# Patient Record
Sex: MALE | Race: WHITE | NOT HISPANIC OR LATINO | Employment: FULL TIME | URBAN - METROPOLITAN AREA
[De-identification: names, ages, dates, MRNs, and addresses within clinical notes are randomized per-mention and may not be internally consistent; named-entity substitution may affect disease eponyms.]

---

## 2023-01-23 PROBLEM — E03.8 SUBCLINICAL HYPOTHYROIDISM: Status: ACTIVE | Noted: 2023-01-23

## 2023-01-23 PROBLEM — Z86.0100 HISTORY OF COLON POLYPS: Status: ACTIVE | Noted: 2023-01-23

## 2023-01-23 PROBLEM — R10.9 ABDOMINAL PAIN: Status: ACTIVE | Noted: 2023-01-23

## 2023-01-23 PROBLEM — K80.20 CHOLELITHIASIS: Status: ACTIVE | Noted: 2023-01-23

## 2023-01-23 PROBLEM — I10 HYPERTENSION: Status: ACTIVE | Noted: 2023-01-23

## 2023-01-23 PROBLEM — R73.03 PREDIABETES: Status: ACTIVE | Noted: 2023-01-23

## 2023-01-23 PROBLEM — G47.9 SLEEP DIFFICULTIES: Status: ACTIVE | Noted: 2023-01-23

## 2023-01-23 PROBLEM — Z12.5 PROSTATE CANCER SCREENING: Status: ACTIVE | Noted: 2023-01-23

## 2023-01-23 PROBLEM — Z86.010 HISTORY OF COLON POLYPS: Status: ACTIVE | Noted: 2023-01-23

## 2023-01-23 PROBLEM — R05.9 COUGH: Status: ACTIVE | Noted: 2023-01-23

## 2023-01-23 PROBLEM — N28.9 RENAL INSUFFICIENCY: Status: ACTIVE | Noted: 2023-01-23

## 2023-01-23 PROBLEM — E27.1 ADDISON'S DISEASE (MULTI): Status: ACTIVE | Noted: 2023-01-23

## 2023-01-23 PROBLEM — G89.18 POST-OP PAIN: Status: ACTIVE | Noted: 2023-01-23

## 2023-01-23 PROBLEM — E78.5 HYPERLIPIDEMIA: Status: ACTIVE | Noted: 2023-01-23

## 2023-01-23 PROBLEM — Z86.018 HISTORY OF CHANGING SKIN MOLE: Status: ACTIVE | Noted: 2023-01-23

## 2023-01-23 PROBLEM — R74.8 ELEVATED LIVER ENZYMES: Status: ACTIVE | Noted: 2023-01-23

## 2023-01-23 PROBLEM — Z01.818 PREOP EXAMINATION: Status: ACTIVE | Noted: 2023-01-23

## 2023-01-23 PROBLEM — R19.4 BOWEL HABIT CHANGES: Status: ACTIVE | Noted: 2023-01-23

## 2023-01-23 PROBLEM — Z13.6 SCREENING FOR ABDOMINAL AORTIC ANEURYSM (AAA) PERFORMED: Status: ACTIVE | Noted: 2023-01-23

## 2023-01-23 RX ORDER — PREDNISONE 5 MG/1
5 TABLET ORAL DAILY
COMMUNITY
End: 2024-02-09 | Stop reason: SDUPTHER

## 2023-01-23 RX ORDER — PANTOPRAZOLE SODIUM 40 MG/1
40 TABLET, DELAYED RELEASE ORAL
COMMUNITY
End: 2023-04-04 | Stop reason: ALTCHOICE

## 2023-01-23 RX ORDER — SUCRALFATE 1 G/1
1 TABLET ORAL
COMMUNITY
End: 2023-04-04 | Stop reason: ALTCHOICE

## 2023-01-23 RX ORDER — LEVOTHYROXINE SODIUM 50 UG/1
50 TABLET ORAL
COMMUNITY
End: 2023-04-04 | Stop reason: SDUPTHER

## 2023-01-23 RX ORDER — ATORVASTATIN CALCIUM 40 MG/1
40 TABLET, FILM COATED ORAL DAILY
COMMUNITY
End: 2023-08-29

## 2023-01-23 RX ORDER — PREDNISONE 2.5 MG/1
2.5 TABLET ORAL DAILY
COMMUNITY
End: 2023-04-04 | Stop reason: ALTCHOICE

## 2023-01-23 RX ORDER — FAMOTIDINE 20 MG/1
20 TABLET, FILM COATED ORAL NIGHTLY
COMMUNITY
End: 2023-04-04 | Stop reason: ALTCHOICE

## 2023-04-04 ENCOUNTER — OFFICE VISIT (OUTPATIENT)
Dept: PRIMARY CARE | Facility: CLINIC | Age: 68
End: 2023-04-04
Payer: MEDICARE

## 2023-04-04 VITALS
SYSTOLIC BLOOD PRESSURE: 125 MMHG | HEIGHT: 67 IN | WEIGHT: 218 LBS | BODY MASS INDEX: 34.21 KG/M2 | HEART RATE: 67 BPM | DIASTOLIC BLOOD PRESSURE: 69 MMHG | TEMPERATURE: 96 F | RESPIRATION RATE: 18 BRPM | OXYGEN SATURATION: 96 %

## 2023-04-04 DIAGNOSIS — E03.9 HYPOTHYROIDISM, UNSPECIFIED TYPE: ICD-10-CM

## 2023-04-04 DIAGNOSIS — R73.03 PREDIABETES: ICD-10-CM

## 2023-04-04 DIAGNOSIS — E27.1 ADDISON'S DISEASE (MULTI): ICD-10-CM

## 2023-04-04 DIAGNOSIS — Z00.00 HEALTHCARE MAINTENANCE: Primary | ICD-10-CM

## 2023-04-04 DIAGNOSIS — E03.8 SUBCLINICAL HYPOTHYROIDISM: ICD-10-CM

## 2023-04-04 DIAGNOSIS — E66.9 OBESITY (BMI 30.0-34.9): ICD-10-CM

## 2023-04-04 DIAGNOSIS — R19.4 BOWEL HABIT CHANGES: ICD-10-CM

## 2023-04-04 DIAGNOSIS — Z86.018 HISTORY OF CHANGING SKIN MOLE: ICD-10-CM

## 2023-04-04 DIAGNOSIS — Z12.5 PROSTATE CANCER SCREENING: ICD-10-CM

## 2023-04-04 DIAGNOSIS — E78.5 HYPERLIPIDEMIA, UNSPECIFIED HYPERLIPIDEMIA TYPE: ICD-10-CM

## 2023-04-04 DIAGNOSIS — Z13.6 SCREENING FOR ABDOMINAL AORTIC ANEURYSM (AAA) PERFORMED: ICD-10-CM

## 2023-04-04 DIAGNOSIS — R74.8 ELEVATED LIVER ENZYMES: ICD-10-CM

## 2023-04-04 PROBLEM — I10 HYPERTENSION: Status: RESOLVED | Noted: 2023-01-23 | Resolved: 2023-04-04

## 2023-04-04 PROBLEM — M65.841 OTHER SYNOVITIS AND TENOSYNOVITIS, RIGHT HAND: Status: ACTIVE | Noted: 2023-04-04

## 2023-04-04 PROBLEM — I10 BENIGN ESSENTIAL HYPERTENSION: Status: RESOLVED | Noted: 2023-04-04 | Resolved: 2023-04-04

## 2023-04-04 PROBLEM — I10 BENIGN ESSENTIAL HYPERTENSION: Status: ACTIVE | Noted: 2023-04-04

## 2023-04-04 PROBLEM — E66.811 OBESITY (BMI 30.0-34.9): Status: ACTIVE | Noted: 2023-04-04

## 2023-04-04 LAB — POC HEMOGLOBIN A1C: 5.7 % (ref 4.2–6.5)

## 2023-04-04 PROCEDURE — G0009 ADMIN PNEUMOCOCCAL VACCINE: HCPCS | Performed by: FAMILY MEDICINE

## 2023-04-04 PROCEDURE — 83036 HEMOGLOBIN GLYCOSYLATED A1C: CPT | Performed by: FAMILY MEDICINE

## 2023-04-04 PROCEDURE — 1160F RVW MEDS BY RX/DR IN RCRD: CPT | Performed by: FAMILY MEDICINE

## 2023-04-04 PROCEDURE — 1159F MED LIST DOCD IN RCRD: CPT | Performed by: FAMILY MEDICINE

## 2023-04-04 PROCEDURE — 90472 IMMUNIZATION ADMIN EACH ADD: CPT | Performed by: FAMILY MEDICINE

## 2023-04-04 PROCEDURE — 1036F TOBACCO NON-USER: CPT | Performed by: FAMILY MEDICINE

## 2023-04-04 PROCEDURE — 90677 PCV20 VACCINE IM: CPT | Performed by: FAMILY MEDICINE

## 2023-04-04 PROCEDURE — 3008F BODY MASS INDEX DOCD: CPT | Performed by: FAMILY MEDICINE

## 2023-04-04 PROCEDURE — 90715 TDAP VACCINE 7 YRS/> IM: CPT | Performed by: FAMILY MEDICINE

## 2023-04-04 PROCEDURE — 99214 OFFICE O/P EST MOD 30 MIN: CPT | Performed by: FAMILY MEDICINE

## 2023-04-04 RX ORDER — LEVOTHYROXINE SODIUM 50 UG/1
50 TABLET ORAL
Qty: 90 TABLET | Refills: 3 | Status: SHIPPED | OUTPATIENT
Start: 2023-04-04 | End: 2024-04-09

## 2023-04-04 RX ORDER — DOXYCYCLINE 50 MG/1
1 TABLET ORAL DAILY
COMMUNITY
Start: 2023-03-09 | End: 2024-05-09 | Stop reason: ALTCHOICE

## 2023-04-04 ASSESSMENT — ENCOUNTER SYMPTOMS
EYE DISCHARGE: 0
HEADACHES: 0
TREMORS: 0
SPEECH DIFFICULTY: 0
POLYDIPSIA: 0
BACK PAIN: 0
ABDOMINAL PAIN: 0
NERVOUS/ANXIOUS: 0
FATIGUE: 0
ARTHRALGIAS: 1
DYSURIA: 0
AGITATION: 0
MYALGIAS: 0
LIGHT-HEADEDNESS: 0
ANAL BLEEDING: 0
SORE THROAT: 0
NECK PAIN: 0
ADENOPATHY: 0
UNEXPECTED WEIGHT CHANGE: 0
POLYPHAGIA: 0
BRUISES/BLEEDS EASILY: 0
HEMATURIA: 0
TROUBLE SWALLOWING: 0
NECK STIFFNESS: 0
CONSTIPATION: 1
NUMBNESS: 0
DIZZINESS: 0
WHEEZING: 0
FLANK PAIN: 0
DIARRHEA: 0
SEIZURES: 0
CHEST TIGHTNESS: 0
FEVER: 0
VOMITING: 0
RECTAL PAIN: 0
BLOOD IN STOOL: 0
DIFFICULTY URINATING: 0

## 2023-04-04 ASSESSMENT — PATIENT HEALTH QUESTIONNAIRE - PHQ9
SUM OF ALL RESPONSES TO PHQ9 QUESTIONS 1 AND 2: 0
1. LITTLE INTEREST OR PLEASURE IN DOING THINGS: NOT AT ALL
2. FEELING DOWN, DEPRESSED OR HOPELESS: NOT AT ALL

## 2023-04-04 NOTE — PATIENT INSTRUCTIONS
Please consider exercise program involving walking or some other form of aerobic activity 5 days weekly for 30 minutes... Let's also consider strengthening of large muscle groups like the abdominal muscles or shoulder muscles... Twice weekly with reps of 5/10/15 exercises and gradually increase strength.. This is not heavy strength training but light weight training... Sit ups or back exercise routine.. Please ask for handout if uncertain how to do..This  will help to strengthen your muscles which in turn will help you to lose weight.... You might ask what is the best diet available.. I would strongly encourage you to consider  Weight Watchers.. And as  your  fellow on  Weight Watchers physician attempting to  live this  LIFE  style  choice with you....  I will be glad to give you recommendations on what to eat.. Consider buying Macy bread.., lisa bagle thin bread.. oikos yogurt... eggs  to eat as hard-boiled... Halo top ice cream for snack... All these are delicious foods which.. when eaten and  being compliant eating three  meals daily  breakfast lunch and dinner, drinking  64 ounces of water daily we will all win together !!!!!!!. This will be a means for you to lose weight... Consider also the smart phone andrews ... My plate.. Or My  fitness  pal..,  as additional possibilities for weight loss... Good  lucmariola Ybarra!    Discussed medication side effects.  The  risk benefits and treatment options  discussed with patient.  Better or so we added his name at    Please schedule follow-up appointment based upon your improvement/failure to improve/chronic medical conditions and physician recommendations during office appointment at the .  For lesion, open ended    Patient advised to go to er if symptoms worsen or to call answering service, or to return to office for additional evaluation    This note was partially  generated using Dragon voice recognition and there may be incorrect words, wording,  spelling, or pronunciation errors that were not corrected prior to committing the note to the medical record.     Problem List Items Addressed This Visit          Digestive    [place]'s disease (CMS/[facility])     Will continue to monitor electrolytes patient advised to get labs Joash/July in light of recent labs being 12/22.  Encouraged to see endocrinology for close follow-up and recommendations            Endocrine/Metabolic    Prediabetes    Relevant Orders    Prostate Specific Antigen, Screen    POCT glycosylated hemoglobin (Hb A1C) manually resulted    Subclinical hypothyroidism     Last TSH noted andDated 11/16/2021 with result being 1.46 and stable free T41.0 821 patient encouraged to take same dosage interval repeat testing with labs this year.         Obesity (BMI 30.0-34.9)     Please consider exercise program involving walking or some other form of aerobic activity 5 days weekly for 30 minutes... Let's also consider strengthening of large muscle groups like the abdominal muscles or shoulder muscles... Twice weekly with reps of 5/10/15 exercises and gradually increase strength.. This is not heavy strength training but light weight training... Sit ups or back exercise routine.. Please ask for handout if uncertain how to do..This  will help to strengthen your muscles which in turn will help you to lose weight.... You might ask what is the best diet available.. I would strongly encourage you to consider  Weight Watchers.. And as  your  fellow on  Weight Watchers physician attempting to  live this  LIFE  style  choice with you....  I will be glad to give you recommendations on what to eat.. Consider buying Macy bread.., lisa bagle thin bread.. oikos yogurt... eggs  to eat as hard-boiled... Halo top ice cream for snack... All these are delicious foods which.. when eaten and  being compliant eating three  meals daily  breakfast lunch and dinner, drinking  64 ounces of water daily we will all win together  !!!!!!!. This will be a means for you to lose weight... Consider also the smart phone andrews ... My plate.. Or My  fitness  pal..,  as additional possibilities for weight loss... Good  luck  Dr. ARETHA Ybarra!                        Other    Bowel habit changes    Relevant Orders    CBC    Elevated liver enzymes    History of changing skin mole    Hyperlipidemia     Cholesterol numbers reviewed Lipids dated 6/7/2021 showed cholesterol 148 HDL 39 LDL 89 with other labs and encourage diet  repeat ordered           Relevant Orders    Comprehensive Metabolic Panel    Lipid Panel    Prostate cancer screening    Relevant Orders    Prostate Specific Antigen, Screen    Screening for abdominal aortic aneurysm (AAA) performed     Ultrasound ordered and plan above and no aneurysm noted         Healthcare maintenance - Primary     Other Visit Diagnoses       Hypothyroidism, unspecified type        Relevant Medications    levothyroxine (Synthroid, Levoxyl) 50 mcg tablet    Other Relevant Orders    Thyroxine, Free    Thyroid Stimulating Hormone

## 2023-04-04 NOTE — ASSESSMENT & PLAN NOTE
Please consider exercise program involving walking or some other form of aerobic activity 5 days weekly for 30 minutes... Let's also consider strengthening of large muscle groups like the abdominal muscles or shoulder muscles... Twice weekly with reps of 5/10/15 exercises and gradually increase strength.. This is not heavy strength training but light weight training... Sit ups or back exercise routine.. Please ask for handout if uncertain how to do..This  will help to strengthen your muscles which in turn will help you to lose weight.... You might ask what is the best diet available.. I would strongly encourage you to consider  Weight Watchers.. And as  your  fellow on  Weight Watchers physician attempting to  live this  LIFE  style  choice with you....  I will be glad to give you recommendations on what to eat.. Consider buying Macy bread.., lisa bagle thin bread.. oikos yogurt... eggs  to eat as hard-boiled... Halo top ice cream for snack... All these are delicious foods which.. when eaten and  being compliant eating three  meals daily  breakfast lunch and dinner, drinking  64 ounces of water daily we will all win together !!!!!!!. This will be a means for you to lose weight... Consider also the smart phone andrews ... My plate.. Or My  fitness  pal..,  as additional possibilities for weight loss... Good  luck  Dr. ARETHA Ybarra!

## 2023-04-04 NOTE — ASSESSMENT & PLAN NOTE
Cholesterol numbers reviewed Lipids dated 6/7/2021 showed cholesterol 148 HDL 39 LDL 89 with other labs and encourage diet  repeat ordered

## 2023-04-04 NOTE — ASSESSMENT & PLAN NOTE
Last TSH noted andDated 11/16/2021 with result being 1.46 and stable free T41.0 821 patient encouraged to take same dosage interval repeat testing with labs this year.

## 2023-04-04 NOTE — ASSESSMENT & PLAN NOTE
Will continue to monitor electrolytes patient advised to get labs Joash/July in light of recent labs being 12/22.  Encouraged to see endocrinology for close follow-up and recommendations

## 2023-04-04 NOTE — PROGRESS NOTES
Subjective   Patient ID: Adrian Rosa is a 67 y.o. male who presents for Annual Exam.    HERE FOR RECHK OF THYROIDLast clinic visit was month (S) ago. Symptoms do not include weight gain,  , fatigue, weakness, appetite, hair loss, dry skin    There is no known event that preceded symptom onset.  . Current treatment includes levothyroxine. Report there is good compliance with medical treatment. ADMITS  TO     COLD INTOLERANCE  Patient is also here for follow-up of hyperlipidemia. The most recent measurements for this patient would take it on.. 6/7/22At that time.. Total cholesterol..148    LDL  89   HDL 39  TRIGLYCERIDES   98    .... Associated symptoms do not include chest pain, Cramps with exertion, myalgias or nausea. Current treatment includes statins. By report there is good compliance with treatment. Pertinent medical history includes   PREDIABETES             Review of Systems   Constitutional:  Negative for fatigue, fever and unexpected weight change.   HENT:  Negative for congestion, dental problem, ear pain, mouth sores, nosebleeds, sore throat and trouble swallowing.    Eyes:  Negative for discharge.   Respiratory:  Negative for chest tightness and wheezing.    Cardiovascular:  Negative for chest pain.   Gastrointestinal:  Positive for constipation. Negative for abdominal pain, anal bleeding, blood in stool, diarrhea, rectal pain and vomiting.   Endocrine: Negative for cold intolerance, heat intolerance, polydipsia, polyphagia and polyuria.   Genitourinary:  Negative for difficulty urinating, dysuria, flank pain, hematuria and penile discharge.   Musculoskeletal:  Positive for arthralgias. Negative for back pain, myalgias, neck pain and neck stiffness.        Complains of right knee  pain   Skin:  Negative for rash.   Neurological:  Negative for dizziness, tremors, seizures, syncope, speech difficulty, light-headedness, numbness and headaches.   Hematological:  Negative for adenopathy. Does not  "bruise/bleed easily.   Psychiatric/Behavioral:  Negative for agitation. The patient is not nervous/anxious.        Objective   /69   Pulse 67   Temp 35.6 °C (96 °F)   Resp 18   Ht 1.702 m (5' 7\")   Wt 98.9 kg (218 lb)   SpO2 96%   BMI 34.14 kg/m²     Physical Exam  Vitals reviewed.   Constitutional:       Appearance: Normal appearance.   HENT:      Head: Normocephalic.      Right Ear: External ear normal.      Left Ear: External ear normal.      Nose: Nose normal.      Mouth/Throat:      Mouth: Mucous membranes are moist.   Eyes:      Conjunctiva/sclera: Conjunctivae normal.   Cardiovascular:      Rate and Rhythm: Regular rhythm.      Heart sounds: Normal heart sounds.   Pulmonary:      Effort: Pulmonary effort is normal.      Breath sounds: Normal breath sounds.   Abdominal:      General: Bowel sounds are normal.      Palpations: Abdomen is soft.   Musculoskeletal:         General: Normal range of motion.      Cervical back: Neck supple.   Skin:     General: Skin is warm and dry.   Neurological:      General: No focal deficit present.      Mental Status: He is alert and oriented to person, place, and time.   Psychiatric:         Behavior: Behavior normal.         Judgment: Judgment normal.       Bilateral flat lipids dated 6/7/2021 shows cholesterol 148 HDL 39 LDL 89 on standing with triglycerides of 98 and CMP dated 12/22 showed sodium 134 potassium 3.7 electrolytes within normal limits liver test within normal limits H. pylori of the stool was negative CBC with differential showed WBC 8.5 hemoglobin 13.5 and platelets 431 all done on 12/22 with last PSA being 6/7/2021 at 0.60    Chest x-ray dated 12/22 showed no acute cardiac pulmonary process review of gallbladder sonogram showed a 1.2 cm calculus at the neck of the gallbladder sludge dated 4/11/2022    Results of abdominal aortic ultrasound dated 6/10/2021 showed no evidence for abdominal aortic aneurysm      Results of colonoscopy dated 3/17/2021 " showed entire examined ileum.  Normal there was a 5 mm polyp in the ascending colon nonbleeding internal hemorrhoids were noted repeat colonoscopy in 5 years.    Assessment/Plan   Problem List Items Addressed This Visit          Digestive    [place]'s disease (CMS/HCC)     Will continue to monitor electrolytes patient advised to get labs Joash/July in light of recent labs being 12/22.  Encouraged to see endocrinology for close follow-up and recommendations            Endocrine/Metabolic    Prediabetes    Relevant Orders    Prostate Specific Antigen, Screen    POCT glycosylated hemoglobin (Hb A1C) manually resulted    Subclinical hypothyroidism     Last TSH noted andDated 11/16/2021 with result being 1.46 and stable free T41.0 821 patient encouraged to take same dosage interval repeat testing with labs this year.         Obesity (BMI 30.0-34.9)     Please consider exercise program involving walking or some other form of aerobic activity 5 days weekly for 30 minutes... Let's also consider strengthening of large muscle groups like the abdominal muscles or shoulder muscles... Twice weekly with reps of 5/10/15 exercises and gradually increase strength.. This is not heavy strength training but light weight training... Sit ups or back exercise routine.. Please ask for handout if uncertain how to do..This  will help to strengthen your muscles which in turn will help you to lose weight.... You might ask what is the best diet available.. I would strongly encourage you to consider  Weight Watchers.. And as  your  fellow on  Weight Watchers physician attempting to  live this  LIFE  style  choice with you....  I will be glad to give you recommendations on what to eat.. Consider buying Macy bread.., lisa bagle thin bread.. oikos yogurt... eggs  to eat as hard-boiled... Halo top ice cream for snack... All these are delicious foods which.. when eaten and  being compliant eating three  meals daily  breakfast lunch and dinner,  drinking  64 ounces of water daily we will all win together !!!!!!!. This will be a means for you to lose weight... Consider also the smart phone andrews ... My plate.. Or My  fitness  pal..,  as additional possibilities for weight loss... Good  luck  Dr. ARETHA Ybarra!                        Other    Bowel habit changes    Relevant Orders    CBC    Elevated liver enzymes    History of changing skin mole    Hyperlipidemia     Cholesterol numbers reviewed Lipids dated 6/7/2021 showed cholesterol 148 HDL 39 LDL 89 with other labs and encourage diet  repeat ordered           Relevant Orders    Comprehensive Metabolic Panel    Lipid Panel    Prostate cancer screening    Relevant Orders    Prostate Specific Antigen, Screen    Screening for abdominal aortic aneurysm (AAA) performed     Ultrasound ordered and plan above and no aneurysm noted         Healthcare maintenance - Primary     Other Visit Diagnoses       Hypothyroidism, unspecified type        Relevant Medications    levothyroxine (Synthroid, Levoxyl) 50 mcg tablet    Other Relevant Orders    Thyroxine, Free    Thyroid Stimulating Hormone

## 2023-06-07 ENCOUNTER — LAB (OUTPATIENT)
Dept: LAB | Facility: LAB | Age: 68
End: 2023-06-07
Payer: MEDICARE

## 2023-06-07 DIAGNOSIS — R19.4 BOWEL HABIT CHANGES: ICD-10-CM

## 2023-06-07 DIAGNOSIS — E78.5 HYPERLIPIDEMIA, UNSPECIFIED HYPERLIPIDEMIA TYPE: ICD-10-CM

## 2023-06-07 DIAGNOSIS — E03.9 HYPOTHYROIDISM, UNSPECIFIED TYPE: ICD-10-CM

## 2023-06-07 DIAGNOSIS — R73.03 PREDIABETES: ICD-10-CM

## 2023-06-07 DIAGNOSIS — Z12.5 PROSTATE CANCER SCREENING: ICD-10-CM

## 2023-06-07 LAB
ALANINE AMINOTRANSFERASE (SGPT) (U/L) IN SER/PLAS: 23 U/L (ref 10–52)
ALBUMIN (G/DL) IN SER/PLAS: 3.8 G/DL (ref 3.4–5)
ALKALINE PHOSPHATASE (U/L) IN SER/PLAS: 57 U/L (ref 33–136)
ANION GAP IN SER/PLAS: 10 MMOL/L (ref 10–20)
ASPARTATE AMINOTRANSFERASE (SGOT) (U/L) IN SER/PLAS: 30 U/L (ref 9–39)
BILIRUBIN TOTAL (MG/DL) IN SER/PLAS: 0.9 MG/DL (ref 0–1.2)
CALCIUM (MG/DL) IN SER/PLAS: 8.9 MG/DL (ref 8.6–10.3)
CARBON DIOXIDE, TOTAL (MMOL/L) IN SER/PLAS: 24 MMOL/L (ref 21–32)
CHLORIDE (MMOL/L) IN SER/PLAS: 104 MMOL/L (ref 98–107)
CHOLESTEROL (MG/DL) IN SER/PLAS: 123 MG/DL (ref 0–199)
CHOLESTEROL IN HDL (MG/DL) IN SER/PLAS: 36 MG/DL
CHOLESTEROL/HDL RATIO: 3.4
CREATININE (MG/DL) IN SER/PLAS: 1.1 MG/DL (ref 0.5–1.3)
ERYTHROCYTE DISTRIBUTION WIDTH (RATIO) BY AUTOMATED COUNT: 12.8 % (ref 11.5–14.5)
ERYTHROCYTE MEAN CORPUSCULAR HEMOGLOBIN CONCENTRATION (G/DL) BY AUTOMATED: 33.7 G/DL (ref 32–36)
ERYTHROCYTE MEAN CORPUSCULAR VOLUME (FL) BY AUTOMATED COUNT: 91 FL (ref 80–100)
ERYTHROCYTES (10*6/UL) IN BLOOD BY AUTOMATED COUNT: 4.64 X10E12/L (ref 4.5–5.9)
GFR MALE: 73 ML/MIN/1.73M2
GLUCOSE (MG/DL) IN SER/PLAS: 73 MG/DL (ref 74–99)
HEMATOCRIT (%) IN BLOOD BY AUTOMATED COUNT: 42.1 % (ref 41–52)
HEMOGLOBIN (G/DL) IN BLOOD: 14.2 G/DL (ref 13.5–17.5)
LDL: 71 MG/DL (ref 0–99)
LEUKOCYTES (10*3/UL) IN BLOOD BY AUTOMATED COUNT: 7.6 X10E9/L (ref 4.4–11.3)
PLATELETS (10*3/UL) IN BLOOD AUTOMATED COUNT: 296 X10E9/L (ref 150–450)
POTASSIUM (MMOL/L) IN SER/PLAS: 3.7 MMOL/L (ref 3.5–5.3)
PROSTATE SPECIFIC ANTIGEN,SCREEN: 0.61 NG/ML (ref 0–4)
PROTEIN TOTAL: 7.6 G/DL (ref 6.4–8.2)
SODIUM (MMOL/L) IN SER/PLAS: 134 MMOL/L (ref 136–145)
THYROTROPIN (MIU/L) IN SER/PLAS BY DETECTION LIMIT <= 0.05 MIU/L: 1.84 MIU/L (ref 0.44–3.98)
THYROXINE (T4) FREE (NG/DL) IN SER/PLAS: 1.13 NG/DL (ref 0.61–1.12)
TRIGLYCERIDE (MG/DL) IN SER/PLAS: 82 MG/DL (ref 0–149)
UREA NITROGEN (MG/DL) IN SER/PLAS: 21 MG/DL (ref 6–23)
VLDL: 16 MG/DL (ref 0–40)

## 2023-06-07 PROCEDURE — 80061 LIPID PANEL: CPT

## 2023-06-07 PROCEDURE — 84443 ASSAY THYROID STIM HORMONE: CPT

## 2023-06-07 PROCEDURE — 80053 COMPREHEN METABOLIC PANEL: CPT

## 2023-06-07 PROCEDURE — 84439 ASSAY OF FREE THYROXINE: CPT

## 2023-06-07 PROCEDURE — 36415 COLL VENOUS BLD VENIPUNCTURE: CPT

## 2023-06-07 PROCEDURE — 85027 COMPLETE CBC AUTOMATED: CPT

## 2023-06-07 PROCEDURE — G0103 PSA SCREENING: HCPCS

## 2023-08-26 DIAGNOSIS — R74.8 ABNORMAL LEVELS OF OTHER SERUM ENZYMES: ICD-10-CM

## 2023-08-29 RX ORDER — ATORVASTATIN CALCIUM 40 MG/1
40 TABLET, FILM COATED ORAL DAILY
Qty: 90 TABLET | Refills: 1 | Status: SHIPPED | OUTPATIENT
Start: 2023-08-29 | End: 2024-03-07

## 2023-09-05 ENCOUNTER — DOCUMENTATION (OUTPATIENT)
Dept: PRIMARY CARE | Facility: CLINIC | Age: 68
End: 2023-09-05
Payer: MEDICARE

## 2023-09-05 ENCOUNTER — PATIENT OUTREACH (OUTPATIENT)
Dept: CARE COORDINATION | Facility: CLINIC | Age: 68
End: 2023-09-05
Payer: MEDICARE

## 2023-09-05 NOTE — PROGRESS NOTES
Discharge Facility: [facility]  Discharge Diagnosis: Hydronephrosis, right; Ureteral calculus, right  Admission Date: 9/3/2023  Discharge Date: 9/4/2023    PCP Appointment Date:  - Next appt: 10/4/2023 1020; Pt declines sooner appt at this time    Specialist Appointment Date:   -9/7/2023 Urologist- stent removal    Hospital Encounter and Summary: Linked     See discharge assessment below for further details    Engagement  Call Start Time: 1006 (9/5/2023 10:08 AM)    Medications  Medications reviewed with patient/caregiver?: Not applicable (9/5/2023 10:08 AM)  Does the patient have all medications ordered at discharge?: Not applicable (9/5/2023 10:08 AM)  Care Management Interventions: No intervention needed (9/5/2023 10:08 AM)  Is the patient taking all medications as directed (includes completed medication regime)?: Yes (9/5/2023 10:08 AM)    Appointments  Does the patient have a primary care provider?: Yes (9/5/2023 10:08 AM)  Has the patient kept scheduled appointments due by today?: Yes (9/5/2023 10:08 AM)    Self Management  Has home health visited the patient within 72 hours of discharge?: Not applicable (9/5/2023 10:08 AM)    Patient Teaching  Does the patient have access to their discharge instructions?: Yes (9/5/2023 10:08 AM)  Care Management Interventions: Reviewed instructions with patient (9/5/2023 10:08 AM)  What is the patient's perception of their health status since discharge?: Improving (9/5/2023 10:08 AM)  Is the patient/caregiver able to teach back the hierarchy of who to call/visit for symptoms/problems? PCP, Specialist, Home Health nurse, Urgent Care, ED, 911: Yes (9/5/2023 10:08 AM)    Wrap Up  Wrap Up Additional Comments: Pt admtited to [facility] 9/3-9/4 for right ureteral calculus and right hydronephrosis. Prior to hospitalization pt was having abdominal pain, nausea and decrease appetite. Pt states his symptoms have improved. Pt had right ureteroscopy with laser lithotripsy and double-J  stent placement with right ureeteroscopy with stone removal 9/3. Pt denies any questions, needs, or concerns at this time. Pt has appt Thursday for stent removal. Verified pt next PCP appt 10/4 1020. Pt declines sooner PCP follow up at this time. Pt encouraged to call if questions or needs arise. (9/5/2023 10:08 AM)  Call End Time: 1012 (9/5/2023 10:08 AM)

## 2023-09-19 ENCOUNTER — PATIENT OUTREACH (OUTPATIENT)
Dept: CARE COORDINATION | Facility: CLINIC | Age: 68
End: 2023-09-19
Payer: MEDICARE

## 2023-09-19 NOTE — PROGRESS NOTES
14 day call back complete.  At time of outreach call the patient feels as if their condition has improved since last visit.    Verified next PCP appt 10/4/2023 1020.  Pt states he has a urology follow up he believes on 9/26.    Pt denies any questions, needs, or concerns at this time. Pt encouraged to call if questions arise.

## 2023-10-04 ENCOUNTER — OFFICE VISIT (OUTPATIENT)
Dept: PRIMARY CARE | Facility: CLINIC | Age: 68
End: 2023-10-04
Payer: MEDICARE

## 2023-10-04 VITALS
HEIGHT: 67 IN | RESPIRATION RATE: 18 BRPM | DIASTOLIC BLOOD PRESSURE: 70 MMHG | TEMPERATURE: 97.6 F | SYSTOLIC BLOOD PRESSURE: 128 MMHG | BODY MASS INDEX: 34.06 KG/M2 | OXYGEN SATURATION: 96 % | WEIGHT: 217 LBS | HEART RATE: 58 BPM

## 2023-10-04 DIAGNOSIS — E78.5 HYPERLIPIDEMIA, UNSPECIFIED HYPERLIPIDEMIA TYPE: Primary | ICD-10-CM

## 2023-10-04 DIAGNOSIS — N28.9 RENAL INSUFFICIENCY: ICD-10-CM

## 2023-10-04 DIAGNOSIS — E27.1 ADDISON'S DISEASE (MULTI): ICD-10-CM

## 2023-10-04 DIAGNOSIS — E03.8 SUBCLINICAL HYPOTHYROIDISM: ICD-10-CM

## 2023-10-04 PROCEDURE — 1036F TOBACCO NON-USER: CPT | Performed by: FAMILY MEDICINE

## 2023-10-04 PROCEDURE — 99214 OFFICE O/P EST MOD 30 MIN: CPT | Performed by: FAMILY MEDICINE

## 2023-10-04 PROCEDURE — 1159F MED LIST DOCD IN RCRD: CPT | Performed by: FAMILY MEDICINE

## 2023-10-04 PROCEDURE — 1126F AMNT PAIN NOTED NONE PRSNT: CPT | Performed by: FAMILY MEDICINE

## 2023-10-04 PROCEDURE — 1160F RVW MEDS BY RX/DR IN RCRD: CPT | Performed by: FAMILY MEDICINE

## 2023-10-04 RX ORDER — TAMSULOSIN HYDROCHLORIDE 0.4 MG/1
0.4 CAPSULE ORAL
COMMUNITY
End: 2024-05-09 | Stop reason: ALTCHOICE

## 2023-10-04 RX ORDER — BNT162B2 0.23 MG/2.25ML
INJECTION, SUSPENSION INTRAMUSCULAR
COMMUNITY
End: 2024-05-09 | Stop reason: ALTCHOICE

## 2023-10-04 NOTE — PATIENT INSTRUCTIONS

## 2023-10-04 NOTE — ASSESSMENT & PLAN NOTE
Patient is also here for follow-up of hyperlipidemia. The most recent measurements for this patient would take it on..  6/7/2023 showed cholesterol 123   hdl   36.. ldl    71      Associated symptoms do not include chest pain, Cramps with exertion, myalgias or nausea. Current treatment includes statins. By report there is good compliance with treatment. Pertinent medical history includes hypothyroidism   and  addisons  disease   stable and continue meds

## 2023-10-04 NOTE — ASSESSMENT & PLAN NOTE
HERE FOR RECHK OF THYROIDLast clinic visit was month (S) ago. Symptoms do not include weight gain, cold intolerance, fatigue, weakness, appetite, hair loss, dry skin    There is no known event that preceded symptom onset.  . Current treatment includes levothyroxine. Report there is good compliance with medical treatment.  stable and continue meds

## 2023-10-04 NOTE — PROGRESS NOTES
Subjective   Patient ID: Adrian Rosa is a 67 y.o. male who presents for Follow-up.    HPI   Patient is also here for follow-up of hyperlipidemia. The most recent measurements for this patient would take it on..  6/7/2023 showed cholesterol 123   hdl   36.. ldl    71      Associated symptoms do not include chest pain, Cramps with exertion, myalgias or nausea. Current treatment includes statins. By report there is good compliance with treatment. Pertinent medical history includes hypothyroidism   and  addisons  disease  Addisons  and saw endocrinology...   and stable with   prednisone   HERE FOR RECHK OF THYROIDLast clinic visit was month (S) ago. Symptoms do not include weight gain, cold intolerance, fatigue, weakness, appetite, hair loss, dry skin    There is no known event that preceded symptom onset.  . Current treatment includes levothyroxine. Report there is good compliance with medical treatment.   Patient was admitted 9/3/2022 seen in the emergency department for right upper and lower quadrant abdominal pain nausea vomiting decreased appetite.  Review of records indicate CT abdomen pelvis showed a 7 mm obstructive right distal ureteral calculus with associated moderate right hydronephrosis elevated creatinine was noted along with leukocytosis he had been previously discharged home with oxycodone Zofran Flomax symptoms persisted worsened and thus returned to the emergency room.  Patient had severe decrease in appetite along with vomiting persistent nausea.  In the emergency department his blood pressure was elevated at 192/82 with WBC of 15.4 creatinine 2.2 weight with x-ray of the stomach showing IV contrast and persistent right hydroureter Dilaudid and Zofran were given and IV fluid bolus with urology consultation being performed was noted to have moderate blood in his urine with 5-20 RBCs and suspected hyponatremia in the presence of acute renal failure with subsequent hypovolemia..    Review of urology  "note t revealed same date a large stone fragment remaining in the ureter stent was placed.  Results of lab testing dated 9/12/2023 showed glucose 133 sodium 134 creatinine improved with GFR increasing from 48-53 with normal liver function studies.  Review of Systems  cardiovascular:  no  palpitations or chest  pain  respiratory: no  shortness  of  breath  endocrine:  no polydipsia,  no polyuria  musculoskeletal:  no  myalgia.. no arthralgia  All other  systems discussed  negative   Objective   /70   Pulse 58   Temp 36.4 °C (97.6 °F)   Resp 18   Ht 1.702 m (5' 7\")   Wt 98.4 kg (217 lb)   SpO2 96%   BMI 33.99 kg/m²     Physical Exam  general: alert oriented x three  HEENT hearing normal to voice  Neck supple  Lungs respirations non-labored.  Cardiovascular: no peripheral edema  Skin: warm and dry without rash  Psych: judgement and insight normal  Musculoskeletal:  ambulation normal,    lymph:negative cervical  LYMPADENOPATHY  thyroid: non palpable enlargement        Component  Ref Range & Units 3 mo ago 2 yr ago   Prostate Specific Antigen,Screen  0.00 - 4.00 ng/mL 0.61 0.60 CM   Comment: The FDA requires that the method used for PSA assay be  reported to the physician. Values obtained with different  assay methods must not be used interchangeably. This test  was performed at [facility] using the Access  [employer] PSA assay is a two-site immunoenzymatic sandwich  assay. The assay is approved for measurement of  prostate-specific antigen (PSA)in serum and may be used  in conjunction with a digital rectal examination in men  50 years and older as an aid in detection of prostate  cancer.  5-Alpha-reductase inhibitors (e.g. Proscar, Finasteride,   Thyroid Stimulating Hormone  Order: [id number]  Status: Final result       Visible to patient: Yes (not seen)       Dx: Hypothyroidism, unspecified type    0 Result Notes       1  Topic           Component  Ref Range & Units 3 mo ago  (6/7/23) 1 yr " ago  (11/16/21) 1 yr ago  (10/8/21) 2 yr ago  (8/26/21) 2 yr ago  (6/7/21) 2 yr ago  (12/11/20)   TSH  0.44 - 3.98 mIU/L 1.84 1.46 CM 1.16 CM 4.50 High  CM 4.43 High  CM 5.28 High  CM   Comment:  TSH testing is performed using different testing   methodology at [facility] than at other   [facility]. Direct result comparisons should   only be made within the same method.   [facility]              Specimen Collected: 06/07/23 14:20 Last Resulted: 06/07/23 21:22        Lab Flowsheet        Order Details        View Encounter        Lab and Collection Details        Routing        Result History     View All Conversations on this Encounter        CM=Additional comments             Assessment/Plan   Problem List Items Addressed This Visit             ICD-10-CM    [place]'s disease (CMS/[facility]) E27.1     Addisons  and saw endocrinology...   and stable with   prednisone   continue endocrine refreral/f/u            Hyperlipidemia - Primary E78.5     Patient is also here for follow-up of hyperlipidemia. The most recent measurements for this patient would take it on..  6/7/2023 showed cholesterol 123   hdl   36.. ldl    71      Associated symptoms do not include chest pain, Cramps with exertion, myalgias or nausea. Current treatment includes statins. By report there is good compliance with treatment. Pertinent medical history includes hypothyroidism   and  addisons  disease   stable and continue meds             Subclinical hypothyroidism E03.8     HERE FOR RECHK OF THYROIDLast clinic visit was month (S) ago. Symptoms do not include weight gain, cold intolerance, fatigue, weakness, appetite, hair loss, dry skin    There is no known event that preceded symptom onset.  . Current treatment includes levothyroxine. Report there is good compliance with medical treatment.  stable and continue meds          2 kidney stone with resolution and stent removal recommendation 60 ounces of water daily and avoid animal protein see results above we will repeat BMP prior to next visit

## 2023-10-04 NOTE — ASSESSMENT & PLAN NOTE
Addisons  and saw endocrinology...   and stable with   prednisone   continue endocrine refreral/f/u      Susana left  saying Angel's seizures were getting worse.     Called  who told me that if they continue to get worse, Ruben may need to be admitted into the hospital.    Called Susana back and told her this. She said she wanted  to know that Ruben had been started on levofloxacin 750mg as they suspected he had another infection. She also said she had raised his topiramate to 125mg in AM and 100mg in PM. She wanted  to be aware of this. She said she might choose to admit Ruben into [facility] today, but will keep us updated. Also informed her that  will be leaving in June. Pt understood and said she will discuss with him transfer of care at [facility]'s next appt.

## 2023-10-24 ENCOUNTER — PATIENT OUTREACH (OUTPATIENT)
Dept: CARE COORDINATION | Facility: CLINIC | Age: 68
End: 2023-10-24
Payer: MEDICARE

## 2023-11-28 ENCOUNTER — PATIENT OUTREACH (OUTPATIENT)
Dept: CARE COORDINATION | Facility: CLINIC | Age: 68
End: 2023-11-28
Payer: MEDICARE

## 2023-11-28 NOTE — PROGRESS NOTES
Unable to reach patient for 90 day post discharge follow up call.   Left voicemail with call back number for patient to call if needed   If no voicemail available call attempts x 2 were made to contact the patient to assist with any questions or concerns patient may have.

## 2023-12-27 ENCOUNTER — APPOINTMENT (OUTPATIENT)
Dept: RADIOLOGY | Facility: HOSPITAL | Age: 68
End: 2023-12-27
Payer: MEDICARE

## 2023-12-27 ENCOUNTER — APPOINTMENT (OUTPATIENT)
Dept: CARDIOLOGY | Facility: HOSPITAL | Age: 68
End: 2023-12-27
Payer: MEDICARE

## 2023-12-27 ENCOUNTER — HOSPITAL ENCOUNTER (EMERGENCY)
Facility: HOSPITAL | Age: 68
Discharge: HOME | End: 2023-12-27
Attending: STUDENT IN AN ORGANIZED HEALTH CARE EDUCATION/TRAINING PROGRAM
Payer: MEDICARE

## 2023-12-27 VITALS
HEIGHT: 68 IN | SYSTOLIC BLOOD PRESSURE: 130 MMHG | RESPIRATION RATE: 16 BRPM | HEART RATE: 60 BPM | OXYGEN SATURATION: 99 % | BODY MASS INDEX: 31.83 KG/M2 | WEIGHT: 210 LBS | DIASTOLIC BLOOD PRESSURE: 66 MMHG | TEMPERATURE: 97 F

## 2023-12-27 DIAGNOSIS — R10.84 GENERALIZED ABDOMINAL PAIN: Primary | ICD-10-CM

## 2023-12-27 LAB
ALBUMIN SERPL BCP-MCNC: 3.8 G/DL (ref 3.4–5)
ALP SERPL-CCNC: 59 U/L (ref 33–136)
ALT SERPL W P-5'-P-CCNC: 33 U/L (ref 10–52)
ANION GAP SERPL CALC-SCNC: 12 MMOL/L (ref 10–20)
APPEARANCE UR: CLEAR
AST SERPL W P-5'-P-CCNC: 47 U/L (ref 9–39)
BASOPHILS # BLD AUTO: 0.02 X10*3/UL (ref 0–0.1)
BASOPHILS NFR BLD AUTO: 0.2 %
BILIRUB SERPL-MCNC: 0.7 MG/DL (ref 0–1.2)
BILIRUB UR STRIP.AUTO-MCNC: NEGATIVE MG/DL
BUN SERPL-MCNC: 30 MG/DL (ref 6–23)
CALCIUM SERPL-MCNC: 8.5 MG/DL (ref 8.6–10.3)
CARDIAC TROPONIN I PNL SERPL HS: 10 NG/L (ref 0–20)
CARDIAC TROPONIN I PNL SERPL HS: 10 NG/L (ref 0–20)
CHLORIDE SERPL-SCNC: 99 MMOL/L (ref 98–107)
CO2 SERPL-SCNC: 23 MMOL/L (ref 21–32)
COLOR UR: YELLOW
CREAT SERPL-MCNC: 1.14 MG/DL (ref 0.5–1.3)
EOSINOPHIL # BLD AUTO: 0.34 X10*3/UL (ref 0–0.7)
EOSINOPHIL NFR BLD AUTO: 3.4 %
ERYTHROCYTE [DISTWIDTH] IN BLOOD BY AUTOMATED COUNT: 12.6 % (ref 11.5–14.5)
GFR SERPL CREATININE-BSD FRML MDRD: 70 ML/MIN/1.73M*2
GLUCOSE SERPL-MCNC: 94 MG/DL (ref 74–99)
GLUCOSE UR STRIP.AUTO-MCNC: NEGATIVE MG/DL
HCT VFR BLD AUTO: 42.9 % (ref 41–52)
HGB BLD-MCNC: 14.6 G/DL (ref 13.5–17.5)
IMM GRANULOCYTES # BLD AUTO: 0.02 X10*3/UL (ref 0–0.7)
IMM GRANULOCYTES NFR BLD AUTO: 0.2 % (ref 0–0.9)
KETONES UR STRIP.AUTO-MCNC: NEGATIVE MG/DL
LEUKOCYTE ESTERASE UR QL STRIP.AUTO: NEGATIVE
LIPASE SERPL-CCNC: 44 U/L (ref 9–82)
LYMPHOCYTES # BLD AUTO: 3.08 X10*3/UL (ref 1.2–4.8)
LYMPHOCYTES NFR BLD AUTO: 31.2 %
MCH RBC QN AUTO: 30.5 PG (ref 26–34)
MCHC RBC AUTO-ENTMCNC: 34 G/DL (ref 32–36)
MCV RBC AUTO: 90 FL (ref 80–100)
MONOCYTES # BLD AUTO: 0.96 X10*3/UL (ref 0.1–1)
MONOCYTES NFR BLD AUTO: 9.7 %
NEUTROPHILS # BLD AUTO: 5.45 X10*3/UL (ref 1.2–7.7)
NEUTROPHILS NFR BLD AUTO: 55.3 %
NITRITE UR QL STRIP.AUTO: NEGATIVE
NRBC BLD-RTO: 0 /100 WBCS (ref 0–0)
PH UR STRIP.AUTO: 5 [PH]
PLATELET # BLD AUTO: 288 X10*3/UL (ref 150–450)
POTASSIUM SERPL-SCNC: 3.3 MMOL/L (ref 3.5–5.3)
PROT SERPL-MCNC: 8 G/DL (ref 6.4–8.2)
PROT UR STRIP.AUTO-MCNC: NEGATIVE MG/DL
RBC # BLD AUTO: 4.78 X10*6/UL (ref 4.5–5.9)
RBC # UR STRIP.AUTO: NEGATIVE /UL
SODIUM SERPL-SCNC: 131 MMOL/L (ref 136–145)
SP GR UR STRIP.AUTO: 1.02
UROBILINOGEN UR STRIP.AUTO-MCNC: <2 MG/DL
WBC # BLD AUTO: 9.9 X10*3/UL (ref 4.4–11.3)

## 2023-12-27 PROCEDURE — 99285 EMERGENCY DEPT VISIT HI MDM: CPT | Mod: 25 | Performed by: STUDENT IN AN ORGANIZED HEALTH CARE EDUCATION/TRAINING PROGRAM

## 2023-12-27 PROCEDURE — 36415 COLL VENOUS BLD VENIPUNCTURE: CPT

## 2023-12-27 PROCEDURE — 2500000004 HC RX 250 GENERAL PHARMACY W/ HCPCS (ALT 636 FOR OP/ED)

## 2023-12-27 PROCEDURE — 99284 EMERGENCY DEPT VISIT MOD MDM: CPT | Performed by: STUDENT IN AN ORGANIZED HEALTH CARE EDUCATION/TRAINING PROGRAM

## 2023-12-27 PROCEDURE — 96374 THER/PROPH/DIAG INJ IV PUSH: CPT | Mod: 59 | Performed by: STUDENT IN AN ORGANIZED HEALTH CARE EDUCATION/TRAINING PROGRAM

## 2023-12-27 PROCEDURE — 96361 HYDRATE IV INFUSION ADD-ON: CPT | Mod: 59 | Performed by: STUDENT IN AN ORGANIZED HEALTH CARE EDUCATION/TRAINING PROGRAM

## 2023-12-27 PROCEDURE — 36415 COLL VENOUS BLD VENIPUNCTURE: CPT | Performed by: STUDENT IN AN ORGANIZED HEALTH CARE EDUCATION/TRAINING PROGRAM

## 2023-12-27 PROCEDURE — 74177 CT ABD & PELVIS W/CONTRAST: CPT | Mod: FOREIGN READ | Performed by: RADIOLOGY

## 2023-12-27 PROCEDURE — 93010 ELECTROCARDIOGRAM REPORT: CPT | Performed by: STUDENT IN AN ORGANIZED HEALTH CARE EDUCATION/TRAINING PROGRAM

## 2023-12-27 PROCEDURE — 93005 ELECTROCARDIOGRAM TRACING: CPT

## 2023-12-27 PROCEDURE — 83690 ASSAY OF LIPASE: CPT | Performed by: STUDENT IN AN ORGANIZED HEALTH CARE EDUCATION/TRAINING PROGRAM

## 2023-12-27 PROCEDURE — 2550000001 HC RX 255 CONTRASTS

## 2023-12-27 PROCEDURE — 74177 CT ABD & PELVIS W/CONTRAST: CPT

## 2023-12-27 PROCEDURE — 81003 URINALYSIS AUTO W/O SCOPE: CPT | Performed by: STUDENT IN AN ORGANIZED HEALTH CARE EDUCATION/TRAINING PROGRAM

## 2023-12-27 PROCEDURE — 84484 ASSAY OF TROPONIN QUANT: CPT | Mod: 91

## 2023-12-27 PROCEDURE — 85025 COMPLETE CBC W/AUTO DIFF WBC: CPT | Performed by: STUDENT IN AN ORGANIZED HEALTH CARE EDUCATION/TRAINING PROGRAM

## 2023-12-27 PROCEDURE — 80053 COMPREHEN METABOLIC PANEL: CPT | Performed by: STUDENT IN AN ORGANIZED HEALTH CARE EDUCATION/TRAINING PROGRAM

## 2023-12-27 PROCEDURE — 99285 EMERGENCY DEPT VISIT HI MDM: CPT | Performed by: STUDENT IN AN ORGANIZED HEALTH CARE EDUCATION/TRAINING PROGRAM

## 2023-12-27 RX ORDER — LIDOCAINE 560 MG/1
2 PATCH PERCUTANEOUS; TOPICAL; TRANSDERMAL DAILY
Status: DISCONTINUED | OUTPATIENT
Start: 2023-12-27 | End: 2023-12-27

## 2023-12-27 RX ORDER — ONDANSETRON HYDROCHLORIDE 2 MG/ML
4 INJECTION, SOLUTION INTRAVENOUS ONCE
Status: COMPLETED | OUTPATIENT
Start: 2023-12-27 | End: 2023-12-27

## 2023-12-27 RX ADMIN — ONDANSETRON 4 MG: 2 INJECTION INTRAMUSCULAR; INTRAVENOUS at 16:08

## 2023-12-27 RX ADMIN — IOHEXOL 75 ML: 350 INJECTION, SOLUTION INTRAVENOUS at 16:35

## 2023-12-27 RX ADMIN — SODIUM CHLORIDE, POTASSIUM CHLORIDE, SODIUM LACTATE AND CALCIUM CHLORIDE 1000 ML: 600; 310; 30; 20 INJECTION, SOLUTION INTRAVENOUS at 16:07

## 2023-12-27 ASSESSMENT — PAIN SCALES - GENERAL: PAINLEVEL_OUTOF10: 5 - MODERATE PAIN

## 2023-12-27 ASSESSMENT — LIFESTYLE VARIABLES
HAVE PEOPLE ANNOYED YOU BY CRITICIZING YOUR DRINKING: NO
REASON UNABLE TO ASSESS: NO
EVER FELT BAD OR GUILTY ABOUT YOUR DRINKING: NO
HAVE YOU EVER FELT YOU SHOULD CUT DOWN ON YOUR DRINKING: NO
EVER HAD A DRINK FIRST THING IN THE MORNING TO STEADY YOUR NERVES TO GET RID OF A HANGOVER: NO

## 2023-12-27 ASSESSMENT — COLUMBIA-SUICIDE SEVERITY RATING SCALE - C-SSRS
2. HAVE YOU ACTUALLY HAD ANY THOUGHTS OF KILLING YOURSELF?: NO
1. IN THE PAST MONTH, HAVE YOU WISHED YOU WERE DEAD OR WISHED YOU COULD GO TO SLEEP AND NOT WAKE UP?: NO
6. HAVE YOU EVER DONE ANYTHING, STARTED TO DO ANYTHING, OR PREPARED TO DO ANYTHING TO END YOUR LIFE?: NO

## 2023-12-27 ASSESSMENT — PAIN DESCRIPTION - PAIN TYPE: TYPE: ACUTE PAIN

## 2023-12-27 ASSESSMENT — PAIN - FUNCTIONAL ASSESSMENT: PAIN_FUNCTIONAL_ASSESSMENT: 0-10

## 2023-12-27 ASSESSMENT — PAIN DESCRIPTION - LOCATION: LOCATION: ABDOMEN

## 2023-12-27 NOTE — ED PROVIDER NOTES
HPI   Chief Complaint   Patient presents with    Abdominal Pain     Reports think he has food poisoning     Hemorrhoids       Patient is a 68-year-old male with [place]'s disease, hypothyroidism, hyperlipidemia, hemorrhoids who presents emergency department for abdominal discomfort.  5 days ago, patient started having vomiting and diarrhea.  The symptoms resolved after 3 days, but he has had persistent early satiety, loss of appetite, abdominal bloating and discomfort.  Additionally, his hemorrhoids have started to bleed more after he had the diarrhea.  He denies any fevers, chest pain, shortness breath, dysuria.      History provided by:  Patient                      Kirsten Coma Scale Score: 15                  Patient History   Past Medical History:   Diagnosis Date    Encounter for issue of repeat prescription 12/03/2020    Encounter for medication refill    Hypertension     Personal history of other endocrine, nutritional and metabolic disease 11/30/2020    History of Jama's disease    Prediabetes      Past Surgical History:   Procedure Laterality Date    OTHER SURGICAL HISTORY  08/15/2022    Cholecystectomy laparoscopic     Family History   Problem Relation Name Age of Onset    Ovarian cancer Mother      Peripheral vascular disease Mother      COPD Mother      Lung cancer Mother      Heart attack Mother      COPD Father      Heart failure Father      Heart attack Father      Heart attack Brother       Social History     Tobacco Use    Smoking status: Never    Smokeless tobacco: Never   Vaping Use    Vaping Use: Never used   Substance Use Topics    Alcohol use: Yes     Comment: occasional    Drug use: Never       Physical Exam   ED Triage Vitals [12/27/23 1247]   Temp Heart Rate Resp BP   36.1 °C (97 °F) 64 14 172/79      SpO2 Temp Source Heart Rate Source Patient Position   98 % Temporal Monitor Sitting      BP Location FiO2 (%)     Right arm --       Physical Exam  Vitals and nursing note reviewed.    Constitutional:       General: He is not in acute distress.     Appearance: He is well-developed.   HENT:      Head: Normocephalic and atraumatic.      Right Ear: External ear normal.      Left Ear: External ear normal.      Nose: Nose normal.   Eyes:      General: No scleral icterus.     Conjunctiva/sclera: Conjunctivae normal.      Pupils: Pupils are equal, round, and reactive to light.   Cardiovascular:      Rate and Rhythm: Normal rate and regular rhythm.      Heart sounds: No murmur heard.  Pulmonary:      Effort: Pulmonary effort is normal. No respiratory distress.      Breath sounds: Normal breath sounds.   Abdominal:      Palpations: Abdomen is soft.      Tenderness: There is abdominal tenderness. There is no guarding or rebound.      Comments: Diffuse abdominal tenderness and discomfort on palpation.   Musculoskeletal:         General: No swelling.      Cervical back: Neck supple. No rigidity.   Skin:     General: Skin is warm and dry.   Neurological:      General: No focal deficit present.      Mental Status: He is alert.   Psychiatric:         Mood and Affect: Mood normal.         ED Course & MDM   Diagnoses as of 12/28/23 0010   Generalized abdominal pain       Medical Decision Making  Patient is a 68-year-old male who presents emergency room for abdominal pain and rectal bleeding.  On arrival, he is afebrile he medically stable.  He has mild abdominal tenderness diffusely on palpation.  No acute distress, nontoxic in appearance.  No peritoneal signs.  He declines anything for pain at this time.  Will obtain lab work as well as CT imaging of his abdomen to area for any acute pathology.    Results for orders placed or performed during the hospital encounter of 12/27/23  -CBC with Differential:        Result                      Value             Ref Range           WBC                         9.9               4.4 - 11.3 x*       nRBC                        0.0               0.0 - 0.0 /1*       RBC                          4.78              4.50 - 5.90 *       Hemoglobin                  14.6              13.5 - 17.5 *       Hematocrit                  42.9              41.0 - 52.0 %       MCV                         90                80 - 100 fL         MCH                         30.5              26.0 - 34.0 *       MCHC                        34.0              32.0 - 36.0 *       RDW                         12.6              11.5 - 14.5 %       Platelets                   288               150 - 450 x1*       Neutrophils %               55.3              40.0 - 80.0 %       Immature Granulocytes *     0.2               0.0 - 0.9 %         Lymphocytes %               31.2              13.0 - 44.0 %       Monocytes %                 9.7               2.0 - 10.0 %        Eosinophils %               3.4               0.0 - 6.0 %         Basophils %                 0.2               0.0 - 2.0 %         Neutrophils Absolute        5.45              1.20 - 7.70 *       Immature Granulocytes *     0.02              0.00 - 0.70 *       Lymphocytes Absolute        3.08              1.20 - 4.80 *       Monocytes Absolute          0.96              0.10 - 1.00 *       Eosinophils Absolute        0.34              0.00 - 0.70 *       Basophils Absolute          0.02              0.00 - 0.10 *  -Comprehensive Metabolic Panel:        Result                      Value             Ref Range           Glucose                     94                74 - 99 mg/dL       Sodium                      131 (L)           136 - 145 mm*       Potassium                   3.3 (L)           3.5 - 5.3 mm*       Chloride                    99                98 - 107 mmo*       Bicarbonate                 23                21 - 32 mmol*       Anion Gap                   12                10 - 20 mmol*       Urea Nitrogen               30 (H)            6 - 23 mg/dL        Creatinine                  1.14              0.50 - 1.30 *       eGFR                         70                >60 mL/min/1*       Calcium                     8.5 (L)           8.6 - 10.3 m*       Albumin                     3.8               3.4 - 5.0 g/*       Alkaline Phosphatase        59                33 - 136 U/L        Total Protein               8.0               6.4 - 8.2 g/*       AST                         47 (H)            9 - 39 U/L          Bilirubin, Total            0.7               0.0 - 1.2 mg*       ALT                         33                10 - 52 U/L    -Urinalysis with Reflex Culture and Microscopic:        Result                      Value             Ref Range           Color, Urine                Yellow            Straw, Yellow       Appearance, Urine           Clear             Clear               Specific Gravity, Urine     1.024             1.005 - 1.035       pH, Urine                   5.0               5.0, 5.5, 6.*       Protein, Urine              NEGATIVE          NEGATIVE mg/*       Glucose, Urine              NEGATIVE          NEGATIVE mg/*       Blood, Urine                NEGATIVE          NEGATIVE            Ketones, Urine              NEGATIVE          NEGATIVE mg/*       Bilirubin, Urine            NEGATIVE          NEGATIVE            Urobilinogen, Urine         <2.0              <2.0 mg/dL          Nitrite, Urine              NEGATIVE          NEGATIVE            Leukocyte Esterase, Ur*     NEGATIVE          NEGATIVE       -Troponin I, High Sensitivity:        Result                      Value             Ref Range           Troponin I, High Sensi*     10                0 - 20 ng/L    -Troponin I, High Sensitivity:        Result                      Value             Ref Range           Troponin I, High Sensi*     10                0 - 20 ng/L    -Lipase:        Result                      Value             Ref Range           Lipase                      44                9 - 82 U/L        CT abdomen pelvis w IV contrast   Final Result    No  acute inflammatory changes in the abdomen and pelvis.    Prostate gland enlargement.    Signed by Tima Angeles DO      Lab work unremarkable.  CT is negative for any acute intra-abdominal or pelvic abnormalities.  Patient remains stable on reevaluation.  Findings are discussed with the patient.  No clear cause of his symptoms, although we suspect a viral gastroenteritis given the history presented.  Instructed to take supportive measures at home and follow-up with his primary care physician.  Home care and return instructions discussed. Patient expressed understanding and agreement. Patient discharged in stable condition.    Homero Santillan DO, PGY-3  Emergency Medicine Resident    Amount and/or Complexity of Data Reviewed  Labs: ordered.  Radiology: ordered and independent interpretation performed.  ECG/medicine tests: ordered and independent interpretation performed.     Details: EKG at 1441 on 12/27/2023 as interpreted by myself: Ventricular rate 60, , QRS 90, QTc 426.  No acute injury pattern.        Procedure  Procedures     Homero Santillan DO  Resident  12/28/23 0042

## 2023-12-27 NOTE — DISCHARGE INSTRUCTIONS
Diabetic diet.    You may take Tylenol as needed for your pain.  Please follow your primary care physician in 2 to 3 days.  Return to the emergency department if you develop fevers, worsening of your symptoms, persistent nausea vomiting and diarrhea causing dehydration, or if you have any questions or concerns.

## 2023-12-28 LAB — HOLD SPECIMEN: NORMAL

## 2023-12-31 LAB
ATRIAL RATE: 60 BPM
P AXIS: 82 DEGREES
P OFFSET: 205 MS
P ONSET: 140 MS
PR INTERVAL: 168 MS
Q ONSET: 224 MS
QRS COUNT: 10 BEATS
QRS DURATION: 90 MS
QT INTERVAL: 426 MS
QTC CALCULATION(BAZETT): 426 MS
QTC FREDERICIA: 426 MS
R AXIS: 68 DEGREES
T AXIS: 47 DEGREES
T OFFSET: 437 MS
VENTRICULAR RATE: 60 BPM

## 2024-02-09 ENCOUNTER — TELEPHONE (OUTPATIENT)
Dept: ENDOCRINOLOGY | Facility: CLINIC | Age: 69
End: 2024-02-09
Payer: MEDICARE

## 2024-02-09 DIAGNOSIS — E27.1 ADDISON'S DISEASE (MULTI): Primary | ICD-10-CM

## 2024-02-09 RX ORDER — PREDNISONE 5 MG/1
5 TABLET ORAL DAILY
Qty: 90 TABLET | Refills: 1 | Status: SHIPPED | OUTPATIENT
Start: 2024-02-09 | End: 2024-05-29 | Stop reason: SDUPTHER

## 2024-02-09 RX ORDER — PREDNISONE 5 MG/1
5 TABLET ORAL DAILY
Status: CANCELLED | OUTPATIENT
Start: 2024-02-09

## 2024-02-09 NOTE — TELEPHONE ENCOUNTER
Adrian left a vm on 2-9-24.  He was first seen 2-2021, 2-2022.  He then  No showed for 2023 and is looking to come back.  You see him for addisons disease.  Question is do you want to consider him new or can he be scheduled in June as an established?  Thank you.  I left a message and told  Him I could call him 2-12-24  [phone]

## 2024-03-05 DIAGNOSIS — R74.8 ABNORMAL LEVELS OF OTHER SERUM ENZYMES: ICD-10-CM

## 2024-03-05 DIAGNOSIS — E78.5 HYPERLIPIDEMIA, UNSPECIFIED HYPERLIPIDEMIA TYPE: ICD-10-CM

## 2024-03-07 RX ORDER — ATORVASTATIN CALCIUM 40 MG/1
40 TABLET, FILM COATED ORAL DAILY
Qty: 90 TABLET | Refills: 3 | Status: SHIPPED | OUTPATIENT
Start: 2024-03-07 | End: 2024-05-09 | Stop reason: SDUPTHER

## 2024-03-20 ENCOUNTER — APPOINTMENT (OUTPATIENT)
Dept: RADIOLOGY | Facility: HOSPITAL | Age: 69
End: 2024-03-20
Payer: MEDICARE

## 2024-03-20 ENCOUNTER — HOSPITAL ENCOUNTER (EMERGENCY)
Facility: HOSPITAL | Age: 69
Discharge: HOME | End: 2024-03-20
Attending: STUDENT IN AN ORGANIZED HEALTH CARE EDUCATION/TRAINING PROGRAM
Payer: MEDICARE

## 2024-03-20 VITALS
SYSTOLIC BLOOD PRESSURE: 126 MMHG | DIASTOLIC BLOOD PRESSURE: 72 MMHG | HEART RATE: 54 BPM | HEIGHT: 67 IN | TEMPERATURE: 97.5 F | RESPIRATION RATE: 16 BRPM | WEIGHT: 218 LBS | BODY MASS INDEX: 34.21 KG/M2 | OXYGEN SATURATION: 98 %

## 2024-03-20 DIAGNOSIS — M54.50 ACUTE RIGHT-SIDED LOW BACK PAIN WITHOUT SCIATICA: Primary | ICD-10-CM

## 2024-03-20 LAB
ALBUMIN SERPL BCP-MCNC: 3.8 G/DL (ref 3.4–5)
ALP SERPL-CCNC: 68 U/L (ref 33–136)
ALT SERPL W P-5'-P-CCNC: 21 U/L (ref 10–52)
ANION GAP SERPL CALC-SCNC: 12 MMOL/L (ref 10–20)
APPEARANCE UR: CLEAR
AST SERPL W P-5'-P-CCNC: 27 U/L (ref 9–39)
BASOPHILS # BLD AUTO: 0.03 X10*3/UL (ref 0–0.1)
BASOPHILS NFR BLD AUTO: 0.3 %
BILIRUB SERPL-MCNC: 0.4 MG/DL (ref 0–1.2)
BILIRUB UR STRIP.AUTO-MCNC: NEGATIVE MG/DL
BUN SERPL-MCNC: 22 MG/DL (ref 6–23)
CALCIUM SERPL-MCNC: 8.9 MG/DL (ref 8.6–10.3)
CHLORIDE SERPL-SCNC: 103 MMOL/L (ref 98–107)
CO2 SERPL-SCNC: 23 MMOL/L (ref 21–32)
COLOR UR: YELLOW
CREAT SERPL-MCNC: 1.2 MG/DL (ref 0.5–1.3)
EGFRCR SERPLBLD CKD-EPI 2021: 65 ML/MIN/1.73M*2
EOSINOPHIL # BLD AUTO: 0.36 X10*3/UL (ref 0–0.7)
EOSINOPHIL NFR BLD AUTO: 4 %
ERYTHROCYTE [DISTWIDTH] IN BLOOD BY AUTOMATED COUNT: 12.4 % (ref 11.5–14.5)
GLUCOSE SERPL-MCNC: 139 MG/DL (ref 74–99)
GLUCOSE UR STRIP.AUTO-MCNC: NEGATIVE MG/DL
HCT VFR BLD AUTO: 43 % (ref 41–52)
HGB BLD-MCNC: 14.5 G/DL (ref 13.5–17.5)
IMM GRANULOCYTES # BLD AUTO: 0.03 X10*3/UL (ref 0–0.7)
IMM GRANULOCYTES NFR BLD AUTO: 0.3 % (ref 0–0.9)
KETONES UR STRIP.AUTO-MCNC: NEGATIVE MG/DL
LEUKOCYTE ESTERASE UR QL STRIP.AUTO: NEGATIVE
LIPASE SERPL-CCNC: 13 U/L (ref 9–82)
LYMPHOCYTES # BLD AUTO: 2.75 X10*3/UL (ref 1.2–4.8)
LYMPHOCYTES NFR BLD AUTO: 30.8 %
MAGNESIUM SERPL-MCNC: 1.95 MG/DL (ref 1.6–2.4)
MCH RBC QN AUTO: 30.5 PG (ref 26–34)
MCHC RBC AUTO-ENTMCNC: 33.7 G/DL (ref 32–36)
MCV RBC AUTO: 90 FL (ref 80–100)
MONOCYTES # BLD AUTO: 0.74 X10*3/UL (ref 0.1–1)
MONOCYTES NFR BLD AUTO: 8.3 %
NEUTROPHILS # BLD AUTO: 5.02 X10*3/UL (ref 1.2–7.7)
NEUTROPHILS NFR BLD AUTO: 56.3 %
NITRITE UR QL STRIP.AUTO: NEGATIVE
NRBC BLD-RTO: 0 /100 WBCS (ref 0–0)
PH UR STRIP.AUTO: 5 [PH]
PLATELET # BLD AUTO: 302 X10*3/UL (ref 150–450)
POTASSIUM SERPL-SCNC: 3.9 MMOL/L (ref 3.5–5.3)
PROT SERPL-MCNC: 7.8 G/DL (ref 6.4–8.2)
PROT UR STRIP.AUTO-MCNC: NEGATIVE MG/DL
RBC # BLD AUTO: 4.76 X10*6/UL (ref 4.5–5.9)
RBC # UR STRIP.AUTO: NEGATIVE /UL
SODIUM SERPL-SCNC: 134 MMOL/L (ref 136–145)
SP GR UR STRIP.AUTO: 1.02
UROBILINOGEN UR STRIP.AUTO-MCNC: <2 MG/DL
WBC # BLD AUTO: 8.9 X10*3/UL (ref 4.4–11.3)

## 2024-03-20 PROCEDURE — 36415 COLL VENOUS BLD VENIPUNCTURE: CPT | Performed by: STUDENT IN AN ORGANIZED HEALTH CARE EDUCATION/TRAINING PROGRAM

## 2024-03-20 PROCEDURE — 74177 CT ABD & PELVIS W/CONTRAST: CPT

## 2024-03-20 PROCEDURE — 83735 ASSAY OF MAGNESIUM: CPT | Performed by: STUDENT IN AN ORGANIZED HEALTH CARE EDUCATION/TRAINING PROGRAM

## 2024-03-20 PROCEDURE — 80053 COMPREHEN METABOLIC PANEL: CPT | Performed by: STUDENT IN AN ORGANIZED HEALTH CARE EDUCATION/TRAINING PROGRAM

## 2024-03-20 PROCEDURE — 99284 EMERGENCY DEPT VISIT MOD MDM: CPT | Mod: 25

## 2024-03-20 PROCEDURE — 2500000004 HC RX 250 GENERAL PHARMACY W/ HCPCS (ALT 636 FOR OP/ED): Performed by: STUDENT IN AN ORGANIZED HEALTH CARE EDUCATION/TRAINING PROGRAM

## 2024-03-20 PROCEDURE — 99285 EMERGENCY DEPT VISIT HI MDM: CPT | Performed by: STUDENT IN AN ORGANIZED HEALTH CARE EDUCATION/TRAINING PROGRAM

## 2024-03-20 PROCEDURE — 83690 ASSAY OF LIPASE: CPT | Performed by: STUDENT IN AN ORGANIZED HEALTH CARE EDUCATION/TRAINING PROGRAM

## 2024-03-20 PROCEDURE — 96374 THER/PROPH/DIAG INJ IV PUSH: CPT

## 2024-03-20 PROCEDURE — 2550000001 HC RX 255 CONTRASTS: Performed by: STUDENT IN AN ORGANIZED HEALTH CARE EDUCATION/TRAINING PROGRAM

## 2024-03-20 PROCEDURE — 96361 HYDRATE IV INFUSION ADD-ON: CPT

## 2024-03-20 PROCEDURE — 74177 CT ABD & PELVIS W/CONTRAST: CPT | Performed by: RADIOLOGY

## 2024-03-20 PROCEDURE — 81003 URINALYSIS AUTO W/O SCOPE: CPT | Performed by: STUDENT IN AN ORGANIZED HEALTH CARE EDUCATION/TRAINING PROGRAM

## 2024-03-20 PROCEDURE — 85025 COMPLETE CBC W/AUTO DIFF WBC: CPT | Performed by: STUDENT IN AN ORGANIZED HEALTH CARE EDUCATION/TRAINING PROGRAM

## 2024-03-20 RX ORDER — NAPROXEN 500 MG/1
500 TABLET ORAL
Qty: 20 TABLET | Refills: 0 | Status: SHIPPED | OUTPATIENT
Start: 2024-03-20 | End: 2024-03-30

## 2024-03-20 RX ORDER — KETOROLAC TROMETHAMINE 15 MG/ML
15 INJECTION, SOLUTION INTRAMUSCULAR; INTRAVENOUS ONCE
Status: COMPLETED | OUTPATIENT
Start: 2024-03-20 | End: 2024-03-20

## 2024-03-20 RX ORDER — TIZANIDINE 4 MG/1
4 TABLET ORAL EVERY 6 HOURS PRN
Qty: 10 TABLET | Refills: 0 | Status: SHIPPED | OUTPATIENT
Start: 2024-03-20 | End: 2024-05-10 | Stop reason: WASHOUT

## 2024-03-20 RX ORDER — LIDOCAINE 50 MG/G
1 PATCH TOPICAL DAILY
Qty: 10 PATCH | Refills: 0 | Status: SHIPPED | OUTPATIENT
Start: 2024-03-20

## 2024-03-20 RX ADMIN — SODIUM CHLORIDE, POTASSIUM CHLORIDE, SODIUM LACTATE AND CALCIUM CHLORIDE 1000 ML: 600; 310; 30; 20 INJECTION, SOLUTION INTRAVENOUS at 03:22

## 2024-03-20 RX ADMIN — KETOROLAC TROMETHAMINE 15 MG: 15 INJECTION, SOLUTION INTRAMUSCULAR; INTRAVENOUS at 03:22

## 2024-03-20 RX ADMIN — IOHEXOL 75 ML: 350 INJECTION, SOLUTION INTRAVENOUS at 03:46

## 2024-03-20 ASSESSMENT — PAIN DESCRIPTION - FREQUENCY: FREQUENCY: CONSTANT/CONTINUOUS

## 2024-03-20 ASSESSMENT — PAIN DESCRIPTION - ONSET: ONSET: SUDDEN

## 2024-03-20 ASSESSMENT — PAIN DESCRIPTION - ORIENTATION: ORIENTATION: RIGHT;MID

## 2024-03-20 ASSESSMENT — LIFESTYLE VARIABLES
HAVE YOU EVER FELT YOU SHOULD CUT DOWN ON YOUR DRINKING: NO
HAVE PEOPLE ANNOYED YOU BY CRITICIZING YOUR DRINKING: NO
EVER FELT BAD OR GUILTY ABOUT YOUR DRINKING: NO

## 2024-03-20 ASSESSMENT — PAIN DESCRIPTION - DESCRIPTORS: DESCRIPTORS: SHARP

## 2024-03-20 ASSESSMENT — PAIN DESCRIPTION - PROGRESSION: CLINICAL_PROGRESSION: NOT CHANGED

## 2024-03-20 ASSESSMENT — PAIN SCALES - GENERAL: PAINLEVEL_OUTOF10: 3

## 2024-03-20 ASSESSMENT — PAIN - FUNCTIONAL ASSESSMENT: PAIN_FUNCTIONAL_ASSESSMENT: 0-10

## 2024-03-20 ASSESSMENT — PAIN DESCRIPTION - LOCATION: LOCATION: BACK

## 2024-03-20 ASSESSMENT — PAIN DESCRIPTION - PAIN TYPE: TYPE: ACUTE PAIN

## 2024-03-20 NOTE — DISCHARGE INSTRUCTIONS
Take the medications as required for your back pain.  If you continue to have symptoms follow-up with your primary care physician.  If you have worsening pain, vomiting, fevers or other concerning symptoms return to the ER for evaluation.  No drinking or driving when taking muscle relaxers as these make you sleepy.

## 2024-03-20 NOTE — ED PROVIDER NOTES
"HPI   Chief Complaint   Patient presents with    Flank Pain     Patient states, \"I started around 2 weeks ago, now just more noticeable Right flank pain, no blood in urine, more frequent urination\". \" I did have Lithotripsy 6-7 months ago\".       68-year-old male with past medical history of Jama's disease, hyperlipidemia and kidney stones presenting to the ED for right-sided back pain.  States his symptoms started about 2 weeks ago and have been progressively worsening.  Pain is located in the right lower lumbar and radiates into the right lower abdomen.  Denies testicular pain, nausea, vomiting, fevers, chills, dysuria, hematuria or changes in stools.  States this feels similar to his prior kidney stones.                          Napoleon Coma Scale Score: 15                     Patient History   Past Medical History:   Diagnosis Date    Encounter for issue of repeat prescription 12/03/2020    Encounter for medication refill    Hypertension     Personal history of other endocrine, nutritional and metabolic disease 11/30/2020    History of [place]'s disease    Prediabetes      Past Surgical History:   Procedure Laterality Date    OTHER SURGICAL HISTORY  08/15/2022    Cholecystectomy laparoscopic     Family History   Problem Relation Name Age of Onset    Ovarian cancer Mother      Peripheral vascular disease Mother      COPD Mother      Lung cancer Mother      Heart attack Mother      COPD Father      Heart failure Father      Heart attack Father      Heart attack Brother       Social History     Tobacco Use    Smoking status: Never    Smokeless tobacco: Never   Vaping Use    Vaping Use: Never used   Substance Use Topics    Alcohol use: Yes     Comment: occasional    Drug use: Never       Physical Exam   ED Triage Vitals [03/20/24 0215]   Temperature Heart Rate Respirations BP   36.4 °C (97.5 °F) 66 16 160/79      Pulse Ox Temp Source Heart Rate Source Patient Position   96 % Tympanic Monitor Sitting      BP " Location FiO2 (%)     Left arm --       Physical Exam  Vitals and nursing note reviewed.   Constitutional:       General: He is not in acute distress.     Appearance: He is not ill-appearing or toxic-appearing.   HENT:      Head: Normocephalic and atraumatic.      Nose: Nose normal.      Mouth/Throat:      Mouth: Mucous membranes are moist.   Eyes:      Extraocular Movements: Extraocular movements intact.      Conjunctiva/sclera: Conjunctivae normal.   Cardiovascular:      Rate and Rhythm: Normal rate and regular rhythm.      Pulses: Normal pulses.      Heart sounds: Normal heart sounds.   Pulmonary:      Effort: Pulmonary effort is normal.      Breath sounds: Normal breath sounds.   Abdominal:      General: There is no distension.      Palpations: Abdomen is soft.      Tenderness: There is no abdominal tenderness. There is no right CVA tenderness or left CVA tenderness.   Musculoskeletal:         General: Tenderness (right paraspinal lumbar) present. Normal range of motion.      Cervical back: Normal range of motion and neck supple.      Right lower leg: No edema.      Left lower leg: No edema.   Skin:     General: Skin is warm and dry.      Capillary Refill: Capillary refill takes less than 2 seconds.   Neurological:      General: No focal deficit present.      Mental Status: He is alert and oriented to person, place, and time. Mental status is at baseline.         ED Course & MDM   Diagnoses as of 03/20/24 0623   Acute right-sided low back pain without sciatica       Medical Decision Making  68-year-old male presenting to the ED for right-sided back pain.  He is nontoxic-appearing and vitals are stable.  He was treated with fluids and Toradol for pain.  Pain is located in the right lumbar.  No midline tenderness, low suspicion for acute fractures.  No red flag symptoms for cauda equina.    Labs were obtained and were noncontributory.  Normal renal function.  No infection on urinalysis.  No evidence of  pancreatitis.  CT abdomen pelvis shows a stable right inguinal hernia containing fat and prostatomegaly.  No kidney stones, bowel obstruction or infectious process.    On reevaluation patient was sleeping.  Discussed results and incidental findings on CT.  He was given prescriptions for lidocaine patches, naproxen and muscle relaxers for musculoskeletal pain.  He will follow-up with his PCP and return to the ED if he has worsening symptoms.        Procedure  Procedures     Windy Garcia DO  Resident  03/20/24 7311

## 2024-03-20 NOTE — ED TRIAGE NOTES
"Patient states, \"I started around 2 weeks ago, now just more noticeable Right flank pain, no blood in urine, more frequent urination\". \" I did have Lithotripsy 6-7 months ago\".  "

## 2024-04-04 ENCOUNTER — APPOINTMENT (OUTPATIENT)
Dept: PRIMARY CARE | Facility: CLINIC | Age: 69
End: 2024-04-04
Payer: MEDICARE

## 2024-04-07 DIAGNOSIS — E03.9 HYPOTHYROIDISM, UNSPECIFIED TYPE: ICD-10-CM

## 2024-04-09 RX ORDER — LEVOTHYROXINE SODIUM 50 UG/1
50 TABLET ORAL
Qty: 90 TABLET | Refills: 3 | Status: SHIPPED | OUTPATIENT
Start: 2024-04-09

## 2024-05-09 ENCOUNTER — TELEPHONE (OUTPATIENT)
Dept: PRIMARY CARE | Facility: CLINIC | Age: 69
End: 2024-05-09

## 2024-05-09 ENCOUNTER — OFFICE VISIT (OUTPATIENT)
Dept: PRIMARY CARE | Facility: CLINIC | Age: 69
End: 2024-05-09
Payer: MEDICARE

## 2024-05-09 VITALS
OXYGEN SATURATION: 98 % | HEART RATE: 62 BPM | HEIGHT: 67 IN | BODY MASS INDEX: 34.21 KG/M2 | RESPIRATION RATE: 18 BRPM | TEMPERATURE: 97 F | WEIGHT: 218 LBS | SYSTOLIC BLOOD PRESSURE: 125 MMHG | DIASTOLIC BLOOD PRESSURE: 76 MMHG

## 2024-05-09 DIAGNOSIS — R73.9 HYPERGLYCEMIA, UNSPECIFIED: ICD-10-CM

## 2024-05-09 DIAGNOSIS — R73.03 PREDIABETES: Primary | ICD-10-CM

## 2024-05-09 DIAGNOSIS — Z12.5 SCREENING FOR PROSTATE CANCER: ICD-10-CM

## 2024-05-09 DIAGNOSIS — Z00.00 ROUTINE GENERAL MEDICAL EXAMINATION AT HEALTH CARE FACILITY: ICD-10-CM

## 2024-05-09 DIAGNOSIS — E03.8 SUBCLINICAL HYPOTHYROIDISM: ICD-10-CM

## 2024-05-09 DIAGNOSIS — E78.5 HYPERLIPIDEMIA, UNSPECIFIED HYPERLIPIDEMIA TYPE: ICD-10-CM

## 2024-05-09 DIAGNOSIS — Z13.1 DIABETES MELLITUS SCREENING: ICD-10-CM

## 2024-05-09 DIAGNOSIS — E27.1 ADDISON'S DISEASE (MULTI): ICD-10-CM

## 2024-05-09 DIAGNOSIS — S39.012D STRAIN OF LUMBAR REGION, SUBSEQUENT ENCOUNTER: ICD-10-CM

## 2024-05-09 DIAGNOSIS — K40.90 INGUINAL HERNIA WITHOUT OBSTRUCTION OR GANGRENE, RECURRENCE NOT SPECIFIED, UNSPECIFIED LATERALITY: ICD-10-CM

## 2024-05-09 PROBLEM — K29.70 GASTRITIS: Status: ACTIVE | Noted: 2024-05-09

## 2024-05-09 PROBLEM — R10.9 RIGHT FLANK PAIN: Status: ACTIVE | Noted: 2024-05-09

## 2024-05-09 PROBLEM — D72.829 LEUKOCYTOSIS: Status: ACTIVE | Noted: 2024-05-09

## 2024-05-09 PROBLEM — R10.9 ABDOMINAL PAIN: Status: RESOLVED | Noted: 2023-01-23 | Resolved: 2024-05-09

## 2024-05-09 PROBLEM — S39.012A LUMBAR STRAIN: Status: ACTIVE | Noted: 2024-05-09

## 2024-05-09 PROBLEM — L82.1 OTHER SEBORRHEIC KERATOSIS: Status: ACTIVE | Noted: 2021-06-07

## 2024-05-09 PROBLEM — L71.9 ROSACEA, UNSPECIFIED: Status: ACTIVE | Noted: 2021-06-07

## 2024-05-09 PROBLEM — L81.4 OTHER MELANIN HYPERPIGMENTATION: Status: ACTIVE | Noted: 2021-06-07

## 2024-05-09 PROBLEM — D18.01 HEMANGIOMA OF SKIN AND SUBCUTANEOUS TISSUE: Status: ACTIVE | Noted: 2021-06-07

## 2024-05-09 PROBLEM — D22.5 MELANOCYTIC NEVI OF TRUNK: Status: ACTIVE | Noted: 2021-06-07

## 2024-05-09 PROBLEM — L73.8 OTHER SPECIFIED FOLLICULAR DISORDERS: Status: ACTIVE | Noted: 2021-06-07

## 2024-05-09 PROBLEM — N20.1 CALCULUS OF URETER: Status: ACTIVE | Noted: 2024-05-09

## 2024-05-09 PROBLEM — E87.1 HYPONATREMIA: Status: ACTIVE | Noted: 2024-05-09

## 2024-05-09 PROBLEM — R63.0 LOSS OF APPETITE: Status: ACTIVE | Noted: 2024-05-09

## 2024-05-09 LAB — POC HEMOGLOBIN A1C: 5.8 % (ref 4.2–6.5)

## 2024-05-09 PROCEDURE — 1124F ACP DISCUSS-NO DSCNMKR DOCD: CPT | Performed by: FAMILY MEDICINE

## 2024-05-09 PROCEDURE — 1170F FXNL STATUS ASSESSED: CPT | Performed by: FAMILY MEDICINE

## 2024-05-09 PROCEDURE — 1160F RVW MEDS BY RX/DR IN RCRD: CPT | Performed by: FAMILY MEDICINE

## 2024-05-09 PROCEDURE — G0439 PPPS, SUBSEQ VISIT: HCPCS | Performed by: FAMILY MEDICINE

## 2024-05-09 PROCEDURE — 83036 HEMOGLOBIN GLYCOSYLATED A1C: CPT | Performed by: FAMILY MEDICINE

## 2024-05-09 PROCEDURE — 1159F MED LIST DOCD IN RCRD: CPT | Performed by: FAMILY MEDICINE

## 2024-05-09 RX ORDER — ATORVASTATIN CALCIUM 40 MG/1
40 TABLET, FILM COATED ORAL DAILY
Qty: 90 TABLET | Refills: 3 | Status: SHIPPED | OUTPATIENT
Start: 2024-05-09

## 2024-05-09 ASSESSMENT — ENCOUNTER SYMPTOMS
DEPRESSION: 0
OCCASIONAL FEELINGS OF UNSTEADINESS: 0
LOSS OF SENSATION IN FEET: 0

## 2024-05-09 ASSESSMENT — ACTIVITIES OF DAILY LIVING (ADL)
GROCERY_SHOPPING: INDEPENDENT
MANAGING_FINANCES: INDEPENDENT
BATHING: INDEPENDENT
DRESSING: INDEPENDENT
DOING_HOUSEWORK: INDEPENDENT
TAKING_MEDICATION: INDEPENDENT

## 2024-05-09 ASSESSMENT — PATIENT HEALTH QUESTIONNAIRE - PHQ9
1. LITTLE INTEREST OR PLEASURE IN DOING THINGS: NOT AT ALL
SUM OF ALL RESPONSES TO PHQ9 QUESTIONS 1 AND 2: 0
2. FEELING DOWN, DEPRESSED OR HOPELESS: NOT AT ALL

## 2024-05-09 NOTE — ASSESSMENT & PLAN NOTE
Patient is also here for follow-up of hyperlipidemia. The most recent measurements for this patient would take it on.. 10/23     .... Associated symptoms do not include chest pain, Cramps with exertion,   yes  myalgias  .. Back  pain  or nausea. Current treatment includes statins. By report there is good compliance with treatment. Pertinent medical history includes hypothyroid.  Recheck labs to ensure stability stable and continue meds discussed back pain associate with statin and consider statin medication

## 2024-05-09 NOTE — ASSESSMENT & PLAN NOTE
Patient has had recurrent back pain right lumbar spine.  CTs have been negative for kidney issues.  Discussed with patient urine testing negative as well no evidence of aneurysm on CT.  Will get x-ray and encouraged back strengthening handout given along with PT referral and x-ray of lumbar spine ordered.  Hopefully this will help and advised patient to try to avoid further CTs in light of 3 in the past 8 months.

## 2024-05-09 NOTE — ASSESSMENT & PLAN NOTE
HERE FOR RECHK OF THYROIDLast clinic visit was month (S) ago. Symptoms do not include weight gain, cold intolerance, fatigue, weakness, appetite, hair loss, dry skin    There is no known event that preceded symptom onset.  . Current treatment includes levothyroxine. Report there is good compliance with medical treatment.  Reviewed T4 TSH please see note will repeat studies.  Stable and continue medications

## 2024-05-09 NOTE — PROGRESS NOTES
Subjective   Patient ID: Adrian Rosa is a 68 y.o. male who presents for Medicare Annual Wellness Visit Subsequent.  HPI  Seen in the emergency department and  Patient was [facility] for evaluation regarding abdominal discomfort on 12/27.  Patient however states that the reason he presented to the ER was due to low back pain.  In addition he had according to the ER report rectal bleeding    However patient currently..  Denies rectal bleeding and in fact states he had predominantly back pain at the time.  Evaluation included lab testing which showed normal white count no anemia with urine testing unremarkable there is no blood in the urine.  Troponin testing was negative lipase.  Prostate gland was slightly enlarged on the CAT scan.  Patient was advised to follow-up with his primary care physician with diagnoses of generalized abdominal pain however patient states clearly this was for low lumbar spine pain.    Presented to ED once again on 3/20/2024.  He stated according to the emergency room records she had flank pain that is started about 2 weeks prior noticeable on the right flank no blood in his urine more frequent urination did have status post lithotripsy approxi-7 months prior to visit.  Emergency room report indicates he had right-sided back pain and denies any fever chills burning with urination or blood in the urine.  In the ED he was nontoxic given IV fluids and Toradol and CAT scan revealed right inguinal hernia along with enlarged prostate.  Patient was given prescription for lidocaine patches anti-inflammatories and muscle relaxers advised to follow-up with PCP.  Results of testing specifically CMP showed sodium slightly low at 134 with potassium normal at 3.9 sugar at 139 with normal kidney function along with normal liver function studies.  Imaging testing otherwise specifically CT abdomen and pelvis showed normal liver status postcholecystectomy atrophy of the pancreas with calcific  arthrosclerosis of the aortoiliac vessels and enlarged prostate.  With a right inguinal hernia.    Patient is here for Medicare physical and also prescription refills today he is having no other complaints at this time    Patient is also here for follow-up of hyperlipidemia. The most recent measurements for this patient would take it on.. 10/23     .... Associated symptoms do not include chest pain, Cramps with exertion,   yes  myalgias  .. Back  pain  or nausea. Current treatment includes statins. By report there is good compliance with treatment. Pertinent medical history includes hypothyroid.  HERE FOR RECHK OF THYROIDLast clinic visit was month (S) ago. Symptoms do not include weight gain, cold intolerance, fatigue, weakness, appetite, hair loss, dry skin    There is no known event that preceded symptom onset.  . Current treatment includes levothyroxine. Report there is good compliance with medical treatment.   Review of Systems  All other  pertinent  systems reviewed and are negative except  those  mentioned  in HPI   Objective   Physical Exam  general: alert oriented x three  HEENT hearing normal to voice  Neck supple  Lungs respirations non-labored.  Cardiovascular: no peripheral edema  Skin: warm and dry without rash  Psych: judgement and insight normal  Musculoskeletal:  ambulation normal,   Back with tenderness of lumbar spine right-sided L3-4 location straight leg raising negative neurological without deficits  lymph:negative cervical  LYMPADENOPATHY  thyroid: non palpable enlargement   Labs    Results of Free T41.13 Dated 6/23 with TSH same date 1.84    Assessment/Plan   Problem List Items Addressed This Visit       [place]'s disease (Multi)    Hyperlipidemia     Patient is also here for follow-up of hyperlipidemia. The most recent measurements for this patient would take it on.. 10/23     .... Associated symptoms do not include chest pain, Cramps with exertion,   yes  myalgias  .. Back  pain  or nausea.  Current treatment includes statins. By report there is good compliance with treatment. Pertinent medical history includes hypothyroid.  Recheck labs to ensure stability stable and continue meds discussed back pain associate with statin and consider statin medication            Relevant Medications    atorvastatin (Lipitor) 40 mg tablet    Prediabetes - Primary    Relevant Orders    POCT glycosylated hemoglobin (Hb A1C) manually resulted (Completed)    Subclinical hypothyroidism     HERE FOR RECHK OF THYROIDLast clinic visit was month (S) ago. Symptoms do not include weight gain, cold intolerance, fatigue, weakness, appetite, hair loss, dry skin    There is no known event that preceded symptom onset.  . Current treatment includes levothyroxine. Report there is good compliance with medical treatment.  Reviewed T4 TSH please see note will repeat studies.  Stable and continue medications         Relevant Orders    Thyroxine, Free    Thyroid Stimulating Hormone    Lumbar strain     Patient has had recurrent back pain right lumbar spine.  CTs have been negative for kidney issues.  Discussed with patient urine testing negative as well no evidence of aneurysm on CT.  Will get x-ray and encouraged back strengthening handout given along with PT referral and x-ray of lumbar spine ordered.  Hopefully this will help and advised patient to try to avoid further CTs in light of 3 in the past 8 months.         Relevant Orders    XR lumbar spine 2-3 views    Referral to Physical Therapy     Other Visit Diagnoses       Hyperglycemia, unspecified        Relevant Orders    POCT glycosylated hemoglobin (Hb A1C) manually resulted    Diabetes mellitus screening        Relevant Orders    POCT glycosylated hemoglobin (Hb A1C) manually resulted    Screening for prostate cancer        Relevant Orders    Prostate Specific Antigen, Screen    Routine general medical examination at health care facility            Inguinal hernia referral to  general surgery     MEDICARE SUMMARY   Colorectal cancer: Screening  adults aged 50-75... In light of your last colonoscopy being 2023.  rePete... 2028. Please follow the recommendations of the gastroenterologist.    HIV infection: Screening:  Those adults at risk H 15-65 years  Not indicated   High blood pressure: Screening  and home monitoring... Adults with history and at risk   Monitor your blood pressure at home and notify if blood pressure consistently over 140 systolic 90 diastolic   Diabetes mellitus (type II (and abnormal blood glucose: Screening--adults H 40-70 years who are overweight or obese..  A1c today at 5.8 and encouraged  Falls prevention in community swelling older adults:.. Interventions--- adults 65 or older  Healthful diet and physical activity for C VD disease prevention: Counseling--adults with CVD risk factors  Hepatitis C virus infection: Screening--adults at high risk and adults born between 1945 and 1965 not indicated  Abdominal aortic aneurysm: Screening  men ages 65-75 years who have ever    smoked previously screening..  With CT scan negative for aneurysm  Aspirin use to prevent CVD and CRC:  Preventitive medication --adults aged 50-59 years with a greater than or equal to 10% 10 year CVD risk  Lung cancer: Screening --adults ages 55-80 with a 30 pack year smoking history and currently smoke or have quit within the past 15 years not indicated  Statin use for the primary prevention of CVD: Preventative medicine--adults  40-75 years with no history of CVD, one or more CVD risk factors, and a calculated 10 year CVD event risk of 10% or greater..  On statin and will repeat to confirm stability  IMMUNIZATION  UPDATES .  P20 previously given  In order to see the patient ,preparation to see the patient ,that is review of tests, obtaining and/or reviewing separately obtained history, performing a medically appropriate examination and/or evaluation, counseling and educating the  patient/family/caregiver, and ordering medications, tests or procedures was performed.  In addition referring and communicating with other healthcare professionals (without separately reported) and documenting clinical information in the electronic or other health record, and finally independently interpreting results (not separately reported) and communicated results of the patient/family/caregiver required  39 minutes...

## 2024-05-10 NOTE — TELEPHONE ENCOUNTER
Called pt 05/10/2024   N/A left detailed vm with information that referral was put into pt's chart  Gave scheduling line # and our office number # to call to schedule

## 2024-05-29 ENCOUNTER — OFFICE VISIT (OUTPATIENT)
Dept: ENDOCRINOLOGY | Facility: CLINIC | Age: 69
End: 2024-05-29
Payer: MEDICARE

## 2024-05-29 ENCOUNTER — LAB (OUTPATIENT)
Dept: LAB | Facility: LAB | Age: 69
End: 2024-05-29
Payer: MEDICARE

## 2024-05-29 VITALS
DIASTOLIC BLOOD PRESSURE: 60 MMHG | WEIGHT: 220 LBS | BODY MASS INDEX: 34.53 KG/M2 | HEIGHT: 67 IN | SYSTOLIC BLOOD PRESSURE: 136 MMHG

## 2024-05-29 DIAGNOSIS — E27.1 ADDISON'S DISEASE (MULTI): Primary | ICD-10-CM

## 2024-05-29 DIAGNOSIS — E03.8 SUBCLINICAL HYPOTHYROIDISM: ICD-10-CM

## 2024-05-29 DIAGNOSIS — N28.9 RENAL INSUFFICIENCY: ICD-10-CM

## 2024-05-29 DIAGNOSIS — E03.9 HYPOTHYROIDISM, UNSPECIFIED TYPE: ICD-10-CM

## 2024-05-29 DIAGNOSIS — Z12.5 SCREENING FOR PROSTATE CANCER: ICD-10-CM

## 2024-05-29 LAB
ANION GAP SERPL CALC-SCNC: 9 MMOL/L (ref 10–20)
BUN SERPL-MCNC: 16 MG/DL (ref 6–23)
CALCIUM SERPL-MCNC: 8.7 MG/DL (ref 8.6–10.3)
CHLORIDE SERPL-SCNC: 105 MMOL/L (ref 98–107)
CO2 SERPL-SCNC: 25 MMOL/L (ref 21–32)
CREAT SERPL-MCNC: 1.13 MG/DL (ref 0.5–1.3)
EGFRCR SERPLBLD CKD-EPI 2021: 70 ML/MIN/1.73M*2
GLUCOSE SERPL-MCNC: 80 MG/DL (ref 74–99)
POTASSIUM SERPL-SCNC: 4.2 MMOL/L (ref 3.5–5.3)
PSA SERPL-MCNC: 0.81 NG/ML
SODIUM SERPL-SCNC: 135 MMOL/L (ref 136–145)
T4 FREE SERPL-MCNC: 0.68 NG/DL (ref 0.61–1.12)
TSH SERPL-ACNC: 1.94 MIU/L (ref 0.44–3.98)

## 2024-05-29 PROCEDURE — 1123F ACP DISCUSS/DSCN MKR DOCD: CPT | Performed by: STUDENT IN AN ORGANIZED HEALTH CARE EDUCATION/TRAINING PROGRAM

## 2024-05-29 PROCEDURE — 1159F MED LIST DOCD IN RCRD: CPT | Performed by: STUDENT IN AN ORGANIZED HEALTH CARE EDUCATION/TRAINING PROGRAM

## 2024-05-29 PROCEDURE — 84439 ASSAY OF FREE THYROXINE: CPT

## 2024-05-29 PROCEDURE — 80048 BASIC METABOLIC PNL TOTAL CA: CPT

## 2024-05-29 PROCEDURE — 84443 ASSAY THYROID STIM HORMONE: CPT

## 2024-05-29 PROCEDURE — 1160F RVW MEDS BY RX/DR IN RCRD: CPT | Performed by: STUDENT IN AN ORGANIZED HEALTH CARE EDUCATION/TRAINING PROGRAM

## 2024-05-29 PROCEDURE — 36415 COLL VENOUS BLD VENIPUNCTURE: CPT

## 2024-05-29 PROCEDURE — 99214 OFFICE O/P EST MOD 30 MIN: CPT | Performed by: STUDENT IN AN ORGANIZED HEALTH CARE EDUCATION/TRAINING PROGRAM

## 2024-05-29 PROCEDURE — G0103 PSA SCREENING: HCPCS

## 2024-05-29 RX ORDER — PREDNISONE 5 MG/1
5 TABLET ORAL DAILY
Qty: 90 TABLET | Refills: 3 | Status: SHIPPED | OUTPATIENT
Start: 2024-05-29

## 2024-05-29 ASSESSMENT — ENCOUNTER SYMPTOMS: CONSTITUTIONAL NEGATIVE: 1

## 2024-05-29 NOTE — PROGRESS NOTES
"Subjective   Patient ID: Adrian Rosa is a 68 y.o. male who presents for Hypothyroidism (No current labs , pt currently taking Levothyroxine 50 mcg, no missed doses and taking correctly. Jama disease patient taking prednisone 5 mg qd).  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 in [place] until 2006 , after he moved to [place] he did not follow with Endocrinology and was managed by pcp . His new pcp recommended following with Endocrinology.  He takes prednisone 5 mg daily , he is prescribed 7.5 mg .  He is not on Fludrocortisone , he states that that was never prescribed Fludrocortisone and doesnot want to start it as he feels good  He denies hypotension , dizziness , diarrhea or vomiting   he states that he had significant salt cravings before diagnosis , but no cravings since he started Prednisone.     for hypothyroidism , this was diagnosed with with hypothyroidism in 2021 , he started Lt4 25 mcg and this was uptitrated to 50 mcg  he is a  at [place]    Review of Systems   Constitutional: Negative.        Objective   Physical Exam  Constitutional:       Appearance: Normal appearance.   Cardiovascular:      Rate and Rhythm: Normal rate and regular rhythm.   Pulmonary:      Effort: Pulmonary effort is normal.      Breath sounds: Normal breath sounds.   Musculoskeletal:      Cervical back: Neck supple.   Neurological:      Mental Status: He is alert.   Psychiatric:         Mood and Affect: Mood normal.      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 I will hold off starting Flucocotisone is in setting of normal Potassium,   Low normal sodium and normal BP.  - we discussed sick day management for 2-3 days   - Subclinical hypothyroidism , clinically and biochemically euthyroid on Lt4 50 mcg ( managed by pcp )- due for lab recheck today.      RTC in 1 year

## 2024-11-15 ENCOUNTER — APPOINTMENT (OUTPATIENT)
Dept: PRIMARY CARE | Facility: CLINIC | Age: 69
End: 2024-11-15
Payer: MEDICARE

## 2024-11-20 ENCOUNTER — APPOINTMENT (OUTPATIENT)
Dept: PRIMARY CARE | Facility: CLINIC | Age: 69
End: 2024-11-20
Payer: MEDICARE

## 2024-11-20 VITALS
BODY MASS INDEX: 33.43 KG/M2 | SYSTOLIC BLOOD PRESSURE: 112 MMHG | HEIGHT: 67 IN | TEMPERATURE: 97 F | HEART RATE: 63 BPM | RESPIRATION RATE: 18 BRPM | WEIGHT: 213 LBS | OXYGEN SATURATION: 96 % | DIASTOLIC BLOOD PRESSURE: 68 MMHG

## 2024-11-20 DIAGNOSIS — R04.0 EPISTAXIS REQUIRING CAUTERIZATION: Primary | ICD-10-CM

## 2024-11-20 DIAGNOSIS — R07.89 OTHER CHEST PAIN: ICD-10-CM

## 2024-11-20 DIAGNOSIS — E03.9 HYPOTHYROIDISM, UNSPECIFIED TYPE: ICD-10-CM

## 2024-11-20 DIAGNOSIS — E78.5 HYPERLIPIDEMIA, UNSPECIFIED HYPERLIPIDEMIA TYPE: ICD-10-CM

## 2024-11-20 PROCEDURE — 3008F BODY MASS INDEX DOCD: CPT | Performed by: FAMILY MEDICINE

## 2024-11-20 PROCEDURE — 1123F ACP DISCUSS/DSCN MKR DOCD: CPT | Performed by: FAMILY MEDICINE

## 2024-11-20 PROCEDURE — 1160F RVW MEDS BY RX/DR IN RCRD: CPT | Performed by: FAMILY MEDICINE

## 2024-11-20 PROCEDURE — 99214 OFFICE O/P EST MOD 30 MIN: CPT | Performed by: FAMILY MEDICINE

## 2024-11-20 PROCEDURE — 1159F MED LIST DOCD IN RCRD: CPT | Performed by: FAMILY MEDICINE

## 2024-11-20 PROCEDURE — 93000 ELECTROCARDIOGRAM COMPLETE: CPT | Performed by: FAMILY MEDICINE

## 2024-11-20 PROCEDURE — 30901 CONTROL OF NOSEBLEED: CPT | Performed by: FAMILY MEDICINE

## 2024-11-20 PROCEDURE — 1036F TOBACCO NON-USER: CPT | Performed by: FAMILY MEDICINE

## 2024-11-20 RX ORDER — LEVOTHYROXINE SODIUM 50 UG/1
50 TABLET ORAL
Qty: 90 TABLET | Refills: 3 | Status: SHIPPED | OUTPATIENT
Start: 2024-11-20

## 2024-11-20 NOTE — PROGRESS NOTES
Subjective   Patient ID: Adrian Rosa is a 68 y.o. male who presents for Follow-up.  HPI  Patient here for 6-month follow-up complains of substernal shooting chest pain radiating downwards no radiation to arm or other places occurred once scale of 2-3 on a scale of 10 for pain..      pain  shot    .several  minutes...  then  gone      . . Heartburn    Has had a nosebleed that has occurred over the weekend like twice that is gushing down has a history of possible cyst from 10 years ago...... history of hyperlipidemia no complaints of muscle cramps or weakness   Patient is also here for follow-up of hyperlipidemia. The most recent measurements for this patient would take it on.Cholesterol dated 6/7/2023 123 with HDL 36 LDL 70 triglycerides 82 with last liver profile normal dated 3/24 with liver test within normal limits.  ... Associated symptoms do not include chest pain, Cramps with exertion, myalgias or nausea. Current treatment includes statins. By report there is good compliance with treatment. Pertinent medical history includes diabetes and hypertension   Epistaxis Management    Date/Time: 11/20/2024 8:33 PM    Performed by: Nikunj Ybarra DO  Authorized by: Nikunj Ybarra DO    Consent:     Consent obtained:  Verbal    Consent given by:  Patient    Risks discussed:  Bleeding    Alternatives discussed:  No treatment  Universal protocol:     Patient identity confirmed:  Verbally with patient  Procedure details:     Treatment site:  L anterior    Treatment method:  Silver nitrate    Treatment complexity:  Limited    Treatment episode: initial    Post-procedure details:     Procedure completion:  Tolerated well, no immediate complications  Comments:      Using silver nitrate cautery application in the left nares and the area of kesel box plexus plexus to visible vessel not currently bleeding..  Patient tolerated and nosebleeding instruction handout given post procedure    Review of Systems  All other  pertinent   systems reviewed and are negative except  those  mentioned  in HPI   Objective   Physical Exam   Vitals: I have reviewed the vitals  General: Well-developed.  In no acute distress.  Eyes:   sclera nonicteric.  Conjunctiva not injected.  No discharge.   HEAD: Normocephalic, atraumatic.  HEENT   Mucous membranes moist.  Kesselback  plexus left with  hemorrhage  Posterior oropharynx nonerythematous, no tonsillar exudates.      No cervical lymphadenopathy.  Cardio: Regular rate and rhythm.  No murmur, rub or gallop.  Pulmonary: Lungs clear to auscultation in all fields.  No accessory muscle use.  GI/: Normal active bowel sounds.  Soft, nontender.  No masses or organomegaly appreciated.  Musculoskeletal: No gross deformities appreciated.  Neuro: Alert, age-appropriate.  Normal muscle tone.  Moving all extremities.  Skin: No rash, bruises or lesions.   Labs    Nasalcautery     Assessment/Plan   Problem List Items Addressed This Visit       Hyperlipidemia    Relevant Orders    Comprehensive Metabolic Panel    Lipid Panel    Epistaxis requiring cauterization - Primary     Nosebleeding handout discussed patient encouraged to try to follow.  Use vaporizer in the bedroom.  Nasal cautery performed.         Relevant Orders    CBC and Auto Differential    Magnesium    Other chest pain     Chest pain concerning lasting several minutes occurring at rest while walking in from 1 room to another.  Not accompanied by diaphoresis or neck pain.  Strong family history in light of the above EKG performed results indicate and will get cardiology opinion if occurs again to ER         Relevant Orders    Referral to Cardiology    ECG 12 Lead (Completed)     Other Visit Diagnoses       Hypothyroidism, unspecified type        Relevant Medications    levothyroxine (Synthroid, Levoxyl) 50 mcg tablet    Other Relevant Orders    Thyroxine, Free    Thyroid Stimulating Hormone        EKG shows  Normal sinus rhythm  Normal axis  Increased  ventricular activation time  For R wave progression  No change in EKG dated

## 2024-11-20 NOTE — ASSESSMENT & PLAN NOTE
Nosebleeding handout discussed patient encouraged to try to follow.  Use vaporizer in the bedroom.  Nasal cautery performed.

## 2024-11-20 NOTE — ASSESSMENT & PLAN NOTE
Chest pain concerning lasting several minutes occurring at rest while walking in from 1 room to another.  Not accompanied by diaphoresis or neck pain.  Strong family history in light of the above EKG performed results indicate and will get cardiology opinion if occurs again to ER

## 2024-11-20 NOTE — PATIENT INSTRUCTIONS

## 2024-11-26 ENCOUNTER — LAB (OUTPATIENT)
Dept: LAB | Facility: LAB | Age: 69
End: 2024-11-26
Payer: MEDICARE

## 2024-11-26 DIAGNOSIS — E78.5 HYPERLIPIDEMIA, UNSPECIFIED HYPERLIPIDEMIA TYPE: ICD-10-CM

## 2024-11-26 DIAGNOSIS — E03.9 HYPOTHYROIDISM, UNSPECIFIED TYPE: ICD-10-CM

## 2024-11-26 DIAGNOSIS — R04.0 EPISTAXIS REQUIRING CAUTERIZATION: ICD-10-CM

## 2024-11-26 LAB
ALBUMIN SERPL BCP-MCNC: 3.8 G/DL (ref 3.4–5)
ALP SERPL-CCNC: 78 U/L (ref 33–136)
ALT SERPL W P-5'-P-CCNC: 22 U/L (ref 10–52)
ANION GAP SERPL CALC-SCNC: 12 MMOL/L (ref 10–20)
AST SERPL W P-5'-P-CCNC: 23 U/L (ref 9–39)
BASOPHILS # BLD AUTO: 0.03 X10*3/UL (ref 0–0.1)
BASOPHILS NFR BLD AUTO: 0.4 %
BILIRUB SERPL-MCNC: 0.5 MG/DL (ref 0–1.2)
BUN SERPL-MCNC: 17 MG/DL (ref 6–23)
CALCIUM SERPL-MCNC: 8.9 MG/DL (ref 8.6–10.6)
CHLORIDE SERPL-SCNC: 103 MMOL/L (ref 98–107)
CHOLEST SERPL-MCNC: 129 MG/DL (ref 0–199)
CHOLESTEROL/HDL RATIO: 3.5
CO2 SERPL-SCNC: 25 MMOL/L (ref 21–32)
CREAT SERPL-MCNC: 1.23 MG/DL (ref 0.5–1.3)
EGFRCR SERPLBLD CKD-EPI 2021: 64 ML/MIN/1.73M*2
EOSINOPHIL # BLD AUTO: 0.53 X10*3/UL (ref 0–0.7)
EOSINOPHIL NFR BLD AUTO: 6.6 %
ERYTHROCYTE [DISTWIDTH] IN BLOOD BY AUTOMATED COUNT: 12.2 % (ref 11.5–14.5)
GLUCOSE SERPL-MCNC: 85 MG/DL (ref 74–99)
HCT VFR BLD AUTO: 41.6 % (ref 41–52)
HDLC SERPL-MCNC: 36.5 MG/DL
HGB BLD-MCNC: 14.5 G/DL (ref 13.5–17.5)
IMM GRANULOCYTES # BLD AUTO: 0.01 X10*3/UL (ref 0–0.7)
IMM GRANULOCYTES NFR BLD AUTO: 0.1 % (ref 0–0.9)
LDLC SERPL CALC-MCNC: 77 MG/DL
LYMPHOCYTES # BLD AUTO: 3.32 X10*3/UL (ref 1.2–4.8)
LYMPHOCYTES NFR BLD AUTO: 41.7 %
MAGNESIUM SERPL-MCNC: 1.94 MG/DL (ref 1.6–2.4)
MCH RBC QN AUTO: 31.2 PG (ref 26–34)
MCHC RBC AUTO-ENTMCNC: 34.9 G/DL (ref 32–36)
MCV RBC AUTO: 90 FL (ref 80–100)
MONOCYTES # BLD AUTO: 0.73 X10*3/UL (ref 0.1–1)
MONOCYTES NFR BLD AUTO: 9.2 %
NEUTROPHILS # BLD AUTO: 3.35 X10*3/UL (ref 1.2–7.7)
NEUTROPHILS NFR BLD AUTO: 42 %
NON HDL CHOLESTEROL: 93 MG/DL (ref 0–149)
NRBC BLD-RTO: 0 /100 WBCS (ref 0–0)
PLATELET # BLD AUTO: 303 X10*3/UL (ref 150–450)
POTASSIUM SERPL-SCNC: 4.5 MMOL/L (ref 3.5–5.3)
PROT SERPL-MCNC: 7.3 G/DL (ref 6.4–8.2)
RBC # BLD AUTO: 4.65 X10*6/UL (ref 4.5–5.9)
SODIUM SERPL-SCNC: 135 MMOL/L (ref 136–145)
T4 FREE SERPL-MCNC: 1.05 NG/DL (ref 0.78–1.48)
TRIGL SERPL-MCNC: 77 MG/DL (ref 0–149)
TSH SERPL-ACNC: 2.24 MIU/L (ref 0.44–3.98)
VLDL: 15 MG/DL (ref 0–40)
WBC # BLD AUTO: 8 X10*3/UL (ref 4.4–11.3)

## 2024-11-26 PROCEDURE — 84443 ASSAY THYROID STIM HORMONE: CPT

## 2024-11-26 PROCEDURE — 80053 COMPREHEN METABOLIC PANEL: CPT

## 2024-11-26 PROCEDURE — 85025 COMPLETE CBC W/AUTO DIFF WBC: CPT

## 2024-11-26 PROCEDURE — 84439 ASSAY OF FREE THYROXINE: CPT

## 2024-11-26 PROCEDURE — 80061 LIPID PANEL: CPT

## 2024-11-26 PROCEDURE — 36415 COLL VENOUS BLD VENIPUNCTURE: CPT

## 2024-11-26 PROCEDURE — 83735 ASSAY OF MAGNESIUM: CPT

## 2024-12-11 ENCOUNTER — APPOINTMENT (OUTPATIENT)
Dept: CARDIOLOGY | Facility: CLINIC | Age: 69
End: 2024-12-11
Payer: MEDICARE

## 2024-12-11 VITALS
WEIGHT: 215 LBS | DIASTOLIC BLOOD PRESSURE: 62 MMHG | HEIGHT: 67 IN | BODY MASS INDEX: 33.74 KG/M2 | SYSTOLIC BLOOD PRESSURE: 106 MMHG | HEART RATE: 56 BPM

## 2024-12-11 DIAGNOSIS — E78.49 OTHER HYPERLIPIDEMIA: ICD-10-CM

## 2024-12-11 DIAGNOSIS — R07.89 OTHER CHEST PAIN: ICD-10-CM

## 2024-12-11 PROCEDURE — 1123F ACP DISCUSS/DSCN MKR DOCD: CPT | Performed by: INTERNAL MEDICINE

## 2024-12-11 PROCEDURE — G2211 COMPLEX E/M VISIT ADD ON: HCPCS | Performed by: INTERNAL MEDICINE

## 2024-12-11 PROCEDURE — 99204 OFFICE O/P NEW MOD 45 MIN: CPT | Performed by: INTERNAL MEDICINE

## 2024-12-11 PROCEDURE — 1036F TOBACCO NON-USER: CPT | Performed by: INTERNAL MEDICINE

## 2024-12-11 PROCEDURE — 1160F RVW MEDS BY RX/DR IN RCRD: CPT | Performed by: INTERNAL MEDICINE

## 2024-12-11 PROCEDURE — 93000 ELECTROCARDIOGRAM COMPLETE: CPT | Performed by: INTERNAL MEDICINE

## 2024-12-11 PROCEDURE — 1159F MED LIST DOCD IN RCRD: CPT | Performed by: INTERNAL MEDICINE

## 2024-12-11 PROCEDURE — 3008F BODY MASS INDEX DOCD: CPT | Performed by: INTERNAL MEDICINE

## 2024-12-11 RX ORDER — CALCIUM CARBONATE/VITAMIN D3 500-10/5ML
1 LIQUID (ML) ORAL DAILY
COMMUNITY

## 2024-12-11 NOTE — PATIENT INSTRUCTIONS
"It was my pleasure to meet you.  I look forward to being your cardiologist.  I am a huge believer in communicating with my patients.  Please contact me at any time, if anything is not clear to you regarding anything we have discussed, or if new questions occur to you.     You should increase your intake of fresh fruits and vegetables.  Try to consume 9-12 servings per day of such foods.  You should increase your intake of deep sea fish such as salmon and tuna.  Try to get two servings per week of fish, but if you are a pregnant woman, talk to your obstetrician before increasing your fish intake.  You should increase your intake of unprocessed nuts such as walnuts or almonds.  Increase your intake of plant-based protein.  You should avoid fried foods.  Don't consume sugary or starchy foods and sugary drinks.  Avoid saturated fats.  Try not to dine at restaurants more than once per month, and don't dine at fast food places.  Try to get 7-9 hours of sleep every night.  Try to get 150 minutes per week of moderate intensity exercise (after I have cleared you to start an exercise program).  Try to maintain the appropriate weight for your height based on body mass index (BMI). Maintain your cholesterol, blood sugar, and blood pressure in the recommended respective normal ranges.  There is a wealth of information on the American Heart Association's website regarding this.  Just Google \"Life's Essential 8\" for more information.   Ask me about any of these details  if you have questions.    As your cardiologist, I will be available to you at any time to answer any question you have concerning your heart health.  My staff, Colette can also answer any questions you may have.  Best of luck.       It is important for us to have an accurate list of the medications, supplements, and their doses.  It is also important for us to have an accurate list of your allergies.  Please bring this information to every appointment.  " This is a vital part of the quality of care you receive through all of your providers.

## 2024-12-11 NOTE — PROGRESS NOTES
Referred by Dr. Ybarra for Please see below.     History Of Present Illness:    Adrian Rosa is a 68 y.o. male presenting with chest discomfort.    I am seeing this 68-year-old hyperlipidemic man with a BMI of 33.7 and a family history of CAD (father and brother sustained MIs in their 60s) at the request of Dr. Ybarra for evaluation of an episode of chest discomfort.    The patient reports that he experienced an episode of chest discomfort that occurred about two weeks ago.  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   The discomfort lasted for about ten minutes.  The pain was linearly distributed from his sternal notch to his umbilucus.  There is no radiation of discomfort into his neck, jaw, arms, or elsewhere.  He did not feel diaphoretic.  He has not had any recurrences of this feeling.  The patient denies dyspnea, palpitations, orthopnea, PND, syncope, and near syncope.  He had no associated nausea or a sour taste in his mouth.      PMH: Breathitt's disease 1995, sees endocrinology      Past Medical History:  He has a past medical history of Encounter for issue of repeat prescription (12/03/2020), Hypertension, Personal history of other endocrine, nutritional and metabolic disease (11/30/2020), and Prediabetes.    Past Surgical History:  He has a past surgical history that includes Other surgical history (08/15/2022).      Social History:  He reports that he quit smoking about 49 years ago. His smoking use included cigarettes. He has never used smokeless tobacco. He reports current alcohol use. He reports that he does not use drugs.    Family History:  Family History   Problem Relation Name Age of Onset    Ovarian cancer Mother      Peripheral vascular disease Mother      COPD Mother      Lung cancer Mother      Heart attack Mother      COPD Father      Heart failure Father      Heart attack Father      Heart attack Brother          Allergies:  Erythromycin base, Erythromycin,  "Penicillins, and Streptomycin    Outpatient Medications:  Current Outpatient Medications   Medication Instructions    atorvastatin (LIPITOR) 40 mg, oral, Daily    levothyroxine (SYNTHROID, LEVOXYL) 50 mcg, oral, Daily before breakfast    lidocaine (Lidoderm) 5 % patch 1 patch, transdermal, Daily, Place patch for 12 hours, then remove for 12 hours.    magnesium oxide 400 mg magnesium capsule 1 capsule, Daily    predniSONE (DELTASONE) 5 mg, oral, Daily        Last Recorded Vitals:  Vitals:    12/11/24 1300   BP: 106/62   BP Location: Left arm   Patient Position: Sitting   Weight: 97.5 kg (215 lb)   Height: 1.702 m (5' 7\")       Physical Exam:  GENERAL:  pleasant 68 year-old  HEENT: No xanthelasma  NECK: Supple, no palpable adenopathy or thyromegaly  CHEST: Clear to auscultation, respiratory effort unlabored  CARDIAC: RRR, normal S1 and S2, no audible murmur, rub, gallop, carotids are brisk, PMI is not displaced  ABD: Active bowel sounds, nontender, no organomegaly, no evidence of ascites  EXT: No clubbing, cyanosis, edema, or tenderness  NEURO: Awake, alert, appropriate, speech is fluent         Last Labs:  CBC -  Lab Results   Component Value Date    WBC 8.0 11/26/2024    HGB 14.5 11/26/2024    HCT 41.6 11/26/2024    MCV 90 11/26/2024     11/26/2024       CMP -  Lab Results   Component Value Date    CALCIUM 8.9 11/26/2024    PHOS 3.4 09/04/2023    PROT 7.3 11/26/2024    ALBUMIN 3.8 11/26/2024    AST 23 11/26/2024    ALT 22 11/26/2024    ALKPHOS 78 11/26/2024    BILITOT 0.5 11/26/2024       LIPID PANEL -   Lab Results   Component Value Date    CHOL 129 11/26/2024    TRIG 77 11/26/2024    HDL 36.5 11/26/2024    CHHDL 3.5 11/26/2024    LDLF 71 06/07/2023    VLDL 15 11/26/2024    NHDL 93 11/26/2024       RENAL FUNCTION PANEL -   Lab Results   Component Value Date    GLUCOSE 85 11/26/2024     (L) 11/26/2024    K 4.5 11/26/2024     11/26/2024    CO2 25 11/26/2024    ANIONGAP 12 11/26/2024    BUN 17 " 11/26/2024    CREATININE 1.23 11/26/2024    GFRMALE 53 (A) 09/12/2023    CALCIUM 8.9 11/26/2024    PHOS 3.4 09/04/2023    ALBUMIN 3.8 11/26/2024        Lab Results   Component Value Date    HGBA1C 5.8 05/09/2024         Lab review: I have Chemistry CMP:   Lab Results   Component Value Date    ALBUMIN 3.8 11/26/2024    CALCIUM 8.9 11/26/2024    CO2 25 11/26/2024    CREATININE 1.23 11/26/2024    GLUCOSE 85 11/26/2024    BILITOT 0.5 11/26/2024    PROT 7.3 11/26/2024    ALT 22 11/26/2024    AST 23 11/26/2024    ALKPHOS 78 11/26/2024   , Chemistry BMP   Lab Results   Component Value Date    GLUCOSE 85 11/26/2024    CALCIUM 8.9 11/26/2024    CO2 25 11/26/2024    CREATININE 1.23 11/26/2024   , CBC:  Lab Results   Component Value Date    WBC 8.0 11/26/2024    RBC 4.65 11/26/2024    HGB 14.5 11/26/2024    HCT 41.6 11/26/2024    MCV 90 11/26/2024    MCH 31.2 11/26/2024    MCHC 34.9 11/26/2024    RDW 12.2 11/26/2024    NRBC 0.0 11/26/2024   , and Lipids:   Lab Results   Component Value Date    CHOL 129 11/26/2024    HDL 36.5 11/26/2024    LDLCALC 77 11/26/2024    TRIG 77 11/26/2024       Assessment/Plan   Assessment & Plan  Other chest pain    Orders:    Referral to Cardiology    ECG 12 lead (Clinic Performed)    Stress Test; Future    Follow Up In Cardiology; Future    BMI 33.0-33.9,adult    Orders:    Follow Up In Cardiology; Future    Other hyperlipidemia    Orders:    Follow Up In Cardiology; Future    This 68-year-old hyperlipidemic man with a BMI of 33.7 and a family history of heart disease had a single episode of chest pain as described that occurred approximately 2 weeks ago with no recurrence.  Our approach:    1.  Stress test  2.  Risk factor modification.  Educational materials provided.        Ismael Dumont MD

## 2025-01-03 ENCOUNTER — HOSPITAL ENCOUNTER (OUTPATIENT)
Dept: CARDIOLOGY | Facility: HOSPITAL | Age: 70
Discharge: HOME | End: 2025-01-03
Payer: MEDICARE

## 2025-01-03 DIAGNOSIS — R07.89 OTHER CHEST PAIN: ICD-10-CM

## 2025-01-03 PROCEDURE — 93016 CV STRESS TEST SUPVJ ONLY: CPT | Performed by: INTERNAL MEDICINE

## 2025-01-03 PROCEDURE — 93018 CV STRESS TEST I&R ONLY: CPT | Performed by: INTERNAL MEDICINE

## 2025-01-03 PROCEDURE — 93017 CV STRESS TEST TRACING ONLY: CPT

## 2025-01-13 ENCOUNTER — APPOINTMENT (OUTPATIENT)
Dept: CARDIOLOGY | Facility: CLINIC | Age: 70
End: 2025-01-13
Payer: MEDICARE

## 2025-01-13 VITALS
BODY MASS INDEX: 34.21 KG/M2 | DIASTOLIC BLOOD PRESSURE: 66 MMHG | HEART RATE: 64 BPM | WEIGHT: 218 LBS | HEIGHT: 67 IN | SYSTOLIC BLOOD PRESSURE: 130 MMHG

## 2025-01-13 DIAGNOSIS — R07.89 OTHER CHEST PAIN: ICD-10-CM

## 2025-01-13 DIAGNOSIS — E78.49 OTHER HYPERLIPIDEMIA: ICD-10-CM

## 2025-01-13 PROCEDURE — 3008F BODY MASS INDEX DOCD: CPT | Performed by: INTERNAL MEDICINE

## 2025-01-13 PROCEDURE — 1036F TOBACCO NON-USER: CPT | Performed by: INTERNAL MEDICINE

## 2025-01-13 PROCEDURE — 1160F RVW MEDS BY RX/DR IN RCRD: CPT | Performed by: INTERNAL MEDICINE

## 2025-01-13 PROCEDURE — 99213 OFFICE O/P EST LOW 20 MIN: CPT | Performed by: INTERNAL MEDICINE

## 2025-01-13 PROCEDURE — 1123F ACP DISCUSS/DSCN MKR DOCD: CPT | Performed by: INTERNAL MEDICINE

## 2025-01-13 PROCEDURE — 1159F MED LIST DOCD IN RCRD: CPT | Performed by: INTERNAL MEDICINE

## 2025-01-13 PROCEDURE — G2211 COMPLEX E/M VISIT ADD ON: HCPCS | Performed by: INTERNAL MEDICINE

## 2025-01-13 NOTE — PROGRESS NOTES
"Chief Complaint:   Please see below.     History Of Present Illness:    Adrian Rosa is a 69 y.o. male presenting with chest discomfort.    The patient denies chest discomfort, dyspnea, palpitations, orthopnea, PND, syncope, and near syncope.    The patient's stress test on 1/3/2025 disclosed no ischemia at 6.9 METS.           Last Recorded Vitals:  Vitals:    01/13/25 1100   BP: 130/66   BP Location: Left arm   Patient Position: Sitting   Pulse: 64   Weight: 98.9 kg (218 lb)   Height: 1.702 m (5' 7\")       Past Medical History:  He has a past medical history of Encounter for issue of repeat prescription (12/03/2020), Hypertension, Personal history of other endocrine, nutritional and metabolic disease (11/30/2020), and Prediabetes.    Past Surgical History:  He has a past surgical history that includes Other surgical history (08/15/2022).      Social History:  He reports that he quit smoking about 50 years ago. His smoking use included cigarettes. He has never used smokeless tobacco. He reports current alcohol use. He reports that he does not use drugs.    Family History:  Family History   Problem Relation Name Age of Onset    Ovarian cancer Mother      Peripheral vascular disease Mother      COPD Mother      Lung cancer Mother      Heart attack Mother      COPD Father      Heart failure Father      Heart attack Father      Heart attack Brother          Allergies:  Erythromycin base, Erythromycin, Penicillins, and Streptomycin    Outpatient Medications:  Current Outpatient Medications   Medication Instructions    atorvastatin (LIPITOR) 40 mg, oral, Daily    levothyroxine (SYNTHROID, LEVOXYL) 50 mcg, oral, Daily before breakfast    lidocaine (Lidoderm) 5 % patch 1 patch, transdermal, Daily, Place patch for 12 hours, then remove for 12 hours.    magnesium oxide 400 mg magnesium capsule 1 capsule, Daily    predniSONE (DELTASONE) 5 mg, oral, Daily       Physical Exam:  CHEST: Clear to auscultation  CARDIAC: Regular " rhythm and rate, normal S1 and S2, no murmur rub or gallop; carotids are brisk without bruits, PMI is not displaced  ABDOMEN: Active bowel sounds, no tenderness, no evidence of ascites  EXTREMITIES: No clubbing, cyanosis, edema, or tenderness       Last Labs:  CBC -  Lab Results   Component Value Date    WBC 8.0 11/26/2024    HGB 14.5 11/26/2024    HCT 41.6 11/26/2024    MCV 90 11/26/2024     11/26/2024       CMP -  Lab Results   Component Value Date    CALCIUM 8.9 11/26/2024    PHOS 3.4 09/04/2023    PROT 7.3 11/26/2024    ALBUMIN 3.8 11/26/2024    AST 23 11/26/2024    ALT 22 11/26/2024    ALKPHOS 78 11/26/2024    BILITOT 0.5 11/26/2024       LIPID PANEL -   Lab Results   Component Value Date    CHOL 129 11/26/2024    TRIG 77 11/26/2024    HDL 36.5 11/26/2024    CHHDL 3.5 11/26/2024    LDLF 71 06/07/2023    VLDL 15 11/26/2024    NHDL 93 11/26/2024       RENAL FUNCTION PANEL -   Lab Results   Component Value Date    GLUCOSE 85 11/26/2024     (L) 11/26/2024    K 4.5 11/26/2024     11/26/2024    CO2 25 11/26/2024    ANIONGAP 12 11/26/2024    BUN 17 11/26/2024    CREATININE 1.23 11/26/2024    GFRMALE 53 (A) 09/12/2023    CALCIUM 8.9 11/26/2024    PHOS 3.4 09/04/2023    ALBUMIN 3.8 11/26/2024        Lab Results   Component Value Date    HGBA1C 5.8 05/09/2024         Diagnostic review: I have independently interpreted the stress test .  My findings are as summarized in the report..    Assessment/Plan   Assessment & Plan  Other chest pain    Orders:    Follow Up In Cardiology    BMI 33.0-33.9,adult    Orders:    Follow Up In Cardiology    Other hyperlipidemia    Orders:    Follow Up In Cardiology          Ismael Dumont MD

## 2025-01-13 NOTE — PATIENT INSTRUCTIONS

## 2025-02-19 ENCOUNTER — APPOINTMENT (OUTPATIENT)
Dept: PRIMARY CARE | Facility: CLINIC | Age: 70
End: 2025-02-19
Payer: MEDICARE

## 2025-02-19 VITALS
BODY MASS INDEX: 33.9 KG/M2 | OXYGEN SATURATION: 96 % | HEART RATE: 57 BPM | HEIGHT: 67 IN | DIASTOLIC BLOOD PRESSURE: 72 MMHG | WEIGHT: 216 LBS | RESPIRATION RATE: 18 BRPM | SYSTOLIC BLOOD PRESSURE: 123 MMHG | TEMPERATURE: 97 F

## 2025-02-19 DIAGNOSIS — E27.1 ADDISON'S DISEASE (MULTI): ICD-10-CM

## 2025-02-19 DIAGNOSIS — S39.012D STRAIN OF LUMBAR REGION, SUBSEQUENT ENCOUNTER: ICD-10-CM

## 2025-02-19 DIAGNOSIS — Z12.5 SCREENING FOR PROSTATE CANCER: ICD-10-CM

## 2025-02-19 DIAGNOSIS — Z00.00 ROUTINE GENERAL MEDICAL EXAMINATION AT HEALTH CARE FACILITY: Primary | ICD-10-CM

## 2025-02-19 DIAGNOSIS — Z13.220 SCREENING FOR HYPERLIPIDEMIA: ICD-10-CM

## 2025-02-19 DIAGNOSIS — S16.1XXD STRAIN OF NECK MUSCLE, SUBSEQUENT ENCOUNTER: ICD-10-CM

## 2025-02-19 DIAGNOSIS — R73.03 PREDIABETES: ICD-10-CM

## 2025-02-19 DIAGNOSIS — E03.8 SUBCLINICAL HYPOTHYROIDISM: ICD-10-CM

## 2025-02-19 DIAGNOSIS — E78.49 OTHER HYPERLIPIDEMIA: ICD-10-CM

## 2025-02-19 PROBLEM — K80.20 CHOLELITHIASIS: Status: RESOLVED | Noted: 2023-01-23 | Resolved: 2025-02-19

## 2025-02-19 PROBLEM — S16.1XXA CERVICAL STRAIN: Status: ACTIVE | Noted: 2025-02-19

## 2025-02-19 PROCEDURE — 1170F FXNL STATUS ASSESSED: CPT | Performed by: FAMILY MEDICINE

## 2025-02-19 PROCEDURE — 1160F RVW MEDS BY RX/DR IN RCRD: CPT | Performed by: FAMILY MEDICINE

## 2025-02-19 PROCEDURE — G0439 PPPS, SUBSEQ VISIT: HCPCS | Performed by: FAMILY MEDICINE

## 2025-02-19 PROCEDURE — 1036F TOBACCO NON-USER: CPT | Performed by: FAMILY MEDICINE

## 2025-02-19 PROCEDURE — 1159F MED LIST DOCD IN RCRD: CPT | Performed by: FAMILY MEDICINE

## 2025-02-19 PROCEDURE — 99214 OFFICE O/P EST MOD 30 MIN: CPT | Performed by: FAMILY MEDICINE

## 2025-02-19 PROCEDURE — 3008F BODY MASS INDEX DOCD: CPT | Performed by: FAMILY MEDICINE

## 2025-02-19 PROCEDURE — 1124F ACP DISCUSS-NO DSCNMKR DOCD: CPT | Performed by: FAMILY MEDICINE

## 2025-02-19 ASSESSMENT — PATIENT HEALTH QUESTIONNAIRE - PHQ9
2. FEELING DOWN, DEPRESSED OR HOPELESS: NOT AT ALL
1. LITTLE INTEREST OR PLEASURE IN DOING THINGS: NOT AT ALL
SUM OF ALL RESPONSES TO PHQ9 QUESTIONS 1 AND 2: 0

## 2025-02-19 ASSESSMENT — ENCOUNTER SYMPTOMS
DEPRESSION: 0
NECK PAIN: 1
FREQUENCY: 1
FATIGUE: 0
OCCASIONAL FEELINGS OF UNSTEADINESS: 0
CHOKING: 0
LOSS OF SENSATION IN FEET: 0
BLOOD IN STOOL: 0
BACK PAIN: 1
SHORTNESS OF BREATH: 0
TROUBLE SWALLOWING: 0

## 2025-02-19 ASSESSMENT — ACTIVITIES OF DAILY LIVING (ADL)
BATHING: INDEPENDENT
TAKING_MEDICATION: INDEPENDENT
GROCERY_SHOPPING: INDEPENDENT
MANAGING_FINANCES: INDEPENDENT
DOING_HOUSEWORK: INDEPENDENT
DRESSING: INDEPENDENT

## 2025-02-19 NOTE — ASSESSMENT & PLAN NOTE
Lets do x-ray of the neck can he had muscle relaxant if desires however only use periodically and PT referral placed in chart

## 2025-02-19 NOTE — PROGRESS NOTES
Celine Rosa is a 69 y.o. male who is here for a routine exam/Medicare Annual    Active Problem List      Comprehensive Medical/Surgical/Social/Family History  Past Medical History:   Diagnosis Date    Encounter for issue of repeat prescription 2020    Encounter for medication refill    Hypertension     Personal history of other endocrine, nutritional and metabolic disease 2020    History of Jama's disease    Prediabetes      Past Surgical History:   Procedure Laterality Date    OTHER SURGICAL HISTORY  08/15/2022    Cholecystectomy laparoscopic     Social History     Social History Narrative    Not on file     Social History     Socioeconomic History    Marital status: Single     Spouse name: Not on file    Number of children: Not on file    Years of education: Not on file    Highest education level: Not on file   Occupational History    Not on file   Tobacco Use    Smoking status: Former     Current packs/day: 0.00     Types: Cigarettes     Quit date:      Years since quittin.1    Smokeless tobacco: Never   Vaping Use    Vaping status: Never Used   Substance and Sexual Activity    Alcohol use: Yes     Comment: occasional / once a week to once a month.    Drug use: Never    Sexual activity: Defer   Other Topics Concern    Not on file   Social History Narrative    Not on file     Social Drivers of Health     Financial Resource Strain: Not on file   Food Insecurity: Not on file   Transportation Needs: Not on file   Physical Activity: Not on file   Stress: Not on file   Social Connections: Not on file   Intimate Partner Violence: Not on file   Housing Stability: Not on file      Family History   Problem Relation Name Age of Onset    Ovarian cancer Mother      Peripheral vascular disease Mother      COPD Mother      Lung cancer Mother      Heart attack Mother      COPD Father      Heart failure Father      Heart attack Father      Heart attack Brother          Allergies and  Medications  Erythromycin base, Erythromycin, Penicillins, and Streptomycin  Current Outpatient Medications on File Prior to Visit   Medication Sig Dispense Refill    atorvastatin (Lipitor) 40 mg tablet Take 1 tablet (40 mg) by mouth once daily. 90 tablet 3    levothyroxine (Synthroid, Levoxyl) 50 mcg tablet Take 1 tablet (50 mcg) by mouth once daily in the morning. Take before meals. 90 tablet 3    predniSONE (Deltasone) 5 mg tablet Take 1 tablet (5 mg) by mouth once daily. 90 tablet 3    lidocaine (Lidoderm) 5 % patch Place 1 patch over 12 hours on the skin once daily. Place patch for 12 hours, then remove for 12 hours. 10 patch 0    [DISCONTINUED] magnesium oxide 400 mg magnesium capsule Take 1 capsule (400 mg) by mouth once daily.       No current facility-administered medications on file prior to visit.       Concerns today:  Back  pain         and  neck  pain    off  and  on for few  years  ..denies  numbness  tingling down   legs   ..     No  incontinence of    urine     or stool  Patient is also here for follow-up of hypertension.. Symptoms do not include headache confusion visual disturbance dizziness shortness of breath chest pain syncope or palpitations. Recent blood pressure patient has not been checking. By report there is good compliance with treatment. Pertinent medical history includes  hyperlipidemia .    Patient is also here for follow-up of hyperlipidemia. The most recent measurements for this patient would take it on.. 11/24  .. See  below     .... Associated symptoms do not include chest pain, Cramps with exertion, myalgias or nausea. Current treatment includes statins. By report there is good compliance with treatment. Pertinent medical history includes    hypertension     HERE FOR RECHK OF THYROIDLast clinic visit was month (S) ago.Thyroid testing dated 11/24 showed TSH 2.24 with free thyroxine 1.05 and Symptoms do not include weight gain, cold intolerance, fatigue, weakness, appetite, hair  "loss, dry skin    There is no known event that preceded symptom onset.  . Current treatment includes levothyroxine. Report there is good compliance with medical treatment.   Lifestyle  Diet: Could be improved  Physical Activity: Not on file      Tobacco Use: Medium Risk (2/19/2025)    Patient History     Smoking Tobacco Use: Former     Smokeless Tobacco Use: Never     Passive Exposure: Not on file      Alcohol Use: Not on file      Depression: Not at risk (2/19/2025)    PHQ-2     PHQ-2 Score: 0      Stress: Not on file         Consultants:  Patient Care Team:  Nikunj Ybarra DO as PCP - General  Nikunj Ybarra DO as PCP - [employer] PCP    endocrinology Dr. Raymond Jaramillo  Colorectal Screening:   colonoscopy  Date: 1/3/23     Nocturia: .yes   Erectile dysfunction: .Did not discuss     Review of Systems   Constitutional:  Negative for fatigue.   HENT:  Negative for trouble swallowing.    Respiratory:  Negative for choking and shortness of breath.    Cardiovascular:  Negative for chest pain.   Gastrointestinal:  Negative for blood in stool.   Genitourinary:  Positive for frequency.   Musculoskeletal:  Positive for back pain and neck pain.       Objective   /72   Pulse 57   Temp 36.1 °C (97 °F)   Resp 18   Ht 1.702 m (5' 7\")   Wt 98 kg (216 lb)   SpO2 96%   BMI 33.83 kg/m²     Physical Exam  Vitals: I have reviewed the vitals  General: Well-developed.  In no acute distress.  Eyes:   sclera nonicteric.  Conjunctiva not injected.  No discharge.   HEAD: Normocephalic, atraumatic.  HEENT   Mucous membranes moist.  Posterior oropharynx nonerythematous, no tonsillar exudates.      No cervical lymphadenopathy.  Cardio: Regular rate and rhythm.  No murmur, rub or gallop.  Pulmonary: Lungs clear to auscultation in all fields.  No accessory muscle use.  GI/: Normal active bowel sounds.  Soft, nontender.  No masses or organomegaly appreciated.  Musculoskeletal: No gross deformities " appreciated.  Neuro: Alert, age-appropriate.  Normal muscle tone.  Moving all extremities.  Skin: No rash, bruises or lesions.   Assessment/Plan   Problem List Items Addressed This Visit       Hyperlipidemia     Patient is also here for follow-up of hyperlipidemia. The most recent measurements for this patient would take it on.. 11/24  .. See  below     .... Associated symptoms do not include chest pain, Cramps with exertion, myalgias or nausea. Current treatment includes statins. By report there is good compliance with treatment. Pertinent medical history includes    hypertension  stable and continue meds          Prediabetes     No data recorded wrap up           Subclinical hypothyroidism     HERE FOR RECHK OF THYROIDLast clinic visit was month (S) ago.Thyroid testing dated 11/24 showed TSH 2.24 with free thyroxine 1.05 and Symptoms do not include weight gain, cold intolerance, fatigue, weakness, appetite, hair loss, dry skin    There is no known event that preceded symptom onset.  . Current treatment includes levothyroxine. Report there is good compliance with medical treatment.   stable and continue meds          Lumbar strain     Lets consider PT I will give you back exercises size handout try core strengthening and please get x-ray         Relevant Orders    XR lumbar spine 2-3 views    Referral to Physical Therapy    Cervical strain     Lets do x-ray of the neck can he had muscle relaxant if desires however only use periodically and PT referral placed in chart         Relevant Orders    XR cervical spine 2-3 views     Other Visit Diagnoses       Routine general medical examination at health care facility    -  Primary    Relevant Orders    1 Year Follow Up In [facility] - PCP - Wellness Exam    Comprehensive Metabolic Panel    Screening for hyperlipidemia        Relevant Orders    Lipid Panel    Screening for prostate cancer        Relevant Orders    PSA screen          Reviewed Social  Determinants of health with patient, discussed healthy lifestyle including 150 minutes of physical activity per week  Ordered/Reviewed baseline labwork -CBC, CMP, Lipid Panel  Immunizations: Up To Date  Colonoscopy 2023    Health Counseling     MEDICARE SUMMARY   Colorectal cancer: Screening  adults aged 50-75... In light of your last colonoscopy being 2023.  rePete... 2028. Please follow the recommendations of the gastroenterologist.    HIV infection: Screening:  Those adults at risk H 15-65 years  Not indicated   High blood pressure: Screening  and home monitoring... Adults with history and at risk   Monitor your blood pressure at home and notify if blood pressure consistently over 140 systolic 90 diastolic   Diabetes mellitus (type II (and abnormal blood glucose: Screening--adults H 40-70 years who are overweight or obese..  A1c today at 5.8 and encouraged  Falls prevention in community swelling older adults:.. Interventions--- adults 65 or older get grab bars and also handrail and encouraged safety   Healthful diet and physical activity for C VD disease prevention: Counseling--adults with CVD risk factors  Hepatitis C virus infection: Screening--adults at high risk and adults born between 1945 and 1965 not indicated  Abdominal aortic aneurysm: Screening  men ages 65-75 years who have ever    smoked previously screening..  With CT scan negative for aneurysm  Aspirin use to prevent CVD and CRC:  Preventitive medication --adults aged 50-59 years with a greater than or equal to 10% 10 year CVD risk  Lung cancer: Screening --adults ages 55-80 with a 30 pack year smoking history and currently smoke or have quit within the past 15 years not indicated  Statin use for the primary prevention of CVD: Preventative medicine--adults  40-75 years with no history of CVD, one or more CVD risk factors, and a calculated 10 year CVD event risk of 10% or greater..  On statin and will repeat to confirm stability lipids dated 11/24  showed cholesterol 129 LDL 77 with HDL 36 triglycerides 77 and excellent and encouraged to continue meds with liver testing all within normal limits with sodium 135 GFR normal  IMMUNIZATION  UPDATES .  P20 previously given

## 2025-02-19 NOTE — ASSESSMENT & PLAN NOTE
Patient is also here for follow-up of hyperlipidemia. The most recent measurements for this patient would take it on.. 11/24  .. See  below     .... Associated symptoms do not include chest pain, Cramps with exertion, myalgias or nausea. Current treatment includes statins. By report there is good compliance with treatment. Pertinent medical history includes    hypertension  stable and continue meds

## 2025-02-19 NOTE — ASSESSMENT & PLAN NOTE
HERE FOR RECHK OF THYROIDLast clinic visit was month (S) ago.Thyroid testing dated 11/24 showed TSH 2.24 with free thyroxine 1.05 and Symptoms do not include weight gain, cold intolerance, fatigue, weakness, appetite, hair loss, dry skin    There is no known event that preceded symptom onset.  . Current treatment includes levothyroxine. Report there is good compliance with medical treatment.   stable and continue meds

## 2025-02-19 NOTE — ASSESSMENT & PLAN NOTE
Lets consider PT I will give you back exercises size handout try core strengthening and please get x-ray

## 2025-02-20 NOTE — ASSESSMENT & PLAN NOTE
Will be seeing new endocrinologist.  Per patient advised to continue prednisone problems he will call

## 2025-05-09 ENCOUNTER — HOSPITAL ENCOUNTER (OUTPATIENT)
Dept: RADIOLOGY | Facility: CLINIC | Age: 70
Discharge: HOME | End: 2025-05-09
Payer: MEDICARE

## 2025-05-09 DIAGNOSIS — S39.012D STRAIN OF LUMBAR REGION, SUBSEQUENT ENCOUNTER: ICD-10-CM

## 2025-05-09 LAB
ALBUMIN SERPL-MCNC: 3.9 G/DL (ref 3.6–5.1)
ALP SERPL-CCNC: 70 U/L (ref 35–144)
ALT SERPL-CCNC: 12 U/L (ref 9–46)
ANION GAP SERPL CALCULATED.4IONS-SCNC: 7 MMOL/L (CALC) (ref 7–17)
AST SERPL-CCNC: 17 U/L (ref 10–35)
BILIRUB SERPL-MCNC: 0.9 MG/DL (ref 0.2–1.2)
BUN SERPL-MCNC: 19 MG/DL (ref 7–25)
CALCIUM SERPL-MCNC: 8.9 MG/DL (ref 8.6–10.3)
CHLORIDE SERPL-SCNC: 102 MMOL/L (ref 98–110)
CHOLEST SERPL-MCNC: 147 MG/DL
CHOLEST/HDLC SERPL: 4.1 (CALC)
CO2 SERPL-SCNC: 26 MMOL/L (ref 20–32)
CREAT SERPL-MCNC: 1.19 MG/DL (ref 0.7–1.28)
EGFRCR SERPLBLD CKD-EPI 2021: 66 ML/MIN/1.73M2
GLUCOSE SERPL-MCNC: 79 MG/DL (ref 65–99)
HDLC SERPL-MCNC: 36 MG/DL
LDLC SERPL CALC-MCNC: 90 MG/DL (CALC)
NONHDLC SERPL-MCNC: 111 MG/DL (CALC)
POTASSIUM SERPL-SCNC: 4.4 MMOL/L (ref 3.5–5.3)
PROT SERPL-MCNC: 7.3 G/DL (ref 6.1–8.1)
PSA SERPL-MCNC: 0.75 NG/ML
SODIUM SERPL-SCNC: 135 MMOL/L (ref 135–146)
TRIGL SERPL-MCNC: 110 MG/DL

## 2025-05-09 PROCEDURE — 72100 X-RAY EXAM L-S SPINE 2/3 VWS: CPT

## 2025-05-28 ENCOUNTER — APPOINTMENT (OUTPATIENT)
Dept: ENDOCRINOLOGY | Facility: CLINIC | Age: 70
End: 2025-05-28
Payer: MEDICARE

## 2025-06-15 DIAGNOSIS — E78.5 HYPERLIPIDEMIA, UNSPECIFIED HYPERLIPIDEMIA TYPE: ICD-10-CM

## 2025-06-16 RX ORDER — ATORVASTATIN CALCIUM 40 MG/1
40 TABLET, FILM COATED ORAL DAILY
Qty: 90 TABLET | Refills: 1 | Status: SHIPPED | OUTPATIENT
Start: 2025-06-16

## 2025-06-19 ENCOUNTER — APPOINTMENT (OUTPATIENT)
Dept: PRIMARY CARE | Facility: CLINIC | Age: 70
End: 2025-06-19
Payer: MEDICARE

## 2025-06-19 VITALS
BODY MASS INDEX: 34.53 KG/M2 | OXYGEN SATURATION: 97 % | WEIGHT: 220 LBS | HEIGHT: 67 IN | SYSTOLIC BLOOD PRESSURE: 120 MMHG | DIASTOLIC BLOOD PRESSURE: 80 MMHG | HEART RATE: 64 BPM | TEMPERATURE: 97 F | RESPIRATION RATE: 18 BRPM

## 2025-06-19 DIAGNOSIS — M48.061 SPINAL STENOSIS OF LUMBAR REGION WITHOUT NEUROGENIC CLAUDICATION: ICD-10-CM

## 2025-06-19 DIAGNOSIS — L57.0 ACTINIC KERATOSIS: ICD-10-CM

## 2025-06-19 DIAGNOSIS — E27.1 ADDISON'S DISEASE (MULTI): ICD-10-CM

## 2025-06-19 DIAGNOSIS — M85.80 OSTEOPENIA, UNSPECIFIED LOCATION: ICD-10-CM

## 2025-06-19 DIAGNOSIS — K40.90 NON-RECURRENT UNILATERAL INGUINAL HERNIA WITHOUT OBSTRUCTION OR GANGRENE: ICD-10-CM

## 2025-06-19 DIAGNOSIS — Z00.00 HEALTHCARE MAINTENANCE: Primary | ICD-10-CM

## 2025-06-19 DIAGNOSIS — E03.9 HYPOTHYROIDISM, UNSPECIFIED TYPE: ICD-10-CM

## 2025-06-19 PROBLEM — N40.1 BENIGN PROSTATIC HYPERPLASIA WITH URINARY OBSTRUCTION: Status: ACTIVE | Noted: 2025-06-19

## 2025-06-19 PROBLEM — N17.9 AKI (ACUTE KIDNEY INJURY): Status: ACTIVE | Noted: 2025-06-19

## 2025-06-19 PROBLEM — N20.0 CALCULUS OF RIGHT KIDNEY: Status: ACTIVE | Noted: 2025-06-19

## 2025-06-19 PROBLEM — N13.8 BENIGN PROSTATIC HYPERPLASIA WITH URINARY OBSTRUCTION: Status: ACTIVE | Noted: 2025-06-19

## 2025-06-19 LAB
POC APPEARANCE, URINE: CLEAR
POC BILIRUBIN, URINE: NEGATIVE
POC BLOOD, URINE: NEGATIVE
POC COLOR, URINE: YELLOW
POC GLUCOSE, URINE: NEGATIVE MG/DL
POC KETONES, URINE: NEGATIVE MG/DL
POC LEUKOCYTES, URINE: NEGATIVE
POC NITRITE,URINE: NEGATIVE
POC PH, URINE: 5.5 PH
POC PROTEIN, URINE: NEGATIVE MG/DL
POC SPECIFIC GRAVITY, URINE: 1.01
POC UROBILINOGEN, URINE: 0.2 EU/DL

## 2025-06-19 PROCEDURE — 1036F TOBACCO NON-USER: CPT | Performed by: FAMILY MEDICINE

## 2025-06-19 PROCEDURE — 99214 OFFICE O/P EST MOD 30 MIN: CPT | Performed by: FAMILY MEDICINE

## 2025-06-19 PROCEDURE — 81003 URINALYSIS AUTO W/O SCOPE: CPT | Performed by: FAMILY MEDICINE

## 2025-06-19 PROCEDURE — 3008F BODY MASS INDEX DOCD: CPT | Performed by: FAMILY MEDICINE

## 2025-06-19 PROCEDURE — 1159F MED LIST DOCD IN RCRD: CPT | Performed by: FAMILY MEDICINE

## 2025-06-19 RX ORDER — LEVOTHYROXINE SODIUM 50 UG/1
50 TABLET ORAL
Qty: 90 TABLET | Refills: 3 | Status: SHIPPED | OUTPATIENT
Start: 2025-06-19

## 2025-06-19 RX ORDER — PREDNISONE 5 MG/1
5 TABLET ORAL DAILY
Qty: 90 TABLET | Refills: 3 | Status: SHIPPED | OUTPATIENT
Start: 2025-06-19

## 2025-06-19 ASSESSMENT — ENCOUNTER SYMPTOMS
HEMATURIA: 0
CONSTIPATION: 0
UNEXPECTED WEIGHT CHANGE: 0
NUMBNESS: 0
ABDOMINAL PAIN: 0
DIARRHEA: 0
ANAL BLEEDING: 0
FATIGUE: 0
RECTAL PAIN: 0
BLOOD IN STOOL: 0
FLANK PAIN: 0
BACK PAIN: 1
MYALGIAS: 1

## 2025-06-19 ASSESSMENT — PATIENT HEALTH QUESTIONNAIRE - PHQ9
SUM OF ALL RESPONSES TO PHQ9 QUESTIONS 1 AND 2: 0
2. FEELING DOWN, DEPRESSED OR HOPELESS: NOT AT ALL
1. LITTLE INTEREST OR PLEASURE IN DOING THINGS: NOT AT ALL

## 2025-06-19 NOTE — ASSESSMENT & PLAN NOTE
CT reported and noted below with findings  Stable right inguinal hernia containing fat.  Discussed with patient if pain and discomfort will give general surgery referral

## 2025-06-19 NOTE — PATIENT INSTRUCTIONS
Apply ice machine on [employer]    Use heat to the affected area as necessary for pain    Avoid heavy lifting    Wear a back brace if doing lifting i.e. weightlifting belt  In light of chronic prednisone therapy lets get bone density

## 2025-06-19 NOTE — PROGRESS NOTES
Subjective   Patient ID: Adrian Rosa is a 69 y.o. male who presents for Follow-up.  HPI  Back     pain     and walk daily     Has  pain right side  Patient has pain with sidebending to the right which he describes as identical and reviewing emergency room records dated 3/20/2024 show CT scan was ordered.  At that time he denied blood in his urine did admit to more frequent urination.  CT scan showed of abdomen pelvis liver within normal limits status postcholecystectomy fatty atrophy of pancreas spleen within normal limits adrenals within normal limits with symmetric renal enhancement mild bilateral perinephric stranding calcific arthrosclerosis of the aorta coronary vessels with no aneurysm.  There was a right inguinal hernia appreciated on exam which stable containing fat and enlarged prostate.  Review of lumbar spine x-ray dated 5/10/2025 showed moderate lumbar degenerative changes greatest in the upper lumbar spine which is.    Rash  on  face  Review of Systems   Constitutional:  Negative for fatigue and unexpected weight change.   Gastrointestinal:  Negative for abdominal pain, anal bleeding, blood in stool, constipation, diarrhea and rectal pain.   Genitourinary:  Negative for flank pain, hematuria and testicular pain.   Musculoskeletal:  Positive for back pain and myalgias.   Neurological:  Negative for numbness.       Objective   Physical Exam  general: alert oriented x three  HEENT hearing normal to voice  Neck supple  Lungs respirations non-labored.  Cardiovascular: no peripheral edema  Skin: warm and dry with  rash    right cheek  Psych: judgement and insight normal  Musculoskeletal:  ambulation normal,     Gait/Station  -normal gait  Cervical spine-normal strength and tone, normal cervical spine movements, sensation normal, no tenderness to palpation.  Thoracic spine-normal strength and tone, normal thoracic spine movements, sensation normal, no tenderness to palpation.  Lumbosacral spine-normal  strength and tone, normal deep tendon reflexes, normal lumbar spine movements, sensation normal, yes  tenderness to palpation.. right   lumbar  spine  and worsening  pain  with   side bending to right in straight leg raise is   negative on left/right .. Positve    bench test on right  lymph:negative cervical  LYMPADENOPATHY  thyroid: non palpable enlargement   Labs    Urinalysis with Reflex Culture and Microscopic  Order: [id number]   Status: Final result    Test Result Released: Yes (seen)    0 Result Notes       Specimen Collected: 03/20/24 04:38 Last Resulted: 03/20/24 05:18       Order Details        View Encounter        Lab and Collection Details        Routing        Result History     View All Conversations on this Encounter     Result Care Coordination      Patient Communication     Add Comments   Seen Back to Top     Urinalysis with Reflex Culture and Microscopic  Order: [id number] - Part of Panel Order [id number]   Status: Final result    Test Result Released: Yes (not seen)    0 Result Notes            Component  Ref Range & Units 1 yr ago  (3/20/24) 1 yr ago  (12/27/23) 1 yr ago  (9/12/23) 1 yr ago  (9/7/23) 1 yr ago  (9/3/23) 1 yr ago  (9/3/23) 1 yr ago  (9/1/23)   Color, Urine  Straw, Yellow Yellow Yellow YELLOW R YELLOW R CANCELED R, CM YELLOW R YELLOW R   Appearance, Urine  Clear Clear Clear CLEAR R HAZY R CANCELED R, CM CLEAR R CLEAR R   Specific Gravity, Urine  1.005 - 1.035 1.025 1.024 1.025 1.018 CANCELED R, CM 1.014 1.050 High    pH, Urine  5.0, 5.5, 6.0, 6.5, 7.0, 7.5, 8.0 5.0 5.0 6.5 R 5.0 R CANCELED R, CM 6.0 R 5.0 R   Protein, Urine  NEGATIVE mg/dL NEGATIVE NEGATIVE TRACE 100(2+) Abnormal  CANCELED R, CM NEGATIVE NEGATIVE   Glucose, Urine  NEGATIVE mg/dL NEGATIVE NEGATIVE NEGATIVE 50(TRACE) Abnormal  CANCELED R, CM NEGATIVE NEGATIVE   Blood, Urine  NEGATIVE NEGATIVE NEGATIVE TRACE Abnormal  LARGE(3+) Abnormal  CANCELED R, CM MODERATE(2+) Abnormal  SMALL(1+) Abnormal    Ketones, Urine  NEGATIVE  mg/dL NEGATIVE NEGATIVE NEGATIVE NEGATIVE CANCELED R, CM 20(1+) Abnormal  NEGATIVE   Bilirubin, Urine  NEGATIVE NEGATIVE NEGATIVE NEGATIVE NEGATIVE CANCELED R, CM NEGATIVE NEGATIVE   Urobilinogen, Urine  <2.0 mg/dL <2.0 <2.0 <2.0 R <2.0 R CANCELED R, CM <2.0 R <2.0 R   Nitrite, Urine  NEGATIVE NEGATIVE NEGATIVE NEGATIVE NEGATIVE CANCELED R, CM NEGATIVE NEGATIVE   Leukocyte Esterase, Urine  NEGATIVE NEGATIVE NEGATIVE NEGATIVE NEGATIVE CANCELED R, CM NEGATIVE NEGATIVE   Resulting Agency [facility]           PSA screen  Order: [id number]   Status: Final result       Dx: Screening for prostate cancer    Test Result Released: Yes (seen)    3 Result Notes      Component  Ref Range & Units 1 mo ago   PSA, TOTAL  < OR = 4.00 ng/mL 0.75      Comprehensive Metabolic Panel  Order: [id number]   Status: Final result       Dx: Routine general medical examination a...    Test Result Released: Yes (seen)    3 Result Notes      Component  Ref Range & Units 1 mo ago   GLUCOSE  65 - 99 mg/dL 79   Comment:               Fasting reference interval      UREA NITROGEN (BUN)  7 - 25 mg/dL 19   CREATININE  0.70 - 1.28 mg/dL 1.19   EGFR  > OR = 60 mL/min/1.73m2 66   SODIUM  135 - 146 mmol/L 135   POTASSIUM  3.5 - 5.3 mmol/L 4.4   CHLORIDE  98 - 110 mmol/L 102   CARBON DIOXIDE  20 - 32 mmol/L 26   ELECTROLYTE BALANCE  7 - 17 mmol/L (calc) 7   CALCIUM  8.6 - 10.3 mg/dL 8.9   PROTEIN, TOTAL  6.1 - 8.1 g/dL 7.3   ALBUMIN  3.6 - 5.1 g/dL 3.9   BILIRUBIN, TOTAL  0.2 - 1.2 mg/dL 0.9   ALKALINE PHOSPHATASE  35 - 144 U/L 70   AST  10 - 35 U/L 17   ALT  9 - 46 U/L 12   Resulting Agency [employer] [facility]              Narrative      Assessment/Plan   Problem List Items Addressed This Visit       Boyd's disease (Multi)    Relevant Medications    predniSONE (Deltasone) 5 mg tablet    Healthcare maintenance -  Primary    Relevant Orders    POCT UA Automated manually resulted (Completed)    Non-recurrent unilateral inguinal hernia without obstruction or gangrene    CT reported and noted below with findings  Stable right inguinal hernia containing fat.  Discussed with patient if pain and discomfort will give general surgery referral         Relevant Orders    Referral to General Surgery    Spinal stenosis of lumbar region without neurogenic claudication    Discussed with patient try ice/heat Tylenol every 12 hours will send to physical therapy if that fails to improve consider orthopedic management assistance.          Other Visit Diagnoses         Hypothyroidism, unspecified type        Relevant Medications    levothyroxine (Synthroid, Levoxyl) 50 mcg tablet      Osteopenia, unspecified location          Actinic keratosis        Relevant Orders    Referral to Dermatology        Discussed urine test results with no evidence of blood good news and back pain is certainly skeletal with review of x-ray of back showing degenerative disc disease and discussed thoroughly    In light of history of chronic steroid use will do bone density to evaluate spine

## 2025-06-25 ENCOUNTER — HOSPITAL ENCOUNTER (OUTPATIENT)
Dept: RADIOLOGY | Facility: HOSPITAL | Age: 70
Discharge: HOME | End: 2025-06-25
Payer: MEDICARE

## 2025-06-25 PROCEDURE — 77080 DXA BONE DENSITY AXIAL: CPT | Performed by: RADIOLOGY

## 2025-06-25 PROCEDURE — 77080 DXA BONE DENSITY AXIAL: CPT

## 2025-07-02 ENCOUNTER — APPOINTMENT (OUTPATIENT)
Dept: CARDIOLOGY | Facility: HOSPITAL | Age: 70
End: 2025-07-02
Payer: MEDICARE

## 2025-07-02 ENCOUNTER — APPOINTMENT (OUTPATIENT)
Dept: RADIOLOGY | Facility: HOSPITAL | Age: 70
End: 2025-07-02
Payer: MEDICARE

## 2025-07-02 ENCOUNTER — HOSPITAL ENCOUNTER (EMERGENCY)
Facility: HOSPITAL | Age: 70
Discharge: HOME | End: 2025-07-02
Attending: EMERGENCY MEDICINE
Payer: MEDICARE

## 2025-07-02 VITALS
WEIGHT: 220 LBS | OXYGEN SATURATION: 98 % | DIASTOLIC BLOOD PRESSURE: 75 MMHG | HEART RATE: 73 BPM | TEMPERATURE: 98.6 F | HEIGHT: 67 IN | BODY MASS INDEX: 34.53 KG/M2 | SYSTOLIC BLOOD PRESSURE: 156 MMHG | RESPIRATION RATE: 17 BRPM

## 2025-07-02 DIAGNOSIS — D72.829 LEUKOCYTOSIS, UNSPECIFIED TYPE: ICD-10-CM

## 2025-07-02 DIAGNOSIS — J02.9 ACUTE PHARYNGITIS, UNSPECIFIED ETIOLOGY: ICD-10-CM

## 2025-07-02 DIAGNOSIS — J06.9 UPPER RESPIRATORY TRACT INFECTION, UNSPECIFIED TYPE: Primary | ICD-10-CM

## 2025-07-02 DIAGNOSIS — N17.9 AKI (ACUTE KIDNEY INJURY): ICD-10-CM

## 2025-07-02 LAB
ALBUMIN SERPL BCP-MCNC: 4 G/DL (ref 3.4–5)
ALP SERPL-CCNC: 70 U/L (ref 33–136)
ALT SERPL W P-5'-P-CCNC: 12 U/L (ref 10–52)
ANION GAP SERPL CALC-SCNC: 15 MMOL/L (ref 10–20)
APPEARANCE UR: CLEAR
AST SERPL W P-5'-P-CCNC: 16 U/L (ref 9–39)
BASOPHILS # BLD AUTO: 0.06 X10*3/UL (ref 0–0.1)
BASOPHILS NFR BLD AUTO: 0.3 %
BILIRUB SERPL-MCNC: 1.1 MG/DL (ref 0–1.2)
BILIRUB UR STRIP.AUTO-MCNC: NEGATIVE MG/DL
BUN SERPL-MCNC: 25 MG/DL (ref 6–23)
CALCIUM SERPL-MCNC: 9.3 MG/DL (ref 8.6–10.3)
CARDIAC TROPONIN I PNL SERPL HS: 13 NG/L (ref 0–20)
CARDIAC TROPONIN I PNL SERPL HS: 15 NG/L (ref 0–20)
CHLORIDE SERPL-SCNC: 97 MMOL/L (ref 98–107)
CO2 SERPL-SCNC: 24 MMOL/L (ref 21–32)
COLOR UR: ABNORMAL
CREAT SERPL-MCNC: 1.42 MG/DL (ref 0.5–1.3)
EGFRCR SERPLBLD CKD-EPI 2021: 53 ML/MIN/1.73M*2
EOSINOPHIL # BLD AUTO: 0.2 X10*3/UL (ref 0–0.7)
EOSINOPHIL NFR BLD AUTO: 1 %
ERYTHROCYTE [DISTWIDTH] IN BLOOD BY AUTOMATED COUNT: 12.4 % (ref 11.5–14.5)
FLUAV RNA RESP QL NAA+PROBE: NOT DETECTED
FLUBV RNA RESP QL NAA+PROBE: NOT DETECTED
GLUCOSE SERPL-MCNC: 109 MG/DL (ref 74–99)
GLUCOSE UR STRIP.AUTO-MCNC: NORMAL MG/DL
HCT VFR BLD AUTO: 45.7 % (ref 41–52)
HGB BLD-MCNC: 16.1 G/DL (ref 13.5–17.5)
IMM GRANULOCYTES # BLD AUTO: 0.09 X10*3/UL (ref 0–0.7)
IMM GRANULOCYTES NFR BLD AUTO: 0.5 % (ref 0–0.9)
KETONES UR STRIP.AUTO-MCNC: ABNORMAL MG/DL
LEUKOCYTE ESTERASE UR QL STRIP.AUTO: NEGATIVE
LYMPHOCYTES # BLD AUTO: 3.14 X10*3/UL (ref 1.2–4.8)
LYMPHOCYTES NFR BLD AUTO: 16.4 %
MAGNESIUM SERPL-MCNC: 1.88 MG/DL (ref 1.6–2.4)
MCH RBC QN AUTO: 31.5 PG (ref 26–34)
MCHC RBC AUTO-ENTMCNC: 35.2 G/DL (ref 32–36)
MCV RBC AUTO: 89 FL (ref 80–100)
MONOCYTES # BLD AUTO: 1.68 X10*3/UL (ref 0.1–1)
MONOCYTES NFR BLD AUTO: 8.8 %
NEUTROPHILS # BLD AUTO: 14.01 X10*3/UL (ref 1.2–7.7)
NEUTROPHILS NFR BLD AUTO: 73 %
NITRITE UR QL STRIP.AUTO: NEGATIVE
NRBC BLD-RTO: 0 /100 WBCS (ref 0–0)
PH UR STRIP.AUTO: 5 [PH]
PLATELET # BLD AUTO: 269 X10*3/UL (ref 150–450)
POTASSIUM SERPL-SCNC: 4 MMOL/L (ref 3.5–5.3)
PROT SERPL-MCNC: 8.8 G/DL (ref 6.4–8.2)
PROT UR STRIP.AUTO-MCNC: NEGATIVE MG/DL
RBC # BLD AUTO: 5.11 X10*6/UL (ref 4.5–5.9)
RBC # UR STRIP.AUTO: NEGATIVE MG/DL
S PYO DNA THROAT QL NAA+PROBE: NOT DETECTED
SARS-COV-2 RNA RESP QL NAA+PROBE: NOT DETECTED
SODIUM SERPL-SCNC: 132 MMOL/L (ref 136–145)
SP GR UR STRIP.AUTO: 1.02
UROBILINOGEN UR STRIP.AUTO-MCNC: NORMAL MG/DL
WBC # BLD AUTO: 19.2 X10*3/UL (ref 4.4–11.3)

## 2025-07-02 PROCEDURE — 99285 EMERGENCY DEPT VISIT HI MDM: CPT | Mod: 25 | Performed by: EMERGENCY MEDICINE

## 2025-07-02 PROCEDURE — 80053 COMPREHEN METABOLIC PANEL: CPT | Performed by: EMERGENCY MEDICINE

## 2025-07-02 PROCEDURE — 36415 COLL VENOUS BLD VENIPUNCTURE: CPT | Performed by: EMERGENCY MEDICINE

## 2025-07-02 PROCEDURE — 2500000004 HC RX 250 GENERAL PHARMACY W/ HCPCS (ALT 636 FOR OP/ED): Performed by: PHYSICIAN ASSISTANT

## 2025-07-02 PROCEDURE — 71046 X-RAY EXAM CHEST 2 VIEWS: CPT | Performed by: RADIOLOGY

## 2025-07-02 PROCEDURE — 2500000005 HC RX 250 GENERAL PHARMACY W/O HCPCS: Performed by: PHYSICIAN ASSISTANT

## 2025-07-02 PROCEDURE — 93005 ELECTROCARDIOGRAM TRACING: CPT

## 2025-07-02 PROCEDURE — 96361 HYDRATE IV INFUSION ADD-ON: CPT

## 2025-07-02 PROCEDURE — 81003 URINALYSIS AUTO W/O SCOPE: CPT | Performed by: PHYSICIAN ASSISTANT

## 2025-07-02 PROCEDURE — 96374 THER/PROPH/DIAG INJ IV PUSH: CPT

## 2025-07-02 PROCEDURE — 71046 X-RAY EXAM CHEST 2 VIEWS: CPT

## 2025-07-02 PROCEDURE — 84484 ASSAY OF TROPONIN QUANT: CPT | Performed by: EMERGENCY MEDICINE

## 2025-07-02 PROCEDURE — 83735 ASSAY OF MAGNESIUM: CPT | Performed by: EMERGENCY MEDICINE

## 2025-07-02 PROCEDURE — 85025 COMPLETE CBC W/AUTO DIFF WBC: CPT | Performed by: EMERGENCY MEDICINE

## 2025-07-02 PROCEDURE — 87651 STREP A DNA AMP PROBE: CPT | Performed by: PHYSICIAN ASSISTANT

## 2025-07-02 PROCEDURE — 87636 SARSCOV2 & INF A&B AMP PRB: CPT | Performed by: EMERGENCY MEDICINE

## 2025-07-02 PROCEDURE — 99285 EMERGENCY DEPT VISIT HI MDM: CPT | Performed by: PHYSICIAN ASSISTANT

## 2025-07-02 RX ORDER — FLUTICASONE PROPIONATE 50 MCG
1 SPRAY, SUSPENSION (ML) NASAL DAILY
Qty: 16 G | Refills: 0 | Status: SHIPPED | OUTPATIENT
Start: 2025-07-02 | End: 2025-08-01

## 2025-07-02 RX ORDER — LIDOCAINE HYDROCHLORIDE 20 MG/ML
5 SOLUTION OROPHARYNGEAL ONCE
Status: COMPLETED | OUTPATIENT
Start: 2025-07-02 | End: 2025-07-02

## 2025-07-02 RX ORDER — KETOROLAC TROMETHAMINE 15 MG/ML
15 INJECTION, SOLUTION INTRAMUSCULAR; INTRAVENOUS ONCE
Status: COMPLETED | OUTPATIENT
Start: 2025-07-02 | End: 2025-07-02

## 2025-07-02 RX ORDER — IBUPROFEN 600 MG/1
600 TABLET, FILM COATED ORAL EVERY 6 HOURS PRN
Qty: 20 TABLET | Refills: 0 | Status: SHIPPED | OUTPATIENT
Start: 2025-07-02 | End: 2025-07-09

## 2025-07-02 RX ORDER — BENZOCAINE AND MENTHOL 15; 3.6 MG/1; MG/1
1 LOZENGE ORAL EVERY 2 HOUR PRN
Qty: 20 LOZENGE | Refills: 0 | Status: SHIPPED | OUTPATIENT
Start: 2025-07-02 | End: 2025-07-07

## 2025-07-02 RX ADMIN — SODIUM CHLORIDE 1000 ML: 0.9 INJECTION, SOLUTION INTRAVENOUS at 09:27

## 2025-07-02 RX ADMIN — SODIUM CHLORIDE 1000 ML: 0.9 INJECTION, SOLUTION INTRAVENOUS at 10:16

## 2025-07-02 RX ADMIN — LIDOCAINE HYDROCHLORIDE 5 ML: 20 SOLUTION ORAL at 12:02

## 2025-07-02 RX ADMIN — KETOROLAC TROMETHAMINE 15 MG: 15 INJECTION, SOLUTION INTRAMUSCULAR; INTRAVENOUS at 09:27

## 2025-07-02 RX ADMIN — LIDOCAINE HYDROCHLORIDE 5 ML: 20 SOLUTION ORAL at 09:27

## 2025-07-02 ASSESSMENT — PAIN DESCRIPTION - PAIN TYPE: TYPE: ACUTE PAIN

## 2025-07-02 ASSESSMENT — PAIN SCALES - GENERAL
PAINLEVEL_OUTOF10: 0 - NO PAIN
PAINLEVEL_OUTOF10: 4

## 2025-07-02 ASSESSMENT — PAIN - FUNCTIONAL ASSESSMENT: PAIN_FUNCTIONAL_ASSESSMENT: 0-10

## 2025-07-02 ASSESSMENT — PAIN DESCRIPTION - FREQUENCY: FREQUENCY: CONSTANT/CONTINUOUS

## 2025-07-02 ASSESSMENT — PAIN DESCRIPTION - LOCATION: LOCATION: THROAT

## 2025-07-02 ASSESSMENT — PAIN DESCRIPTION - PROGRESSION: CLINICAL_PROGRESSION: NOT CHANGED

## 2025-07-02 NOTE — DISCHARGE INSTRUCTIONS
Please return to the ER or seek immediate medical attention if you experience new or worsening chest pain, shortness of breath, fever of 38C (100.4) or higher, persistent vomiting, weakness, numbness, tingling, excessive sweating,  loss of motion in your arms or legs, fainting, vision changes, or any new or worsening symptoms., fever of 100.4 (38C) or higher that does not respond to acetaminophen or ibuprofen, persistent vomiting, inability to open your jaw, hot potato voice, stridor, difficulty with physically swallowing, difficulty breathing, chest pain, or worsening of your current symptoms, and signs of dehydration (decreased oral intake, decreased urine and stool output, crying and not making any tears, urinating less than 2-3 times in 24 hours, dry cracked lips), increased work of breathing (retractions between the ribs above the collarbone or above the breastbone), or worsening of their current condition despite treatment plan.    You are welcome back any time. Thank you for entrusting your care to us, I hope we made your visit as pleasant as possible. Wishing you well!    Alysha Chisholm PA-C

## 2025-07-02 NOTE — ED PROVIDER NOTES
Emergency Department Provider Note        History of Present Illness     History provided by: Patient  Limitations to History: None  External Records Reviewed with Brief Summary: pmhx, pcp notes    HPI:  Adrian Rosa is a 69 y.o. male with history of Mayo's disease, obesity who presents today for evaluation of multiple complaints, patient states that on Sunday night, approximately 3-1/2 days ago he started feeling sick, states that he was under a bunch of blankets and was shivering, thinks he may have had a fever.  He states he started getting a lot of sinus pressure and congestion as well as a generalized sore throat, states is painful to swallow and also to cough because his throat hurts so bad.  Denies any sick exposures, denies any ear pain.  He does endorse some associated chest pressure along with his cough, the cough was productive of white phlegm, no hemoptysis, states the pain is across the center of his chest, nonradiating, nonexertional, denies any history of heart problems.  Denies leg pain or swelling, recent surgeries hospitalizations or travel, malignancy or hormone use.  He is on 5 mg prednisone daily for his Mayo's disease and has been taking as prescribed.  Patient denies abdominal pain nausea or vomiting.  Denies diarrhea.  Patient tells me he has not been able to eat or drink anything but states that yesterday he had potatoes, broccoli and has been trying to drink water.    Physical Exam   Triage vitals:  T 37.1 °C (98.8 °F)  HR 78  /81  RR 18  O2 98 % None (Room air)    Physical Exam  Vitals and nursing note reviewed.   Constitutional:       General: He is not in acute distress.     Appearance: Normal appearance. He is not toxic-appearing.   HENT:      Head: Normocephalic and atraumatic.      Right Ear: Tympanic membrane and ear canal normal.      Left Ear: Tympanic membrane and ear canal normal.      Nose: Nose normal.      Right Turbinates: Swollen. Not enlarged or pale.       Left Turbinates: Not enlarged, swollen or pale.      Right Sinus: No maxillary sinus tenderness or frontal sinus tenderness.      Left Sinus: No maxillary sinus tenderness or frontal sinus tenderness.      Mouth/Throat:      Lips: Pink.      Mouth: Mucous membranes are moist.      Tongue: No lesions. Tongue does not deviate from midline.      Palate: No mass and lesions.      Pharynx: Oropharynx is clear. Uvula midline. Posterior oropharyngeal erythema present. No pharyngeal swelling, oropharyngeal exudate, uvula swelling or postnasal drip.   Eyes:      Extraocular Movements: Extraocular movements intact.   Cardiovascular:      Rate and Rhythm: Normal rate and regular rhythm.   Pulmonary:      Effort: Pulmonary effort is normal.      Breath sounds: Normal breath sounds. No wheezing, rhonchi or rales.   Abdominal:      Palpations: Abdomen is soft.   Musculoskeletal:         General: Normal range of motion.      Cervical back: Normal range of motion and neck supple.   Skin:     General: Skin is warm and dry.   Neurological:      General: No focal deficit present.      Mental Status: He is alert.   Psychiatric:         Mood and Affect: Mood normal.         Thought Content: Thought content normal.        XR chest 2 views   Final Result   DJD in the thoracic spine as described.        Otherwise, negative exam.        MACRO:   None        Signed by: Homero Choudhary 7/2/2025 9:41 AM   Dictation workstation:   [id number]        Labs Reviewed   CBC WITH AUTO DIFFERENTIAL - Abnormal       Result Value    WBC 19.2 (*)     nRBC 0.0      RBC 5.11      Hemoglobin 16.1      Hematocrit 45.7      MCV 89      MCH 31.5      MCHC 35.2      RDW 12.4      Platelets 269      Neutrophils % 73.0      Immature Granulocytes %, Automated 0.5      Lymphocytes % 16.4      Monocytes % 8.8      Eosinophils % 1.0      Basophils % 0.3      Neutrophils Absolute 14.01 (*)     Immature Granulocytes Absolute, Automated 0.09      Lymphocytes Absolute 3.14       Monocytes Absolute 1.68 (*)     Eosinophils Absolute 0.20      Basophils Absolute 0.06     COMPREHENSIVE METABOLIC PANEL - Abnormal    Glucose 109 (*)     Sodium 132 (*)     Potassium 4.0      Chloride 97 (*)     Bicarbonate 24      Anion Gap 15      Urea Nitrogen 25 (*)     Creatinine 1.42 (*)     eGFR 53 (*)     Calcium 9.3      Albumin 4.0      Alkaline Phosphatase 70      Total Protein 8.8 (*)     AST 16      Bilirubin, Total 1.1      ALT 12     URINALYSIS WITH REFLEX CULTURE AND MICROSCOPIC - Abnormal    Color, Urine Light-Yellow      Appearance, Urine Clear      Specific Gravity, Urine 1.024      pH, Urine 5.0      Protein, Urine NEGATIVE      Glucose, Urine Normal      Blood, Urine NEGATIVE      Ketones, Urine 10 (1+) (*)     Bilirubin, Urine NEGATIVE      Urobilinogen, Urine Normal      Nitrite, Urine NEGATIVE      Leukocyte Esterase, Urine NEGATIVE     GROUP A STREPTOCOCCUS, PCR - Normal    Group A Strep PCR Not Detected     SARS-COV-2 AND INFLUENZA A/B PCR - Normal    Flu A Result Not Detected      Flu B Result Not Detected      Coronavirus 2019, PCR Not Detected      Narrative:     This assay is an FDA-cleared, in vitro diagnostic nucleic acid amplification test for the qualitative detection and differentiation of SARS CoV-2/ Influenza A/B from nasopharyngeal specimens collected from individuals with signs and symptoms of respiratory tract infections, and has been validated for use at [facility]. Negative results do not preclude COVID-19/ Influenza A/B infections and should not be used as the sole basis for diagnosis, treatment, or other management decisions. Testing for SARS CoV-2 is recommended only for patients who meet current clinical and/or epidemiological criteria defined by federal, state, or local public health directives.   MAGNESIUM - Normal    Magnesium 1.88     TROPONIN I, HIGH SENSITIVITY - Normal    Troponin I, High Sensitivity 15      Narrative:     Less than  99th percentile of normal range cutoff-  Female and children under 18 years old <14 ng/L; Male <21 ng/L: Negative  Repeat testing should be performed if clinically indicated.     Female and children under 18 years old 14-50 ng/L; Male 21-50 ng/L:  Consistent with possible cardiac damage and possible increased clinical   risk. Serial measurements may help to assess extent of myocardial damage.     >50 ng/L: Consistent with cardiac damage, increased clinical risk and  myocardial infarction. Serial measurements may help assess extent of   myocardial damage.      NOTE: Children less than 1 year old may have higher baseline troponin   levels and results should be interpreted in conjunction with the overall   clinical context.     NOTE: Troponin I testing is performed using a different   testing methodology at [facility] than at other   [facility]. Direct result comparisons should only   be made within the same method.   TROPONIN I, HIGH SENSITIVITY - Normal    Troponin I, High Sensitivity 13      Narrative:     Less than 99th percentile of normal range cutoff-  Female and children under 18 years old <14 ng/L; Male <21 ng/L: Negative  Repeat testing should be performed if clinically indicated.     Female and children under 18 years old 14-50 ng/L; Male 21-50 ng/L:  Consistent with possible cardiac damage and possible increased clinical   risk. Serial measurements may help to assess extent of myocardial damage.     >50 ng/L: Consistent with cardiac damage, increased clinical risk and  myocardial infarction. Serial measurements may help assess extent of   myocardial damage.      NOTE: Children less than 1 year old may have higher baseline troponin   levels and results should be interpreted in conjunction with the overall   clinical context.     NOTE: Troponin I testing is performed using a different   testing methodology at [facility] than at other   [facility]. Direct result  comparisons should only   be made within the same method.   URINALYSIS WITH REFLEX CULTURE AND MICROSCOPIC    Narrative:     The following orders were created for panel order Urinalysis with Reflex Culture and Microscopic.  Procedure                               Abnormality         Status                     ---------                               -----------         ------                     Urinalysis with Reflex C...[[id number]]  Abnormal            Final result               Extra Urine Gray Tube[[id number]]                            In process                   Please view results for these tests on the individual orders.   EXTRA URINE GRAY TUBE     ED Course as of 07/02/25 1844   Wed Jul 02, 2025   0931 ECG 12 lead  NSR rate of 70, , QRS 88, QTc 416, normal axis, no ischemic changes. [MC]   1155 Patient states he feels better, is requesting more viscous lidocaine. [MC]   1216 EKG: Sinus rhythm at 70 bpm.  .  QRS 88.  QTc 416.  Normal axis.  No ST elevation, no acute ischemic [PS]   1255    spoke with patient at this time, he is feeling better, tolerating p.o., I discussed with him that he had an LUIS FERNANDO and he was dehydrated hence why we gave him 2 L, also discussed with him with his elevated white blood cell count, at this time there is no clear source for this but have no concerns for peritonsillar or retropharyngeal abscess given that he has normal movement of the neck, no difficulty swallowing, no unilateral throat pain, his chest x-ray urine and strep swab are all negative, his cardiac workup is unremarkable.  I discussed with him that with the Fourth of July weekend coming up I want him to have a very low threshold to return to the ER, if he goes home with the medications provided he is not feeling any better, he cannot eat or drink, cannot tolerate p.o., his sore throat gets worse, becomes one-sided, he has difficulty moving his neck, spikes a fever develops a headache etc. that these are all  the reasons to return back to the emergency department, patient expressed understanding, promises he will return back if any new or worsening symptoms. [MC]      ED Course User Index  [MC] Alysha Chisholm PA-C  [PS] Buddy Chery DO         Diagnoses as of 25 1844   Upper respiratory tract infection, unspecified type   Acute pharyngitis, unspecified etiology   LUIS FERNANDO (acute kidney injury)   Leukocytosis, unspecified type         Medical Decision Making & ED Course   Medical Decision Makin y.o. male presents today for evaluation of sinus congestion, sore throat, cough, chest tightness.  The chest pain tightness is nonradiating, nonexertional, not pleuritic, there is no associated hemoptysis, he has normal vital signs, low suspicion for cardiac process however given that patient is 69 years old I did order an EKG, troponin, chest x-ray and labs to rule out cardiac process in addition to electrolytes given that he endorses decreased p.o. intake.  Did also swab him for flu COVID and strep and obtained a chest x-ray.  He was given a liter of fluids, IV Toradol and lidocaine.    Patient's initial white blood cell count was elevated at 19,000, I did discuss case with the ED attending who also saw the patient, his urinalysis was negative for infection, troponins were flat x 2, flu and COVID were negative, strep test was negative, he did have an LUIS FERNANDO with a creatinine of 1.42, received 2 L of fluids, chest x-ray negative for acute cardiopulmonary abnormality.  He has no neck pain, no stiffness, no unilateral pain, no indication for CT of the neck.  On reassessment patient is feeling much better especially after the viscous lidocaine.  He is tolerating p.o. without difficulty.  Somewhat unusual that he has an elevated white blood cell count without a clear bacterial source of infection however he is not tachycardic, not febrile, low suspicion for sepsis, I did discuss with patient his leukocytosis and his LUIS FERNANDO and  the need for very close monitoring, he is aware that if he has any worsening symptoms, spikes of fever, feels weak, dizzy, has any development of chest pain, shortness of breath, cough, urinary symptoms, abdominal pain etc. or any symptoms that were not present here today or if he is getting dehydrated not able to eat and drink that I would encourage him to return immediately to the emergency department, patient expressed understanding, states if he feels any worse he will come right back.  Patient will be discharged with Flonase, Cepacol and anti-inflammatories for his throat, return precautions discussed.  ----      Differential diagnoses considered include but are not limited to: COVID, viral pharyngitis, MI, etc.     Social Determinants of Health which Significantly Impact Care: None identified     EKG Independent Interpretation: EKG interpreted by myself. Please see ED Course for full interpretation.    Independent Result Review and Interpretation: Relevant laboratory and radiographic results were reviewed and independently interpreted by myself.  As necessary, they are commented on in the ED Course.    Chronic conditions affecting the patient's care: As documented above in [facility]    The patient was discussed with the following consultants/services: None    Care Considerations: As documented above in [facility]    ED Course:  ED Course as of 07/02/25 1844   Wed Jul 02, 2025   0931 ECG 12 lead  NSR rate of 70, , QRS 88, QTc 416, normal axis, no ischemic changes. [MC]   1155 Patient states he feels better, is requesting more viscous lidocaine. [MC]   1216 EKG: Sinus rhythm at 70 bpm.  .  QRS 88.  QTc 416.  Normal axis.  No ST elevation, no acute ischemic [PS]   1255    spoke with patient at this time, he is feeling better, tolerating p.o., I discussed with him that he had an LUIS FERNANDO and he was dehydrated hence why we gave him 2 L, also discussed with him with his elevated white blood cell count, at this time there  is no clear source for this but have no concerns for peritonsillar or retropharyngeal abscess given that he has normal movement of the neck, no difficulty swallowing, no unilateral throat pain, his chest x-ray urine and strep swab are all negative, his cardiac workup is unremarkable.  I discussed with him that with the Fourth of July weekend coming up I want him to have a very low threshold to return to the ER, if he goes home with the medications provided he is not feeling any better, he cannot eat or drink, cannot tolerate p.o., his sore throat gets worse, becomes one-sided, he has difficulty moving his neck, spikes a fever develops a headache etc. that these are all the reasons to return back to the emergency department, patient expressed understanding, promises he will return back if any new or worsening symptoms. [MC]      ED Course User Index  [MC] Alysha Chisholm PA-C  [PS] Buddy Chery DO         Diagnoses as of 07/02/25 1844   Upper respiratory tract infection, unspecified type   Acute pharyngitis, unspecified etiology   LUIS FERNANDO (acute kidney injury)   Leukocytosis, unspecified type     Disposition   As a result of the work-up, the patient was discharged home.  he was informed of his diagnosis and instructed to come back with any concerns or worsening of condition.  he and was agreeable to the plan as discussed above.  he was given the opportunity to ask questions.  All of the patient's questions were answered.    Procedures   Procedures    This was a shared visit with an ED attending.  The patient was seen and discussed with the ED attending    Alysha Chisholm PA-C  Emergency Medicine       Alysha Chisholm PA-C  07/02/25 [id number]

## 2025-07-03 LAB
ATRIAL RATE: 70 BPM
HOLD SPECIMEN: NORMAL
P AXIS: 73 DEGREES
P OFFSET: 186 MS
P ONSET: 130 MS
PR INTERVAL: 164 MS
Q ONSET: 212 MS
QRS COUNT: 12 BEATS
QRS DURATION: 88 MS
QT INTERVAL: 386 MS
QTC CALCULATION(BAZETT): 416 MS
QTC FREDERICIA: 406 MS
R AXIS: 57 DEGREES
T AXIS: 52 DEGREES
T OFFSET: 405 MS
VENTRICULAR RATE: 70 BPM

## 2025-07-08 ENCOUNTER — APPOINTMENT (OUTPATIENT)
Dept: RADIOLOGY | Facility: HOSPITAL | Age: 70
DRG: 194 | End: 2025-07-08
Payer: MEDICARE

## 2025-07-08 ENCOUNTER — APPOINTMENT (OUTPATIENT)
Dept: CARDIOLOGY | Facility: HOSPITAL | Age: 70
DRG: 194 | End: 2025-07-08
Payer: MEDICARE

## 2025-07-08 ENCOUNTER — HOSPITAL ENCOUNTER (INPATIENT)
Facility: HOSPITAL | Age: 70
LOS: 2 days | Discharge: HOME | DRG: 194 | End: 2025-07-10
Attending: EMERGENCY MEDICINE | Admitting: INTERNAL MEDICINE
Payer: MEDICARE

## 2025-07-08 DIAGNOSIS — J18.9 PNEUMONIA OF RIGHT LUNG DUE TO INFECTIOUS ORGANISM, UNSPECIFIED PART OF LUNG: ICD-10-CM

## 2025-07-08 DIAGNOSIS — E87.1 HYPONATREMIA: Primary | ICD-10-CM

## 2025-07-08 DIAGNOSIS — E87.29 INCREASED ANION GAP METABOLIC ACIDOSIS: ICD-10-CM

## 2025-07-08 DIAGNOSIS — E03.8 SUBCLINICAL HYPOTHYROIDISM: ICD-10-CM

## 2025-07-08 DIAGNOSIS — E87.29 STARVATION KETOACIDOSIS: ICD-10-CM

## 2025-07-08 DIAGNOSIS — T73.0XXA STARVATION KETOACIDOSIS: ICD-10-CM

## 2025-07-08 PROBLEM — R53.1 GENERALIZED WEAKNESS: Status: ACTIVE | Noted: 2025-07-08

## 2025-07-08 PROBLEM — R63.0 POOR APPETITE: Status: ACTIVE | Noted: 2025-07-08

## 2025-07-08 PROBLEM — R79.89 ELEVATED TROPONIN: Status: ACTIVE | Noted: 2025-07-08

## 2025-07-08 LAB
ALBUMIN SERPL BCP-MCNC: 3.3 G/DL (ref 3.4–5)
ALP SERPL-CCNC: 65 U/L (ref 33–136)
ALT SERPL W P-5'-P-CCNC: 27 U/L (ref 10–52)
ANION GAP SERPL CALC-SCNC: 17 MMOL/L
APPEARANCE UR: CLEAR
AST SERPL W P-5'-P-CCNC: 39 U/L (ref 9–39)
ATRIAL RATE: 71 BPM
B-OH-BUTYR SERPL-SCNC: 1.6 MMOL/L (ref 0.02–0.27)
BASOPHILS # BLD AUTO: 0.03 X10*3/UL (ref 0–0.1)
BASOPHILS NFR BLD AUTO: 0.2 %
BILIRUB SERPL-MCNC: 1 MG/DL (ref 0–1.2)
BILIRUB UR STRIP.AUTO-MCNC: NEGATIVE MG/DL
BUN SERPL-MCNC: 23 MG/DL (ref 6–23)
CALCIUM SERPL-MCNC: 8.7 MG/DL (ref 8.6–10.3)
CARDIAC TROPONIN I PNL SERPL HS: 45 NG/L (ref 0–20)
CARDIAC TROPONIN I PNL SERPL HS: 55 NG/L (ref 0–20)
CHLORIDE SERPL-SCNC: 94 MMOL/L (ref 98–107)
CO2 SERPL-SCNC: 19 MMOL/L (ref 21–32)
COLOR UR: YELLOW
CREAT SERPL-MCNC: 1.19 MG/DL (ref 0.5–1.3)
EGFRCR SERPLBLD CKD-EPI 2021: 66 ML/MIN/1.73M*2
EOSINOPHIL # BLD AUTO: 0.12 X10*3/UL (ref 0–0.7)
EOSINOPHIL NFR BLD AUTO: 1 %
ERYTHROCYTE [DISTWIDTH] IN BLOOD BY AUTOMATED COUNT: 12.1 % (ref 11.5–14.5)
FLUAV RNA RESP QL NAA+PROBE: NOT DETECTED
FLUBV RNA RESP QL NAA+PROBE: NOT DETECTED
GLUCOSE BLD MANUAL STRIP-MCNC: 154 MG/DL (ref 74–99)
GLUCOSE BLD MANUAL STRIP-MCNC: 226 MG/DL (ref 74–99)
GLUCOSE BLD MANUAL STRIP-MCNC: 239 MG/DL (ref 74–99)
GLUCOSE SERPL-MCNC: 114 MG/DL (ref 74–99)
GLUCOSE UR STRIP.AUTO-MCNC: ABNORMAL MG/DL
HCT VFR BLD AUTO: 37.4 % (ref 41–52)
HGB BLD-MCNC: 13.3 G/DL (ref 13.5–17.5)
IMM GRANULOCYTES # BLD AUTO: 0.07 X10*3/UL (ref 0–0.7)
IMM GRANULOCYTES NFR BLD AUTO: 0.6 % (ref 0–0.9)
KETONES UR STRIP.AUTO-MCNC: ABNORMAL MG/DL
LEUKOCYTE ESTERASE UR QL STRIP.AUTO: NEGATIVE
LIPASE SERPL-CCNC: 7 U/L (ref 9–82)
LYMPHOCYTES # BLD AUTO: 2.47 X10*3/UL (ref 1.2–4.8)
LYMPHOCYTES NFR BLD AUTO: 19.8 %
MAGNESIUM SERPL-MCNC: 1.79 MG/DL (ref 1.6–2.4)
MCH RBC QN AUTO: 30.9 PG (ref 26–34)
MCHC RBC AUTO-ENTMCNC: 35.6 G/DL (ref 32–36)
MCV RBC AUTO: 87 FL (ref 80–100)
MONOCYTES # BLD AUTO: 1.12 X10*3/UL (ref 0.1–1)
MONOCYTES NFR BLD AUTO: 9 %
MUCOUS THREADS #/AREA URNS AUTO: ABNORMAL /LPF
NEUTROPHILS # BLD AUTO: 8.69 X10*3/UL (ref 1.2–7.7)
NEUTROPHILS NFR BLD AUTO: 69.4 %
NITRITE UR QL STRIP.AUTO: NEGATIVE
NRBC BLD-RTO: 0 /100 WBCS (ref 0–0)
P AXIS: 69 DEGREES
P OFFSET: 205 MS
P ONSET: 140 MS
PH UR STRIP.AUTO: 5.5 [PH]
PHOSPHATE SERPL-MCNC: 2.9 MG/DL (ref 2.5–4.9)
PLATELET # BLD AUTO: 353 X10*3/UL (ref 150–450)
POTASSIUM SERPL-SCNC: 4 MMOL/L (ref 3.5–5.3)
PR INTERVAL: 164 MS
PROCALCITONIN SERPL-MCNC: 0.53 NG/ML
PROT SERPL-MCNC: 8.1 G/DL (ref 6.4–8.2)
PROT UR STRIP.AUTO-MCNC: ABNORMAL MG/DL
Q ONSET: 222 MS
QRS COUNT: 11 BEATS
QRS DURATION: 82 MS
QT INTERVAL: 416 MS
QTC CALCULATION(BAZETT): 452 MS
QTC FREDERICIA: 440 MS
R AXIS: 45 DEGREES
RBC # BLD AUTO: 4.3 X10*6/UL (ref 4.5–5.9)
RBC # UR STRIP.AUTO: ABNORMAL MG/DL
RBC #/AREA URNS AUTO: ABNORMAL /HPF
SARS-COV-2 RNA RESP QL NAA+PROBE: NOT DETECTED
SODIUM SERPL-SCNC: 126 MMOL/L (ref 136–145)
SP GR UR STRIP.AUTO: >1.05
T AXIS: 35 DEGREES
T OFFSET: 430 MS
UROBILINOGEN UR STRIP.AUTO-MCNC: NORMAL MG/DL
VENTRICULAR RATE: 71 BPM
WBC # BLD AUTO: 12.5 X10*3/UL (ref 4.4–11.3)
WBC #/AREA URNS AUTO: ABNORMAL /HPF
YEAST BUDDING #/AREA UR COMP ASSIST: PRESENT /HPF

## 2025-07-08 PROCEDURE — 84484 ASSAY OF TROPONIN QUANT: CPT | Performed by: EMERGENCY MEDICINE

## 2025-07-08 PROCEDURE — 1200000002 HC GENERAL ROOM WITH TELEMETRY DAILY

## 2025-07-08 PROCEDURE — 71046 X-RAY EXAM CHEST 2 VIEWS: CPT | Performed by: RADIOLOGY

## 2025-07-08 PROCEDURE — 93005 ELECTROCARDIOGRAM TRACING: CPT

## 2025-07-08 PROCEDURE — 84075 ASSAY ALKALINE PHOSPHATASE: CPT | Performed by: EMERGENCY MEDICINE

## 2025-07-08 PROCEDURE — 82010 KETONE BODYS QUAN: CPT | Performed by: STUDENT IN AN ORGANIZED HEALTH CARE EDUCATION/TRAINING PROGRAM

## 2025-07-08 PROCEDURE — 2500000004 HC RX 250 GENERAL PHARMACY W/ HCPCS (ALT 636 FOR OP/ED): Performed by: INTERNAL MEDICINE

## 2025-07-08 PROCEDURE — 85025 COMPLETE CBC W/AUTO DIFF WBC: CPT | Performed by: EMERGENCY MEDICINE

## 2025-07-08 PROCEDURE — 84484 ASSAY OF TROPONIN QUANT: CPT | Performed by: STUDENT IN AN ORGANIZED HEALTH CARE EDUCATION/TRAINING PROGRAM

## 2025-07-08 PROCEDURE — 82947 ASSAY GLUCOSE BLOOD QUANT: CPT

## 2025-07-08 PROCEDURE — 94640 AIRWAY INHALATION TREATMENT: CPT

## 2025-07-08 PROCEDURE — 2500000001 HC RX 250 WO HCPCS SELF ADMINISTERED DRUGS (ALT 637 FOR MEDICARE OP): Performed by: INTERNAL MEDICINE

## 2025-07-08 PROCEDURE — 96365 THER/PROPH/DIAG IV INF INIT: CPT

## 2025-07-08 PROCEDURE — 36415 COLL VENOUS BLD VENIPUNCTURE: CPT | Performed by: EMERGENCY MEDICINE

## 2025-07-08 PROCEDURE — 2500000002 HC RX 250 W HCPCS SELF ADMINISTERED DRUGS (ALT 637 FOR MEDICARE OP, ALT 636 FOR OP/ED): Performed by: INTERNAL MEDICINE

## 2025-07-08 PROCEDURE — 2500000004 HC RX 250 GENERAL PHARMACY W/ HCPCS (ALT 636 FOR OP/ED): Performed by: EMERGENCY MEDICINE

## 2025-07-08 PROCEDURE — 99285 EMERGENCY DEPT VISIT HI MDM: CPT | Performed by: EMERGENCY MEDICINE

## 2025-07-08 PROCEDURE — 87636 SARSCOV2 & INF A&B AMP PRB: CPT | Performed by: STUDENT IN AN ORGANIZED HEALTH CARE EDUCATION/TRAINING PROGRAM

## 2025-07-08 PROCEDURE — 74177 CT ABD & PELVIS W/CONTRAST: CPT

## 2025-07-08 PROCEDURE — 84100 ASSAY OF PHOSPHORUS: CPT | Performed by: EMERGENCY MEDICINE

## 2025-07-08 PROCEDURE — 2550000001 HC RX 255 CONTRASTS: Performed by: EMERGENCY MEDICINE

## 2025-07-08 PROCEDURE — 83735 ASSAY OF MAGNESIUM: CPT | Performed by: EMERGENCY MEDICINE

## 2025-07-08 PROCEDURE — 83690 ASSAY OF LIPASE: CPT | Performed by: EMERGENCY MEDICINE

## 2025-07-08 PROCEDURE — 74177 CT ABD & PELVIS W/CONTRAST: CPT | Performed by: STUDENT IN AN ORGANIZED HEALTH CARE EDUCATION/TRAINING PROGRAM

## 2025-07-08 PROCEDURE — 81001 URINALYSIS AUTO W/SCOPE: CPT | Performed by: INTERNAL MEDICINE

## 2025-07-08 PROCEDURE — 96361 HYDRATE IV INFUSION ADD-ON: CPT

## 2025-07-08 PROCEDURE — 99223 1ST HOSP IP/OBS HIGH 75: CPT | Performed by: INTERNAL MEDICINE

## 2025-07-08 PROCEDURE — 71046 X-RAY EXAM CHEST 2 VIEWS: CPT

## 2025-07-08 PROCEDURE — 99285 EMERGENCY DEPT VISIT HI MDM: CPT | Mod: 25 | Performed by: EMERGENCY MEDICINE

## 2025-07-08 PROCEDURE — 2500000004 HC RX 250 GENERAL PHARMACY W/ HCPCS (ALT 636 FOR OP/ED): Performed by: STUDENT IN AN ORGANIZED HEALTH CARE EDUCATION/TRAINING PROGRAM

## 2025-07-08 PROCEDURE — 84145 PROCALCITONIN (PCT): CPT | Mod: STJLAB | Performed by: INTERNAL MEDICINE

## 2025-07-08 PROCEDURE — 96367 TX/PROPH/DG ADDL SEQ IV INF: CPT

## 2025-07-08 RX ORDER — FLUTICASONE PROPIONATE 50 MCG
1 SPRAY, SUSPENSION (ML) NASAL DAILY
Status: DISCONTINUED | OUTPATIENT
Start: 2025-07-08 | End: 2025-07-10 | Stop reason: HOSPADM

## 2025-07-08 RX ORDER — IPRATROPIUM BROMIDE AND ALBUTEROL SULFATE 2.5; .5 MG/3ML; MG/3ML
3 SOLUTION RESPIRATORY (INHALATION) EVERY 6 HOURS PRN
Status: DISCONTINUED | OUTPATIENT
Start: 2025-07-08 | End: 2025-07-10 | Stop reason: HOSPADM

## 2025-07-08 RX ORDER — DEXTROSE, SODIUM CHLORIDE, SODIUM LACTATE, POTASSIUM CHLORIDE, AND CALCIUM CHLORIDE 5; .6; .31; .03; .02 G/100ML; G/100ML; G/100ML; G/100ML; G/100ML
75 INJECTION, SOLUTION INTRAVENOUS CONTINUOUS
Status: DISCONTINUED | OUTPATIENT
Start: 2025-07-08 | End: 2025-07-08

## 2025-07-08 RX ORDER — NAPROXEN SODIUM 220 MG/1
81 TABLET, FILM COATED ORAL DAILY
Status: DISCONTINUED | OUTPATIENT
Start: 2025-07-08 | End: 2025-07-10 | Stop reason: HOSPADM

## 2025-07-08 RX ORDER — SODIUM CHLORIDE 9 MG/ML
100 INJECTION, SOLUTION INTRAVENOUS CONTINUOUS
Status: ACTIVE | OUTPATIENT
Start: 2025-07-08 | End: 2025-07-08

## 2025-07-08 RX ORDER — PREDNISONE 20 MG/1
40 TABLET ORAL DAILY
Status: DISCONTINUED | OUTPATIENT
Start: 2025-07-09 | End: 2025-07-10

## 2025-07-08 RX ORDER — GUAIFENESIN 600 MG/1
1200 TABLET, EXTENDED RELEASE ORAL 2 TIMES DAILY
Status: DISCONTINUED | OUTPATIENT
Start: 2025-07-08 | End: 2025-07-09

## 2025-07-08 RX ORDER — BENZONATATE 100 MG/1
100 CAPSULE ORAL 3 TIMES DAILY PRN
Status: DISCONTINUED | OUTPATIENT
Start: 2025-07-08 | End: 2025-07-09

## 2025-07-08 RX ORDER — ATORVASTATIN CALCIUM 40 MG/1
40 TABLET, FILM COATED ORAL DAILY
Status: DISCONTINUED | OUTPATIENT
Start: 2025-07-08 | End: 2025-07-10 | Stop reason: HOSPADM

## 2025-07-08 RX ORDER — CEFTRIAXONE 2 G/50ML
2 INJECTION, SOLUTION INTRAVENOUS EVERY 24 HOURS
Status: DISCONTINUED | OUTPATIENT
Start: 2025-07-09 | End: 2025-07-10 | Stop reason: HOSPADM

## 2025-07-08 RX ORDER — LEVOTHYROXINE SODIUM 50 UG/1
50 TABLET ORAL DAILY
Status: DISCONTINUED | OUTPATIENT
Start: 2025-07-09 | End: 2025-07-10 | Stop reason: HOSPADM

## 2025-07-08 RX ORDER — IPRATROPIUM BROMIDE AND ALBUTEROL SULFATE 2.5; .5 MG/3ML; MG/3ML
3 SOLUTION RESPIRATORY (INHALATION)
Status: DISCONTINUED | OUTPATIENT
Start: 2025-07-08 | End: 2025-07-10

## 2025-07-08 RX ORDER — HEPARIN SODIUM 5000 [USP'U]/ML
5000 INJECTION, SOLUTION INTRAVENOUS; SUBCUTANEOUS EVERY 8 HOURS
Status: DISCONTINUED | OUTPATIENT
Start: 2025-07-08 | End: 2025-07-10 | Stop reason: HOSPADM

## 2025-07-08 RX ORDER — CEFTRIAXONE 2 G/50ML
2 INJECTION, SOLUTION INTRAVENOUS ONCE
Status: COMPLETED | OUTPATIENT
Start: 2025-07-08 | End: 2025-07-08

## 2025-07-08 RX ORDER — PREDNISONE 20 MG/1
20 TABLET ORAL ONCE
Status: COMPLETED | OUTPATIENT
Start: 2025-07-08 | End: 2025-07-08

## 2025-07-08 RX ADMIN — IPRATROPIUM BROMIDE AND ALBUTEROL SULFATE 3 ML: 2.5; .5 SOLUTION RESPIRATORY (INHALATION) at 16:53

## 2025-07-08 RX ADMIN — GUAIFENESIN 1200 MG: 600 TABLET ORAL at 20:29

## 2025-07-08 RX ADMIN — DEXTROSE, SODIUM CHLORIDE, SODIUM LACTATE, POTASSIUM CHLORIDE, AND CALCIUM CHLORIDE 75 ML/HR: 5; .6; .31; .03; .02 INJECTION, SOLUTION INTRAVENOUS at 09:26

## 2025-07-08 RX ADMIN — CEFTRIAXONE 2 G: 2 INJECTION, SOLUTION INTRAVENOUS at 10:46

## 2025-07-08 RX ADMIN — SODIUM CHLORIDE 100 ML/HR: 0.9 INJECTION, SOLUTION INTRAVENOUS at 10:45

## 2025-07-08 RX ADMIN — FLUTICASONE PROPIONATE 1 SPRAY: 50 SPRAY, METERED NASAL at 14:28

## 2025-07-08 RX ADMIN — PREDNISONE 20 MG: 20 TABLET ORAL at 14:27

## 2025-07-08 RX ADMIN — IPRATROPIUM BROMIDE AND ALBUTEROL SULFATE 3 ML: 2.5; .5 SOLUTION RESPIRATORY (INHALATION) at 20:47

## 2025-07-08 RX ADMIN — ATORVASTATIN CALCIUM 40 MG: 40 TABLET, FILM COATED ORAL at 20:29

## 2025-07-08 RX ADMIN — BENZONATATE 100 MG: 100 CAPSULE ORAL at 16:35

## 2025-07-08 RX ADMIN — BENZONATATE 100 MG: 100 CAPSULE ORAL at 20:29

## 2025-07-08 RX ADMIN — PREDNISONE 20 MG: 20 TABLET ORAL at 09:43

## 2025-07-08 RX ADMIN — SODIUM CHLORIDE 1000 ML: 0.9 INJECTION, SOLUTION INTRAVENOUS at 06:44

## 2025-07-08 RX ADMIN — CARBAMIDE PEROXIDE 10 DROP: 65 SOLUTION/ DROPS TOPICAL at 14:27

## 2025-07-08 RX ADMIN — CARBAMIDE PEROXIDE 10 DROP: 65 SOLUTION/ DROPS TOPICAL at 20:30

## 2025-07-08 RX ADMIN — ASPIRIN 81 MG CHEWABLE TABLET 81 MG: 81 TABLET CHEWABLE at 14:27

## 2025-07-08 RX ADMIN — IOHEXOL 75 ML: 350 INJECTION, SOLUTION INTRAVENOUS at 08:28

## 2025-07-08 RX ADMIN — AZITHROMYCIN MONOHYDRATE 500 MG: 500 INJECTION, POWDER, LYOPHILIZED, FOR SOLUTION INTRAVENOUS at 09:37

## 2025-07-08 RX ADMIN — HEPARIN SODIUM 5000 UNITS: 5000 INJECTION, SOLUTION INTRAVENOUS; SUBCUTANEOUS at 14:27

## 2025-07-08 SDOH — SOCIAL STABILITY: SOCIAL INSECURITY: DO YOU FEEL ANYONE HAS EXPLOITED OR TAKEN ADVANTAGE OF YOU FINANCIALLY OR OF YOUR PERSONAL PROPERTY?: NO

## 2025-07-08 SDOH — SOCIAL STABILITY: SOCIAL INSECURITY
WITHIN THE LAST YEAR, HAVE YOU BEEN RAPED OR FORCED TO HAVE ANY KIND OF SEXUAL ACTIVITY BY YOUR PARTNER OR EX-PARTNER?: NO

## 2025-07-08 SDOH — SOCIAL STABILITY: SOCIAL INSECURITY: WITHIN THE LAST YEAR, HAVE YOU BEEN AFRAID OF YOUR PARTNER OR EX-PARTNER?: NO

## 2025-07-08 SDOH — SOCIAL STABILITY: SOCIAL INSECURITY
WITHIN THE LAST YEAR, HAVE YOU BEEN KICKED, HIT, SLAPPED, OR OTHERWISE PHYSICALLY HURT BY YOUR PARTNER OR EX-PARTNER?: NO

## 2025-07-08 SDOH — SOCIAL STABILITY: SOCIAL INSECURITY: HAS ANYONE EVER THREATENED TO HURT YOUR FAMILY OR YOUR PETS?: NO

## 2025-07-08 SDOH — ECONOMIC STABILITY: INCOME INSECURITY: IN THE PAST 12 MONTHS HAS THE ELECTRIC, GAS, OIL, OR WATER COMPANY THREATENED TO SHUT OFF SERVICES IN YOUR HOME?: NO

## 2025-07-08 SDOH — ECONOMIC STABILITY: FOOD INSECURITY: WITHIN THE PAST 12 MONTHS, THE FOOD YOU BOUGHT JUST DIDN'T LAST AND YOU DIDN'T HAVE MONEY TO GET MORE.: NEVER TRUE

## 2025-07-08 SDOH — ECONOMIC STABILITY: HOUSING INSECURITY: IN THE LAST 12 MONTHS, WAS THERE A TIME WHEN YOU WERE NOT ABLE TO PAY THE MORTGAGE OR RENT ON TIME?: NO

## 2025-07-08 SDOH — SOCIAL STABILITY: SOCIAL INSECURITY: WERE YOU ABLE TO COMPLETE ALL THE BEHAVIORAL HEALTH SCREENINGS?: YES

## 2025-07-08 SDOH — ECONOMIC STABILITY: HOUSING INSECURITY: IN THE PAST 12 MONTHS, HOW MANY TIMES HAVE YOU MOVED WHERE YOU WERE LIVING?: 0

## 2025-07-08 SDOH — ECONOMIC STABILITY: FOOD INSECURITY: WITHIN THE PAST 12 MONTHS, YOU WORRIED THAT YOUR FOOD WOULD RUN OUT BEFORE YOU GOT THE MONEY TO BUY MORE.: NEVER TRUE

## 2025-07-08 SDOH — SOCIAL STABILITY: SOCIAL INSECURITY: WITHIN THE LAST YEAR, HAVE YOU BEEN HUMILIATED OR EMOTIONALLY ABUSED IN OTHER WAYS BY YOUR PARTNER OR EX-PARTNER?: NO

## 2025-07-08 SDOH — ECONOMIC STABILITY: TRANSPORTATION INSECURITY: IN THE PAST 12 MONTHS, HAS LACK OF TRANSPORTATION KEPT YOU FROM MEDICAL APPOINTMENTS OR FROM GETTING MEDICATIONS?: NO

## 2025-07-08 SDOH — ECONOMIC STABILITY: FOOD INSECURITY: HOW HARD IS IT FOR YOU TO PAY FOR THE VERY BASICS LIKE FOOD, HOUSING, MEDICAL CARE, AND HEATING?: NOT HARD AT ALL

## 2025-07-08 SDOH — ECONOMIC STABILITY: HOUSING INSECURITY: AT ANY TIME IN THE PAST 12 MONTHS, WERE YOU HOMELESS OR LIVING IN A SHELTER (INCLUDING NOW)?: NO

## 2025-07-08 SDOH — SOCIAL STABILITY: SOCIAL INSECURITY: ARE YOU OR HAVE YOU BEEN THREATENED OR ABUSED PHYSICALLY, EMOTIONALLY, OR SEXUALLY BY ANYONE?: NO

## 2025-07-08 SDOH — SOCIAL STABILITY: SOCIAL INSECURITY: DO YOU FEEL UNSAFE GOING BACK TO THE PLACE WHERE YOU ARE LIVING?: NO

## 2025-07-08 SDOH — SOCIAL STABILITY: SOCIAL INSECURITY: HAVE YOU HAD ANY THOUGHTS OF HARMING ANYONE ELSE?: NO

## 2025-07-08 SDOH — SOCIAL STABILITY: SOCIAL INSECURITY: ARE THERE ANY APPARENT SIGNS OF INJURIES/BEHAVIORS THAT COULD BE RELATED TO ABUSE/NEGLECT?: NO

## 2025-07-08 SDOH — SOCIAL STABILITY: SOCIAL INSECURITY: HAVE YOU HAD THOUGHTS OF HARMING ANYONE ELSE?: NO

## 2025-07-08 SDOH — SOCIAL STABILITY: SOCIAL INSECURITY: DOES ANYONE TRY TO KEEP YOU FROM HAVING/CONTACTING OTHER FRIENDS OR DOING THINGS OUTSIDE YOUR HOME?: NO

## 2025-07-08 SDOH — SOCIAL STABILITY: SOCIAL INSECURITY: ABUSE: ADULT

## 2025-07-08 ASSESSMENT — COGNITIVE AND FUNCTIONAL STATUS - GENERAL
PATIENT BASELINE BEDBOUND: NO
HELP NEEDED FOR BATHING: A LITTLE
EATING MEALS: A LITTLE
DRESSING REGULAR UPPER BODY CLOTHING: A LITTLE
STANDING UP FROM CHAIR USING ARMS: A LITTLE
MOBILITY SCORE: 17
PERSONAL GROOMING: A LITTLE
DRESSING REGULAR LOWER BODY CLOTHING: A LITTLE
DAILY ACTIVITIY SCORE: 18
WALKING IN HOSPITAL ROOM: A LOT
TOILETING: A LITTLE
CLIMB 3 TO 5 STEPS WITH RAILING: TOTAL
MOVING TO AND FROM BED TO CHAIR: A LITTLE

## 2025-07-08 ASSESSMENT — LIFESTYLE VARIABLES
HAVE YOU EVER FELT YOU SHOULD CUT DOWN ON YOUR DRINKING: NO
AUDIT-C TOTAL SCORE: 1
HOW OFTEN DO YOU HAVE 6 OR MORE DRINKS ON ONE OCCASION: NEVER
SKIP TO QUESTIONS 9-10: 1
HAVE PEOPLE ANNOYED YOU BY CRITICIZING YOUR DRINKING: NO
EVER FELT BAD OR GUILTY ABOUT YOUR DRINKING: NO
EVER HAD A DRINK FIRST THING IN THE MORNING TO STEADY YOUR NERVES TO GET RID OF A HANGOVER: NO
AUDIT-C TOTAL SCORE: 1
TOTAL SCORE: 0
HOW MANY STANDARD DRINKS CONTAINING ALCOHOL DO YOU HAVE ON A TYPICAL DAY: 1 OR 2
HOW OFTEN DO YOU HAVE A DRINK CONTAINING ALCOHOL: MONTHLY OR LESS

## 2025-07-08 ASSESSMENT — ACTIVITIES OF DAILY LIVING (ADL)
DRESSING YOURSELF: INDEPENDENT
TOILETING: INDEPENDENT
FEEDING YOURSELF: INDEPENDENT
HEARING - RIGHT EAR: FUNCTIONAL
LACK_OF_TRANSPORTATION: NO
JUDGMENT_ADEQUATE_SAFELY_COMPLETE_DAILY_ACTIVITIES: YES
PATIENT'S MEMORY ADEQUATE TO SAFELY COMPLETE DAILY ACTIVITIES?: YES
GROOMING: INDEPENDENT
BATHING: INDEPENDENT
WALKS IN HOME: NEEDS ASSISTANCE
LACK_OF_TRANSPORTATION: NO
ADEQUATE_TO_COMPLETE_ADL: YES
HEARING - LEFT EAR: FUNCTIONAL

## 2025-07-08 ASSESSMENT — PAIN SCALES - GENERAL
PAINLEVEL_OUTOF10: 0 - NO PAIN
PAINLEVEL_OUTOF10: 5 - MODERATE PAIN
PAINLEVEL_OUTOF10: 0 - NO PAIN

## 2025-07-08 ASSESSMENT — PAIN DESCRIPTION - DESCRIPTORS
DESCRIPTORS: ACHING;DULL
DESCRIPTORS: SORE

## 2025-07-08 ASSESSMENT — PATIENT HEALTH QUESTIONNAIRE - PHQ9
1. LITTLE INTEREST OR PLEASURE IN DOING THINGS: NOT AT ALL
SUM OF ALL RESPONSES TO PHQ9 QUESTIONS 1 & 2: 0
2. FEELING DOWN, DEPRESSED OR HOPELESS: NOT AT ALL

## 2025-07-08 ASSESSMENT — PAIN - FUNCTIONAL ASSESSMENT
PAIN_FUNCTIONAL_ASSESSMENT: 0-10
PAIN_FUNCTIONAL_ASSESSMENT: 0-10

## 2025-07-08 ASSESSMENT — PAIN DESCRIPTION - FREQUENCY: FREQUENCY: INTERMITTENT

## 2025-07-08 ASSESSMENT — PAIN DESCRIPTION - LOCATION: LOCATION: WRIST

## 2025-07-08 ASSESSMENT — PAIN DESCRIPTION - ORIENTATION: ORIENTATION: LEFT

## 2025-07-08 ASSESSMENT — PAIN DESCRIPTION - PROGRESSION: CLINICAL_PROGRESSION: GRADUALLY WORSENING

## 2025-07-08 ASSESSMENT — PAIN DESCRIPTION - ONSET: ONSET: ONGOING

## 2025-07-08 ASSESSMENT — PAIN DESCRIPTION - PAIN TYPE: TYPE: ACUTE PAIN

## 2025-07-08 NOTE — NURSING NOTE
Pt resting in bed, consistent non productive cough noted, order received for and pt medicated with tessalon andra.

## 2025-07-08 NOTE — H&P
History Of Present Illness  Adrian Rosa is a 69 y.o. male with past medical history of prediabetes, hypothyroidism, hyperlipidemia, Jama's disease, BPH, spinal stenosis presented for abdominal pain and bilateral ear pain.  Patient states that he was here with chills and difficulty hearing this started about 5 days ago.  Patient was seen in the emergency department and was discharged home.  Today he presented to emergency department due to weakness and inability to eat with bilateral ear pressure.  Patient feels that he has ear infection and tried over-the-counter medication without any improvement.  Workup here in the emergency department was remarkable for elevated troponin that are downtrending from 55-45.  Lipase was normal at 7.  Sodium was low at 126 with patient having some starvation ketoacidosis.  Flu and COVID testing were negative.  CT abdomen pelvis was performed which revealed moderate-sized fat stranding right inguinal hernia without significant associated inflammation and partially visualized right lower lobe consolidation opacity concerning for aspiration pneumonia in the appropriate clinical settings.  This led to chest x-ray being done which showed right suprahilar infiltrate consistent with pneumonia and patient was started on antibiotics and referred to hospitalist service for further management.     Past Medical History  He has a past medical history of Encounter for issue of repeat prescription (12/03/2020), Hypertension, Personal history of other endocrine, nutritional and metabolic disease (11/30/2020), and Prediabetes.    Surgical History  He has a past surgical history that includes Other surgical history (08/15/2022).     Social History  He reports that he quit smoking about 50 years ago. His smoking use included cigarettes. He has never used smokeless tobacco. He reports current alcohol use. He reports that he does not use drugs.    Family History  Family History[1]      Allergies  Penicillins, Povidone-iodine, and Streptomycin    Scheduled medications  Scheduled Medications[2]  Continuous medications  Continuous Medications[3]  PRN medications  PRN Medications[4]      Physical Exam:  General: Alert, mild distress on room air.  Difficulty hearing  HEENT: PERRLA, head intact and normocephalic.  Dry mucous membrane  Neck: Normal to inspection  Lungs: Lungs are diminished in both bases.  Cardiac: Regular rate and rhythm  Abdomen: Soft nontender, positive bowel sounds  : Exam deferred  Skin: Intact  Hematology: No petechia or excessive ecchymosis  Musculoskeletal: Without significant trauma.  Weak overall  Neurological: Alert awake oriented, no focal deficit, cranial nerves grossly intact  Psych: No suicidal ideation or homicidal ideation     Last Recorded Vitals  /68   Pulse 73   Temp 36.4 °C (97.5 °F) (Tympanic)   Resp 18   Wt 99.8 kg (220 lb)   SpO2 98%     Relevant Results  Imaging  XR chest 2 views  Result Date: 7/8/2025  Small nodular right suprahilar infiltrate consistent with pneumonia. Follow-up to ensure resolution after appropriate treatment recommended.   MACRO: None.   Signed by: Khalida Wagner 7/8/2025 9:36 AM Dictation workstation:   [id number]    CT abdomen pelvis w IV contrast  Result Date: 7/8/2025  1. Moderate-sized fat containing right inguinal hernia without significant associated inflammation. 2. Partially visualized right lower lobe consolidative opacities concerning for aspiration and/or pneumonia in the appropriate clinical setting.       MACRO: None   Signed by: Milad Pastor 7/8/2025 9:02 AM Dictation workstation:   [id number]    XR chest 2 views  Result Date: 7/2/2025  DJD in the thoracic spine as described.   Otherwise, negative exam.   MACRO: None   Signed by: Homero Choudhary 7/2/2025 9:41 AM Dictation workstation:   [id number]      Cardiology, Vascular, and Other Imaging  ECG 12 lead  Result Date: 7/8/2025  Normal sinus rhythm Low voltage  QRS Septal infarct , age undetermined Abnormal ECG When compared with ECG of 02-JUL-2025 09:13, (unconfirmed) Septal infarct is now Present    ECG 12 lead  Result Date: 7/3/2025  Normal sinus rhythm Normal ECG When compared with ECG of 27-DEC-2023 14:41, No significant change was found       Results for orders placed or performed during the hospital encounter of 07/08/25 (from the past 24 hours)   Lipase   Result Value Ref Range    Lipase 7 (L) 9 - 82 U/L   Comprehensive metabolic panel   Result Value Ref Range    Glucose 114 (H) 74 - 99 mg/dL    Sodium 126 (L) 136 - 145 mmol/L    Potassium 4.0 3.5 - 5.3 mmol/L    Chloride 94 (L) 98 - 107 mmol/L    Bicarbonate 19 (L) 21 - 32 mmol/L    Anion Gap 17 mmol/L    Urea Nitrogen 23 6 - 23 mg/dL    Creatinine 1.19 0.50 - 1.30 mg/dL    eGFR 66 >60 mL/min/1.73m*2    Calcium 8.7 8.6 - 10.3 mg/dL    Albumin 3.3 (L) 3.4 - 5.0 g/dL    Alkaline Phosphatase 65 33 - 136 U/L    Total Protein 8.1 6.4 - 8.2 g/dL    AST 39 9 - 39 U/L    Bilirubin, Total 1.0 0.0 - 1.2 mg/dL    ALT 27 10 - 52 U/L   CBC and Auto Differential   Result Value Ref Range    WBC 12.5 (H) 4.4 - 11.3 x10*3/uL    nRBC 0.0 0.0 - 0.0 /100 WBCs    RBC 4.30 (L) 4.50 - 5.90 x10*6/uL    Hemoglobin 13.3 (L) 13.5 - 17.5 g/dL    Hematocrit 37.4 (L) 41.0 - 52.0 %    MCV 87 80 - 100 fL    MCH 30.9 26.0 - 34.0 pg    MCHC 35.6 32.0 - 36.0 g/dL    RDW 12.1 11.5 - 14.5 %    Platelets 353 150 - 450 x10*3/uL    Neutrophils % 69.4 40.0 - 80.0 %    Immature Granulocytes %, Automated 0.6 0.0 - 0.9 %    Lymphocytes % 19.8 13.0 - 44.0 %    Monocytes % 9.0 2.0 - 10.0 %    Eosinophils % 1.0 0.0 - 6.0 %    Basophils % 0.2 0.0 - 2.0 %    Neutrophils Absolute 8.69 (H) 1.20 - 7.70 x10*3/uL    Immature Granulocytes Absolute, Automated 0.07 0.00 - 0.70 x10*3/uL    Lymphocytes Absolute 2.47 1.20 - 4.80 x10*3/uL    Monocytes Absolute 1.12 (H) 0.10 - 1.00 x10*3/uL    Eosinophils Absolute 0.12 0.00 - 0.70 x10*3/uL    Basophils Absolute 0.03 0.00 -  0.10 x10*3/uL   Beta Hydroxybutyrate   Result Value Ref Range    Beta-Hydroxybutyrate 1.60 (H) 0.02 - 0.27 mmol/L   Troponin I, High Sensitivity   Result Value Ref Range    Troponin I, High Sensitivity 55 (HH) 0 - 20 ng/L   Phosphorus   Result Value Ref Range    Phosphorus 2.9 2.5 - 4.9 mg/dL   Magnesium   Result Value Ref Range    Magnesium 1.79 1.60 - 2.40 mg/dL   Sars-CoV-2 and Influenza A/B PCR   Result Value Ref Range    Flu A Result Not Detected Not Detected    Flu B Result Not Detected Not Detected    Coronavirus 2019, PCR Not Detected Not Detected   ECG 12 lead   Result Value Ref Range    Ventricular Rate 71 BPM    Atrial Rate 71 BPM    MO Interval 164 ms    QRS Duration 82 ms    QT Interval 416 ms    QTC Calculation(Bazett) 452 ms    P Axis 69 degrees    R Axis 45 degrees    T Axis 35 degrees    QRS Count 11 beats    Q Onset 222 ms    P Onset 140 ms    P Offset 205 ms    T Offset 430 ms    QTC Fredericia 440 ms   Troponin I, High Sensitivity   Result Value Ref Range    Troponin I, High Sensitivity 45 (H) 0 - 20 ng/L        Assessment/Plan   Adrian Rosa is a 69 y.o. male with past medical history of prediabetes, hypothyroidism, hyperlipidemia, Platter's disease, BPH, spinal stenosis presented for abdominal pain and bilateral ear pain.  Patient was worked up and was found to have hyponatremia and community-acquired pneumonia of right side causing his symptoms and was admitted for that.  Assessment & Plan  Hyponatremia  Community acquired pneumonia  Starvation ketoacidosis  Patient with generalized weakness secondary to community-acquired pneumonia  Recently had bilateral ear infection  Will continue with ceftriaxone and azithromycin and change antibiotics depending on the culture and susceptibility  IV fluids with 1 L of normal saline  Hyponatremia is likely hypovolemic  Anticipate improvement with patient eating and drinking  Elevated troponin  Likely secondary to demand  No chest pain  Troponin is  downtrending  Will treat the underlying illness  Aspirin 81 mg daily  [place]'s disease (Multi)  Patient profoundly weak and was missing some of the doses of his prednisone 5 mg  Will start patient on prednisone 40 mg daily for 5 days total  Hyperlipidemia  Atorvastatin  Prediabetes  Last hemoglobin A1c about a year ago was 5.8  Monitor with steroids  If persistently high will add coverage with sliding scale insulin  Poor appetite  Secondary to above illness  Will continue to monitor  Generalized weakness  PT OT evaluation  Fall precautions       Plan discussed with patient at bedside in ED room 12  Full code  High level of MDM based on above issue and discussing plan    This note is created using voice recognition software. All efforts are made to minimize errors, if there are errors there due to transcription.    Aston Razo  Hospitalist           [1]   Family History  Problem Relation Name Age of Onset    Ovarian cancer Mother      Peripheral vascular disease Mother      COPD Mother      Lung cancer Mother      Heart attack Mother      COPD Father      Heart failure Father      Heart attack Father      Heart attack Brother     [2] atorvastatin, 40 mg, oral, Daily  azithromycin, 500 mg, intravenous, Once  carbamide peroxide, 10 drop, Each Ear, BID  cefTRIAXone, 2 g, intravenous, Once  fluticasone, 1 spray, Each Nostril, Daily  [START ON 7/9/2025] levothyroxine, 50 mcg, oral, Daily before breakfast    [3] dextrose 5 % and lactated Ringer's, 75 mL/hr, Last Rate: 75 mL/hr (07/08/25 0926)    [4]

## 2025-07-08 NOTE — CARE PLAN
Problem: Pain - Adult  Goal: Verbalizes/displays adequate comfort level or baseline comfort level  Outcome: Progressing     Problem: Safety - Adult  Goal: Free from fall injury  Outcome: Progressing     Problem: Discharge Planning  Goal: Discharge to home or other facility with appropriate resources  Outcome: Progressing     Problem: Chronic Conditions and Co-morbidities  Goal: Patient's chronic conditions and co-morbidity symptoms are monitored and maintained or improved  Outcome: Progressing     Problem: Nutrition  Goal: Nutrient intake appropriate for maintaining nutritional needs  Outcome: Progressing     Problem: Fall/Injury  Goal: Not fall by end of shift  Outcome: Progressing  Goal: Be free from injury by end of the shift  Outcome: Progressing  Goal: Verbalize understanding of personal risk factors for fall in the hospital  Outcome: Progressing  Goal: Verbalize understanding of risk factor reduction measures to prevent injury from fall in the home  Outcome: Progressing  Goal: Use assistive devices by end of the shift  Outcome: Progressing  Goal: Pace activities to prevent fatigue by end of the shift  Outcome: Progressing     Problem: Diabetes  Goal: Achieve decreasing blood glucose levels by end of shift  Outcome: Progressing  Goal: Increase stability of blood glucose readings by end of shift  Outcome: Progressing  Goal: Decrease in ketones present in urine by end of shift  Outcome: Progressing  Goal: Maintain electrolyte levels within acceptable range throughout shift  Outcome: Progressing  Goal: Maintain glucose levels >70mg/dl to <250mg/dl throughout shift  Outcome: Progressing  Goal: No changes in neurological exam by end of shift  Outcome: Progressing  Goal: Learn about and adhere to nutrition recommendations by end of shift  Outcome: Progressing  Goal: Vital signs within normal range for age by end of shift  Outcome: Progressing  Goal: Increase self care and/or family involovement by end of  shift  Outcome: Progressing  Goal: Receive DSME education by end of shift  Outcome: Progressing

## 2025-07-08 NOTE — ED PROVIDER NOTES
Emergency Department Provider Note       EMERGENCY DEPARTMENT ENCOUNTER      Pt Name: Adrian Rosa  MRN: [id number]  Birthdate 1955  Date of evaluation: 7/8/2025    HISTORY OF PRESENT ILLNESS    Adrian Rosa is an 69 y.o. male with complaint of weakness unable to eat.  Still with bilateral ear pressure and discomfort.  Was evaluated 5 days ago or so with similar symptoms.  Given some over-the-counter meds with no improvement.  Has only eaten 1 salad in the last 5 days.  Has not had the energy to make anything else and has not ordered any food has been drinking fluids and urinating fine.  Denies any diarrhea.  Denied any abdominal pain for me.  Bothered by the ear pressure bilaterally.    PAST MEDICAL HISTORY   Medical History[1]    SURGICAL HISTORY     Surgical History[2]    CURRENT MEDICATIONS       Current Discharge Medication List        CONTINUE these medications which have NOT CHANGED    Details   fluticasone (Flonase) 50 mcg/actuation nasal spray Administer 1 spray into each nostril once daily. Shake gently. Before first use, prime pump. After use, clean tip and replace cap.  Qty: 16 g, Refills: 0    Associated Diagnoses: Upper respiratory tract infection, unspecified type      ibuprofen 600 mg tablet Take 1 tablet (600 mg) by mouth every 6 hours if needed for mild pain (1 - 3) for up to 7 days.  Qty: 20 tablet, Refills: 0    Associated Diagnoses: Acute pharyngitis, unspecified etiology      predniSONE (Deltasone) 5 mg tablet Take 1 tablet (5 mg) by mouth once daily.  Qty: 90 tablet, Refills: 3    Associated Diagnoses: Brule's disease (Multi)      atorvastatin (Lipitor) 40 mg tablet TAKE 1 TABLET BY MOUTH EVERY DAY  Qty: 90 tablet, Refills: 1    Associated Diagnoses: Hyperlipidemia, unspecified hyperlipidemia type      benzocaine-menthol (Cepacol Sore Throat, johanna-men,) lozenge Dissolve 1 lozenge in the mouth every 2 hours if needed for sore throat for up to 5 days.  Qty: 20 lozenge, Refills: 0     Associated Diagnoses: Acute pharyngitis, unspecified etiology      levothyroxine (Synthroid, Levoxyl) 50 mcg tablet Take 1 tablet (50 mcg) by mouth once daily in the morning. Take before meals.  Qty: 90 tablet, Refills: 3    Associated Diagnoses: Hypothyroidism, unspecified type      lidocaine (Lidoderm) 5 % patch Place 1 patch over 12 hours on the skin once daily. Place patch for 12 hours, then remove for 12 hours.  Qty: 10 patch, Refills: 0    Associated Diagnoses: Acute right-sided low back pain without sciatica             ALLERGIES     Penicillins, Povidone-iodine, and Streptomycin    FAMILY HISTORY     Family History[3]     SOCIAL HISTORY     Social History[4]    PHYSICAL EXAM       ED Triage Vitals [07/08/25 0625]   Temperature Heart Rate Respirations BP   36.4 °C (97.5 °F) 72 18 171/79      Pulse Ox Temp Source Heart Rate Source Patient Position   98 % Tympanic Monitor Sitting      BP Location FiO2 (%)     Right arm --       Physical Exam  Vitals and nursing note reviewed.   Constitutional:       General: He is not in acute distress.     Appearance: He is well-developed. He is not ill-appearing, toxic-appearing or diaphoretic.   HENT:      Right Ear: External ear normal. A middle ear effusion is present. No mastoid tenderness. Tympanic membrane is erythematous and bulging.      Left Ear: External ear normal. A middle ear effusion is present. No mastoid tenderness. Tympanic membrane is erythematous and bulging.      Ears:      Comments: Bilateral bulging tympanic membranes with erythema much worse on the left.  Effusion bilaterally.  Canals normal.  Tragus nontender.  No mastoid pain     Mouth/Throat:      Mouth: Mucous membranes are moist.      Pharynx: Oropharynx is clear.   Cardiovascular:      Rate and Rhythm: Normal rate and regular rhythm.      Heart sounds: Normal heart sounds.   Pulmonary:      Effort: Pulmonary effort is normal.      Breath sounds: Normal breath sounds.   Abdominal:      General:  Abdomen is protuberant. Bowel sounds are normal.      Palpations: Abdomen is soft.      Tenderness: There is abdominal tenderness in the right lower quadrant. There is no right CVA tenderness, left CVA tenderness, guarding or rebound. Negative signs include Cadena's sign and McBurney's sign.   Neurological:      General: No focal deficit present.      Mental Status: He is alert and oriented to person, place, and time.      Motor: No weakness.   Psychiatric:         Mood and Affect: Mood normal.         Behavior: Behavior normal.          DIAGNOSTIC RESULTS     LABS:  Labs Reviewed   LIPASE - Abnormal       Result Value    Lipase 7 (*)     Narrative:     Venipuncture immediately after or during the administration of Metamizole may lead to falsely low results. Testing should be performed immediately prior to Metamizole dosing.   COMPREHENSIVE METABOLIC PANEL - Abnormal    Glucose 114 (*)     Sodium 126 (*)     Potassium 4.0      Chloride 94 (*)     Bicarbonate 19 (*)     Anion Gap 17      Urea Nitrogen 23      Creatinine 1.19      eGFR 66      Calcium 8.7      Albumin 3.3 (*)     Alkaline Phosphatase 65      Total Protein 8.1      AST 39      Bilirubin, Total 1.0      ALT 27     CBC WITH AUTO DIFFERENTIAL - Abnormal    WBC 12.5 (*)     nRBC 0.0      RBC 4.30 (*)     Hemoglobin 13.3 (*)     Hematocrit 37.4 (*)     MCV 87      MCH 30.9      MCHC 35.6      RDW 12.1      Platelets 353      Neutrophils % 69.4      Immature Granulocytes %, Automated 0.6      Lymphocytes % 19.8      Monocytes % 9.0      Eosinophils % 1.0      Basophils % 0.2      Neutrophils Absolute 8.69 (*)     Immature Granulocytes Absolute, Automated 0.07      Lymphocytes Absolute 2.47      Monocytes Absolute 1.12 (*)     Eosinophils Absolute 0.12      Basophils Absolute 0.03     URINALYSIS WITH REFLEX MICROSCOPIC - Abnormal    Color, Urine Yellow      Appearance, Urine Clear      Specific Gravity, Urine >1.050 (*)     pH, Urine 5.5      Protein, Urine  30 (1+) (*)     Glucose, Urine 150 (2+) (*)     Blood, Urine 0.1 (1+) (*)     Ketones, Urine 60 (2+) (*)     Bilirubin, Urine NEGATIVE      Urobilinogen, Urine Normal      Nitrite, Urine NEGATIVE      Leukocyte Esterase, Urine NEGATIVE     BETA HYDROXYBUTYRATE - Abnormal    Beta-Hydroxybutyrate 1.60 (*)     Narrative:     The beta-hydroxybutyrate test performance characteristics have been validated by  [facility]. This test has not been approved by the FDA; however,such approval is not necessary.     TROPONIN I, HIGH SENSITIVITY - Abnormal    Troponin I, High Sensitivity 55 (*)     Narrative:     Less than 99th percentile of normal range cutoff-  Female and children under 18 years old <14 ng/L; Male <21 ng/L: Negative  Repeat testing should be performed if clinically indicated.     Female and children under 18 years old 14-50 ng/L; Male 21-50 ng/L:  Consistent with possible cardiac damage and possible increased clinical   risk. Serial measurements may help to assess extent of myocardial damage.     >50 ng/L: Consistent with cardiac damage, increased clinical risk and  myocardial infarction. Serial measurements may help assess extent of   myocardial damage.      NOTE: Children less than 1 year old may have higher baseline troponin   levels and results should be interpreted in conjunction with the overall   clinical context.     NOTE: Troponin I testing is performed using a different   testing methodology at [facility] than at other   [facility]. Direct result comparisons should only   be made within the same method.   TROPONIN I, HIGH SENSITIVITY - Abnormal    Troponin I, High Sensitivity 45 (*)     Narrative:     Less than 99th percentile of normal range cutoff-  Female and children under 18 years old <14 ng/L; Male <21 ng/L: Negative  Repeat testing should be performed if clinically indicated.     Female and children under 18 years old 14-50 ng/L;  Male 21-50 ng/L:  Consistent with possible cardiac damage and possible increased clinical   risk. Serial measurements may help to assess extent of myocardial damage.     >50 ng/L: Consistent with cardiac damage, increased clinical risk and  myocardial infarction. Serial measurements may help assess extent of   myocardial damage.      NOTE: Children less than 1 year old may have higher baseline troponin   levels and results should be interpreted in conjunction with the overall   clinical context.     NOTE: Troponin I testing is performed using a different   testing methodology at [facility] than at other   [facility]. Direct result comparisons should only   be made within the same method.   PROCALCITONIN - Abnormal    Procalcitonin 0.53 (*)     Narrative:     Procalcitonin (PCT) results measured serially can  aid in decision-making for antibiotic discontinuation in  patients with suspected or confirmed sepsis in conjunction  with additional clinical information. Antibiotic  discontinuation may be considered with a change in PCT of  >80% from the peak result or when PCT falls below 0.50 ng/mL.    Procalcitonin results should not be used in isolation but  should be interpreted in conjunction with additional clinical  and laboratory findings. Procalcitonin results should not be  used to guide the initiation of antibiotic therapy.    Falsely low PCT values in the presence of bacterial infection  may occur in early infection, with atypical pathogens,  localized infections, and subacute infectious endocarditis.    Falsely elevated results outside of severe bacterial  infection/sepsis may be seen in patients with renal failure  or insufficiency, severe trauma or burns, recent major  abdominal/cardiac surgery, acute multi-organ failure, rarely  in patients with medullary thyroid carcinoma and rare  neuroendocrine tumors, and non-specific interfering antibodies  (heterophile antibodies, rheumatoid factor,  human anti-mouse  antibodies (HAMA), etc).    Performance of the PCT test in pediatric patients (<17yo),  pregnant women, immunocompromised patients, and patients on  immunomodulatory medications has not been evaluated.   MICROSCOPIC ONLY, URINE - Abnormal    WBC, Urine 1-5      RBC, Urine 6-10 (*)     Budding Yeast, Urine PRESENT (*)     Mucus, Urine 2+     POCT GLUCOSE - Abnormal    POCT Glucose 154 (*)    POCT GLUCOSE - Abnormal    POCT Glucose 226 (*)    POCT GLUCOSE - Abnormal    POCT Glucose 239 (*)    SARS-COV-2 AND INFLUENZA A/B PCR - Normal    Flu A Result Not Detected      Flu B Result Not Detected      Coronavirus 2019, PCR Not Detected      Narrative:     This assay is an FDA-cleared, in vitro diagnostic nucleic acid amplification test for the qualitative detection and differentiation of SARS CoV-2/ Influenza A/B from nasopharyngeal specimens collected from individuals with signs and symptoms of respiratory tract infections, and has been validated for use at [facility]. Negative results do not preclude COVID-19/ Influenza A/B infections and should not be used as the sole basis for diagnosis, treatment, or other management decisions. Testing for SARS CoV-2 is recommended only for patients who meet current clinical and/or epidemiological criteria defined by federal, state, or local public health directives.   PHOSPHORUS - Normal    Phosphorus 2.9     MAGNESIUM - Normal    Magnesium 1.79     RESPIRATORY CULTURE/SMEAR   LEGIONELLA ANTIGEN, URINE   STREPTOCOCCUS PNEUMONIAE ANTIGEN, URINE   CBC WITH AUTO DIFFERENTIAL   COMPREHENSIVE METABOLIC PANEL       All other labs were within normal range or not returned as of this dictation.    Imaging  XR chest 2 views   Final Result   Small nodular right suprahilar infiltrate consistent with pneumonia.   Follow-up to ensure resolution after appropriate treatment   recommended.        MACRO:   None.        Signed by: Khalida Wagner 7/8/2025 9:36 AM    Dictation workstation:   [id number]      CT abdomen pelvis w IV contrast   Final Result   1. Moderate-sized fat containing right inguinal hernia without   significant associated inflammation.   2. Partially visualized right lower lobe consolidative opacities   concerning for aspiration and/or pneumonia in the appropriate   clinical setting.                  MACRO:   None        Signed by: Milad Pastor 7/8/2025 9:02 AM   Dictation workstation:   [id number]           Procedures  Procedures     EMERGENCY DEPARTMENT COURSE/MDM:   Medical Decision Making  Patient's exam shows bilateral panic membrane erythema worse on the left with middle ear effusion and injection.  Canals normal.  Cardiopulmonary exam normal.  Abdominal exam he was tender in the right lower quadrant but not specifically suggesting appendicitis.  Has been weak and unwell and not been able to eat much I will obtain some imaging to rule out appendicitis or colitis or other abnormalities.  Will provide some fluids as he did have some acute kidney injury last time.  I will be discussing case with my incoming partner who will assume care of patient.        ED Course as of 07/08/25 2230 Tue Jul 08, 2025   0704 Patient signed out to me at this time.  Diagnosed by the prior ED physician with otitis media.  Plan for oral antibiotics after CT scan if disposition appropriate for home. Has significant abdominal tenderness with anorexia over the last several days. [TL]   0728 Leukocytosis improving [TL]   0729 Positive anion gap acidosis.  Hyponatremia worsening.  Kidney function generally stable.  Decreased bicarb.  Already getting 1 L IV fluids. [TL]   0753 Comprehensive metabolic panel(!)  Acute on chronic hyponatremia, slightly diminished bicarb level, otherwise no acute electrolyte or hepatorenal abnormality [CB]   0753 LIPASE(!): 7  Not greater than 3 times upper normal limit doubt pancreatitis [CB]   0753 Beta-Hydroxybutyrate(!): 1.60  Minimally  elevated suggestive of poor intake, not greater than [CB]   0754 Suspect starvation ketoacidosis, will start on D5 LR. [TL]   0805 EKG performed at 08: 01 and independently reviewed by provider: Reveals NSR with a rate of 71 bpm, normal axis, normal intervals, no ST changes, no T wave abnormalities, no ectopy. No STEMI.  Overall low voltage. [TL]   0822 Coronavirus 2019, PCR: Not Detected [CB]   0822 Flu B Result: Not Detected [CB]   0822 Flu A Result: Not Detected [CB]   0840 Troponin I, High Sensitivity(!!): 55  Elevated, will repeat [CB]   0914 IMPRESSION:  1. Moderate-sized fat containing right inguinal hernia without  significant associated inflammation.  2. Partially visualized right lower lobe consolidative opacities  concerning for aspiration and/or pneumonia in the appropriate  clinical setting.       [TL]   0917 CT abdomen pelvis w IV contrast  1. Moderate-sized fat containing right inguinal hernia without  significant associated inflammation.  2. Partially visualized right lower lobe consolidative opacities  concerning for aspiration and/or pneumonia in the appropriate  clinical setting.   [CB]   0925 Chest Radiograph interpretation: No acute cardiopulmonary process.  No pneumothorax, widened mediastinum, pneumonia. [TL]   0930 Shared decision making for disposition  Patient and/or patient´s representative was counseled regarding labs, imaging, likely diagnosis. All questions were answered. Recommendation was made   for Admission given the need for further escalation of care to inpatient management. Patient agreed and was admitted in stable condition. Admitting team was notified of any pending labs or imaging to ensure continuity of care.    [CB]   0932 Have discussed with ED pharmacist will give 20 mg prednisone given known history of Jama's disease, hyponatremia in the setting of acute illness. [TL]   1007 Repeat trop downtrended. [TL]      ED Course User Index  [CB] Nicholas Nicole DO  [TL] Hayes  ARETHA Mooney DO         Diagnoses as of 07/08/25 2230   Hyponatremia   Increased anion gap metabolic acidosis   Starvation ketoacidosis   Pneumonia of right lung due to infectious organism, unspecified part of lung        External records reviewed: recent inpatient, clinic, and prior ED notes  Labs and Diagnostic imaging independently reviewed/interpreted by me.    Patient plan, care, lab results and imaging were all discussed with attending.    ED Medications administered this visit:    Medications   oxygen (O2) therapy (has no administration in time range)   ipratropium-albuteroL (Duo-Neb) 0.5-2.5 mg/3 mL nebulizer solution 3 mL (3 mL nebulization Given 7/8/25 2047)   heparin (porcine) injection 5,000 Units (5,000 Units subcutaneous Given 7/8/25 1427)   cefTRIAXone (Rocephin) 2 g in dextrose (iso) IV 50 mL (has no administration in time range)   azithromycin (Zithromax) 500 mg in dextrose 5%  mL (has no administration in time range)   predniSONE (Deltasone) tablet 40 mg (has no administration in time range)   carbamide peroxide (Debrox) 6.5 % otic solution 10 drop (10 drops Each Ear Given 7/8/25 2030)   atorvastatin (Lipitor) tablet 40 mg (40 mg oral Given 7/8/25 2029)   fluticasone (Flonase) nasal spray 1 spray (1 spray Each Nostril Given 7/8/25 1428)   levothyroxine (Synthroid, Levoxyl) tablet 50 mcg (has no administration in time range)   sodium chloride 0.9% infusion (0 mL/hr intravenous Stopped 7/8/25 2028)   aspirin chewable tablet 81 mg (81 mg oral Given 7/8/25 1427)   benzonatate (Tessalon) capsule 100 mg (100 mg oral Given 7/8/25 2029)   guaiFENesin (Mucinex) 12 hr tablet 1,200 mg (1,200 mg oral Given 7/8/25 2029)   ipratropium-albuteroL (Duo-Neb) 0.5-2.5 mg/3 mL nebulizer solution 3 mL (3 mL nebulization Given 7/8/25 1653)   sodium chloride 0.9 % bolus 1,000 mL (0 mL intravenous Stopped 7/8/25 0851)   iohexol (OMNIPaque) 350 mg iodine/mL solution 75 mL (75 mL intravenous Given 7/8/25 4929)    cefTRIAXone (Rocephin) 2 g in dextrose (iso) IV 50 mL (0 g intravenous Stopped 25 1149)   azithromycin (Zithromax) 500 mg in dextrose 5%  mL (0 mg intravenous Stopped 25 1043)   predniSONE (Deltasone) tablet 20 mg (20 mg oral Given 25 0943)   predniSONE (Deltasone) tablet 20 mg (20 mg oral Given 25 1427)     New Prescriptions from this visit:    Current Discharge Medication List          (Please note that portions of this note were completed with a voice recognition program.  Efforts were made to edit the dictations but occasionally words are mis-transcribed.)                                                           [1]   Past Medical History:  Diagnosis Date    Encounter for issue of repeat prescription 2020    Encounter for medication refill    Hypertension     Personal history of other endocrine, nutritional and metabolic disease 2020    History of Jama's disease    Prediabetes    [2]   Past Surgical History:  Procedure Laterality Date    OTHER SURGICAL HISTORY  08/15/2022    Cholecystectomy laparoscopic   [3]   Family History  Problem Relation Name Age of Onset    Ovarian cancer Mother      Peripheral vascular disease Mother      COPD Mother      Lung cancer Mother      Heart attack Mother      COPD Father      Heart failure Father      Heart attack Father      Heart attack Brother     [4]   Social History  Socioeconomic History    Marital status: Single   Tobacco Use    Smoking status: Former     Current packs/day: 0.00     Types: Cigarettes     Quit date:      Years since quittin.5    Smokeless tobacco: Never   Vaping Use    Vaping status: Never Used   Substance and Sexual Activity    Alcohol use: Yes     Comment: occasional / once a week to once a month.    Drug use: Never    Sexual activity: Defer     Social Drivers of Health     Financial Resource Strain: Low Risk  (2025)    Overall Financial Resource Strain (CARDIA)     Difficulty of Paying Living  Expenses: Not hard at all   Food Insecurity: No Food Insecurity (7/8/2025)    Hunger Vital Sign     Worried About Running Out of Food in the Last Year: Never true     Ran Out of Food in the Last Year: Never true   Transportation Needs: No Transportation Needs (7/8/2025)    PRAPARE - Transportation     Lack of Transportation (Medical): No     Lack of Transportation (Non-Medical): No   Intimate Partner Violence: Not At Risk (7/8/2025)    Humiliation, Afraid, Rape, and Kick questionnaire     Fear of Current or Ex-Partner: No     Emotionally Abused: No     Physically Abused: No     Sexually Abused: No   Housing Stability: Low Risk  (7/8/2025)    Housing Stability Vital Sign     Unable to Pay for Housing in the Last Year: No     Number of Times Moved in the Last Year: 0     Homeless in the Last Year: No        Taj Rey MD  07/08/25 0015

## 2025-07-08 NOTE — PROGRESS NOTES
"Emergency Medicine Transition of Care Note.    I received Adrian Rosa in signout from Dr. Martinez.  Please see the previous ED provider note for all HPI, PE and MDM up to the time of signout at 0700. This is in addition to the primary record.    In brief Adrian Rosa is an 69 y.o. male PMH [place]'s disease, hyperlipidemia, prediabetes, hypothyroidism, hyponatremia, gastritis, LUIS FERNANDO, BPH, spinal stenosis, presenting for   Chief Complaint   Patient presents with    Abdominal Pain     Pt states, \"I have only ate a salad in 6 days, I live a lone I just don't have the energy to make food for my self\". \" I continue to have right upper stomach pains, & dull pain in my outer left hand joint. Both my ears are clogged\". Denies sob, chest pains, fever, N /V/D or dizziness.     At the time of signout we were awaiting: labs, CT for dispo    Medical Decision Making      ED Course as of 07/08/25 1119   Tue Jul 08, 2025   0704 Patient signed out to me at this time.  Diagnosed by the prior ED physician with otitis media.  Plan for oral antibiotics after CT scan if disposition appropriate for home. Has significant abdominal tenderness with anorexia over the last several days. [TL]   0728 Leukocytosis improving [TL]   0729 Positive anion gap acidosis.  Hyponatremia worsening.  Kidney function generally stable.  Decreased bicarb.  Already getting 1 L IV fluids. [TL]   0753 Comprehensive metabolic panel(!)  Acute on chronic hyponatremia, slightly diminished bicarb level, otherwise no acute electrolyte or hepatorenal abnormality [CB]   0753 LIPASE(!): 7  Not greater than 3 times upper normal limit doubt pancreatitis [CB]   0753 Beta-Hydroxybutyrate(!): 1.60  Minimally elevated suggestive of poor intake, not greater than [CB]   0754 Suspect starvation ketoacidosis, will start on D5 LR. [TL]   0805 EKG performed at 08: 01 and independently reviewed by provider: Reveals NSR with a rate of 71 bpm, normal axis, normal intervals, no ST " changes, no T wave abnormalities, no ectopy. No STEMI.  Overall low voltage. [TL]   0822 Coronavirus 2019, PCR: Not Detected [CB]   0822 Flu B Result: Not Detected [CB]   0822 Flu A Result: Not Detected [CB]   0840 Troponin I, High Sensitivity(!!): 55  Elevated, will repeat [CB]   0914 IMPRESSION:  1. Moderate-sized fat containing right inguinal hernia without  significant associated inflammation.  2. Partially visualized right lower lobe consolidative opacities  concerning for aspiration and/or pneumonia in the appropriate  clinical setting.       [TL]   0917 CT abdomen pelvis w IV contrast  1. Moderate-sized fat containing right inguinal hernia without  significant associated inflammation.  2. Partially visualized right lower lobe consolidative opacities  concerning for aspiration and/or pneumonia in the appropriate  clinical setting.   [CB]   0925 Chest Radiograph interpretation: No acute cardiopulmonary process.  No pneumothorax, widened mediastinum, pneumonia. [TL]   0930 Shared decision making for disposition  Patient and/or patient´s representative was counseled regarding labs, imaging, likely diagnosis. All questions were answered. Recommendation was made   for Admission given the need for further escalation of care to inpatient management. Patient agreed and was admitted in stable condition. Admitting team was notified of any pending labs or imaging to ensure continuity of care.    [CB]   0932 Have discussed with ED pharmacist will give 20 mg prednisone given known history of [place]'s disease, hyponatremia in the setting of acute illness. [TL]   1007 Repeat trop downtrended. [TL]      ED Course User Index  [CB] Nicholas Nicole DO  [TL] Hayes Mooney DO         Diagnoses as of 07/08/25 1119   Hyponatremia   Increased anion gap metabolic acidosis   Starvation ketoacidosis   Pneumonia of right lung due to infectious organism, unspecified part of lung         Final diagnoses:   [E87.1] Hyponatremia    [E87.29] Increased anion gap metabolic acidosis   [T73.0XXA, E87.29] Starvation ketoacidosis   [J18.9] Pneumonia of right lung due to infectious organism, unspecified part of lung           Procedure  Procedures    Nicholas Nicole DO     Reviewed and approved by NICHOLAS NICOLE on 7/8/25 at 11:19 AM.

## 2025-07-08 NOTE — ASSESSMENT & PLAN NOTE
Patient with generalized weakness secondary to community-acquired pneumonia  Recently had bilateral ear infection  Will continue with ceftriaxone and azithromycin and change antibiotics depending on the culture and susceptibility  IV fluids with 1 L of normal saline  Hyponatremia is likely hypovolemic  Anticipate improvement with patient eating and drinking

## 2025-07-08 NOTE — ASSESSMENT & PLAN NOTE
Likely secondary to demand  No chest pain  Troponin is downtrending  Will treat the underlying illness  Aspirin 81 mg daily

## 2025-07-08 NOTE — ASSESSMENT & PLAN NOTE
Patient profoundly weak and was missing some of the doses of his prednisone 5 mg  Will start patient on prednisone 40 mg daily for 5 days total

## 2025-07-08 NOTE — ASSESSMENT & PLAN NOTE
Last hemoglobin A1c about a year ago was 5.8  Monitor with steroids  If persistently high will add coverage with sliding scale insulin

## 2025-07-09 LAB
ALBUMIN SERPL BCP-MCNC: 2.8 G/DL (ref 3.4–5)
ALP SERPL-CCNC: 53 U/L (ref 33–136)
ALT SERPL W P-5'-P-CCNC: 25 U/L (ref 10–52)
ANION GAP SERPL CALC-SCNC: 15 MMOL/L (ref 10–20)
AST SERPL W P-5'-P-CCNC: 30 U/L (ref 9–39)
ATRIAL RATE: 65 BPM
BASOPHILS # BLD AUTO: 0.04 X10*3/UL (ref 0–0.1)
BASOPHILS NFR BLD AUTO: 0.2 %
BILIRUB SERPL-MCNC: 0.5 MG/DL (ref 0–1.2)
BUN SERPL-MCNC: 24 MG/DL (ref 6–23)
CALCIUM SERPL-MCNC: 8.1 MG/DL (ref 8.6–10.3)
CHLORIDE SERPL-SCNC: 101 MMOL/L (ref 98–107)
CO2 SERPL-SCNC: 18 MMOL/L (ref 21–32)
CREAT SERPL-MCNC: 1.17 MG/DL (ref 0.5–1.3)
EGFRCR SERPLBLD CKD-EPI 2021: 67 ML/MIN/1.73M*2
EOSINOPHIL # BLD AUTO: 0.01 X10*3/UL (ref 0–0.7)
EOSINOPHIL NFR BLD AUTO: 0.1 %
ERYTHROCYTE [DISTWIDTH] IN BLOOD BY AUTOMATED COUNT: 12.1 % (ref 11.5–14.5)
GLUCOSE BLD MANUAL STRIP-MCNC: 123 MG/DL (ref 74–99)
GLUCOSE BLD MANUAL STRIP-MCNC: 141 MG/DL (ref 74–99)
GLUCOSE BLD MANUAL STRIP-MCNC: 150 MG/DL (ref 74–99)
GLUCOSE BLD MANUAL STRIP-MCNC: 161 MG/DL (ref 74–99)
GLUCOSE BLD MANUAL STRIP-MCNC: 186 MG/DL (ref 74–99)
GLUCOSE SERPL-MCNC: 134 MG/DL (ref 74–99)
HCT VFR BLD AUTO: 33.6 % (ref 41–52)
HGB BLD-MCNC: 11.9 G/DL (ref 13.5–17.5)
IMM GRANULOCYTES # BLD AUTO: 0.12 X10*3/UL (ref 0–0.7)
IMM GRANULOCYTES NFR BLD AUTO: 0.7 % (ref 0–0.9)
LYMPHOCYTES # BLD AUTO: 1.28 X10*3/UL (ref 1.2–4.8)
LYMPHOCYTES NFR BLD AUTO: 7.1 %
MCH RBC QN AUTO: 31.4 PG (ref 26–34)
MCHC RBC AUTO-ENTMCNC: 35.4 G/DL (ref 32–36)
MCV RBC AUTO: 89 FL (ref 80–100)
MONOCYTES # BLD AUTO: 1.2 X10*3/UL (ref 0.1–1)
MONOCYTES NFR BLD AUTO: 6.7 %
NEUTROPHILS # BLD AUTO: 15.32 X10*3/UL (ref 1.2–7.7)
NEUTROPHILS NFR BLD AUTO: 85.2 %
NRBC BLD-RTO: 0 /100 WBCS (ref 0–0)
P AXIS: 51 DEGREES
P OFFSET: 204 MS
P ONSET: 146 MS
PLATELET # BLD AUTO: 332 X10*3/UL (ref 150–450)
POTASSIUM SERPL-SCNC: 3.8 MMOL/L (ref 3.5–5.3)
PR INTERVAL: 152 MS
PROT SERPL-MCNC: 7.1 G/DL (ref 6.4–8.2)
Q ONSET: 222 MS
QRS COUNT: 11 BEATS
QRS DURATION: 82 MS
QT INTERVAL: 420 MS
QTC CALCULATION(BAZETT): 436 MS
QTC FREDERICIA: 431 MS
R AXIS: 40 DEGREES
RBC # BLD AUTO: 3.79 X10*6/UL (ref 4.5–5.9)
SODIUM SERPL-SCNC: 130 MMOL/L (ref 136–145)
T AXIS: 32 DEGREES
T OFFSET: 432 MS
VENTRICULAR RATE: 65 BPM
WBC # BLD AUTO: 18 X10*3/UL (ref 4.4–11.3)

## 2025-07-09 PROCEDURE — 99233 SBSQ HOSP IP/OBS HIGH 50: CPT | Performed by: INTERNAL MEDICINE

## 2025-07-09 PROCEDURE — 1200000002 HC GENERAL ROOM WITH TELEMETRY DAILY

## 2025-07-09 PROCEDURE — 85025 COMPLETE CBC W/AUTO DIFF WBC: CPT | Performed by: INTERNAL MEDICINE

## 2025-07-09 PROCEDURE — 80053 COMPREHEN METABOLIC PANEL: CPT | Performed by: INTERNAL MEDICINE

## 2025-07-09 PROCEDURE — 94640 AIRWAY INHALATION TREATMENT: CPT

## 2025-07-09 PROCEDURE — 2500000002 HC RX 250 W HCPCS SELF ADMINISTERED DRUGS (ALT 637 FOR MEDICARE OP, ALT 636 FOR OP/ED): Performed by: INTERNAL MEDICINE

## 2025-07-09 PROCEDURE — 36415 COLL VENOUS BLD VENIPUNCTURE: CPT | Performed by: INTERNAL MEDICINE

## 2025-07-09 PROCEDURE — 2500000004 HC RX 250 GENERAL PHARMACY W/ HCPCS (ALT 636 FOR OP/ED): Performed by: INTERNAL MEDICINE

## 2025-07-09 PROCEDURE — 87205 SMEAR GRAM STAIN: CPT | Mod: STJLAB | Performed by: INTERNAL MEDICINE

## 2025-07-09 PROCEDURE — 2500000001 HC RX 250 WO HCPCS SELF ADMINISTERED DRUGS (ALT 637 FOR MEDICARE OP): Performed by: INTERNAL MEDICINE

## 2025-07-09 PROCEDURE — 82947 ASSAY GLUCOSE BLOOD QUANT: CPT

## 2025-07-09 RX ORDER — CODEINE PHOSPHATE AND GUAIFENESIN 10; 100 MG/5ML; MG/5ML
5 SOLUTION ORAL EVERY 6 HOURS PRN
Status: DISCONTINUED | OUTPATIENT
Start: 2025-07-09 | End: 2025-07-09

## 2025-07-09 RX ORDER — BENZONATATE 100 MG/1
100 CAPSULE ORAL 3 TIMES DAILY PRN
Status: DISCONTINUED | OUTPATIENT
Start: 2025-07-09 | End: 2025-07-10 | Stop reason: HOSPADM

## 2025-07-09 RX ORDER — AZITHROMYCIN 500 MG/1
500 TABLET, FILM COATED ORAL EVERY 24 HOURS
Status: DISCONTINUED | OUTPATIENT
Start: 2025-07-10 | End: 2025-07-10 | Stop reason: HOSPADM

## 2025-07-09 RX ORDER — INSULIN LISPRO 100 [IU]/ML
0-15 INJECTION, SOLUTION INTRAVENOUS; SUBCUTANEOUS
Status: DISCONTINUED | OUTPATIENT
Start: 2025-07-09 | End: 2025-07-10

## 2025-07-09 RX ORDER — GUAIFENESIN 600 MG/1
600 TABLET, EXTENDED RELEASE ORAL 2 TIMES DAILY
Status: DISCONTINUED | OUTPATIENT
Start: 2025-07-09 | End: 2025-07-10 | Stop reason: HOSPADM

## 2025-07-09 RX ORDER — CODEINE PHOSPHATE AND GUAIFENESIN 10; 100 MG/5ML; MG/5ML
5 SOLUTION ORAL EVERY 6 HOURS PRN
Status: DISCONTINUED | OUTPATIENT
Start: 2025-07-09 | End: 2025-07-10 | Stop reason: HOSPADM

## 2025-07-09 RX ADMIN — AZITHROMYCIN MONOHYDRATE 500 MG: 500 INJECTION, POWDER, LYOPHILIZED, FOR SOLUTION INTRAVENOUS at 09:44

## 2025-07-09 RX ADMIN — BENZONATATE 100 MG: 100 CAPSULE ORAL at 03:33

## 2025-07-09 RX ADMIN — INSULIN LISPRO 3 UNITS: 100 INJECTION, SOLUTION INTRAVENOUS; SUBCUTANEOUS at 17:21

## 2025-07-09 RX ADMIN — GUAIFENESIN 600 MG: 600 TABLET ORAL at 09:44

## 2025-07-09 RX ADMIN — HEPARIN SODIUM 5000 UNITS: 5000 INJECTION, SOLUTION INTRAVENOUS; SUBCUTANEOUS at 13:15

## 2025-07-09 RX ADMIN — FLUTICASONE PROPIONATE 1 SPRAY: 50 SPRAY, METERED NASAL at 20:22

## 2025-07-09 RX ADMIN — CEFTRIAXONE 2 G: 2 INJECTION, SOLUTION INTRAVENOUS at 11:09

## 2025-07-09 RX ADMIN — CARBAMIDE PEROXIDE 10 DROP: 65 SOLUTION/ DROPS TOPICAL at 09:46

## 2025-07-09 RX ADMIN — GUAIFENESIN 600 MG: 600 TABLET ORAL at 20:22

## 2025-07-09 RX ADMIN — GUAIFENESIN AND CODEINE PHOSPHATE 5 ML: 100; 10 SOLUTION ORAL at 11:36

## 2025-07-09 RX ADMIN — CARBAMIDE PEROXIDE 10 DROP: 65 SOLUTION/ DROPS TOPICAL at 20:23

## 2025-07-09 RX ADMIN — ASPIRIN 81 MG CHEWABLE TABLET 81 MG: 81 TABLET CHEWABLE at 09:44

## 2025-07-09 RX ADMIN — ATORVASTATIN CALCIUM 40 MG: 40 TABLET, FILM COATED ORAL at 09:44

## 2025-07-09 RX ADMIN — HEPARIN SODIUM 5000 UNITS: 5000 INJECTION, SOLUTION INTRAVENOUS; SUBCUTANEOUS at 21:48

## 2025-07-09 RX ADMIN — LEVOTHYROXINE SODIUM 50 MCG: 0.05 TABLET ORAL at 06:07

## 2025-07-09 RX ADMIN — IPRATROPIUM BROMIDE AND ALBUTEROL SULFATE 3 ML: 2.5; .5 SOLUTION RESPIRATORY (INHALATION) at 20:21

## 2025-07-09 RX ADMIN — PREDNISONE 40 MG: 20 TABLET ORAL at 09:44

## 2025-07-09 RX ADMIN — IPRATROPIUM BROMIDE AND ALBUTEROL SULFATE 3 ML: 2.5; .5 SOLUTION RESPIRATORY (INHALATION) at 07:10

## 2025-07-09 RX ADMIN — IPRATROPIUM BROMIDE AND ALBUTEROL SULFATE 3 ML: 2.5; .5 SOLUTION RESPIRATORY (INHALATION) at 00:59

## 2025-07-09 SDOH — HEALTH STABILITY: PHYSICAL HEALTH
HOW OFTEN DO YOU NEED TO HAVE SOMEONE HELP YOU WHEN YOU READ INSTRUCTIONS, PAMPHLETS, OR OTHER WRITTEN MATERIAL FROM YOUR DOCTOR OR PHARMACY?: NEVER

## 2025-07-09 ASSESSMENT — PAIN SCALES - GENERAL
PAINLEVEL_OUTOF10: 0 - NO PAIN

## 2025-07-09 ASSESSMENT — PAIN - FUNCTIONAL ASSESSMENT
PAIN_FUNCTIONAL_ASSESSMENT: 0-10
PAIN_FUNCTIONAL_ASSESSMENT: 0-10

## 2025-07-09 ASSESSMENT — COGNITIVE AND FUNCTIONAL STATUS - GENERAL
DAILY ACTIVITIY SCORE: 24
DAILY ACTIVITIY SCORE: 24
MOBILITY SCORE: 24
MOBILITY SCORE: 24

## 2025-07-09 NOTE — ASSESSMENT & PLAN NOTE
Likely secondary to demand  No chest pain  Troponin is downtrending  Continue treating underlying illness  Aspirin 81 mg daily

## 2025-07-09 NOTE — PROGRESS NOTES
Attempted to meet with patient, another service at bedside. Per PT, no therapy needs at d/c. Will attempt assessment at another time.           Shruti Mart RN

## 2025-07-09 NOTE — PROGRESS NOTES
Occupational Therapy                 Therapy Communication Note    Patient Name: Adrian Rosa  MRN: [id number]  Department: [facility] 3 N  Room: 3026/3026-A  Today's Date: 7/9/2025     Discipline: Occupational Therapy      Comment: OT orders received and chart reviewed. Pt demos independence in room for mobility and ADLs. Pt. Has no skilled OT needs. Will sign off.

## 2025-07-09 NOTE — ASSESSMENT & PLAN NOTE
Generalized weakness is improving  Elevated procalcitonin  Pending collection for urine strep and Legionella antigen  Continue ceftriaxone and azithromycin  Hypovolemic hyponatremia has improved  Elevated WBC count secondary to steroids

## 2025-07-09 NOTE — CARE PLAN
1135- Notified MD that azithromycin burned and was painful when given IV. Inquired about switching it to oral.    EOS- No acute changes throughout the shift. Patient denies any pain and has shortness of breath with exertion and coughing. A dose of cough syrup given without much relief. Sputum sample sent down. Patient up independently throughout the shift. IV antibiotics given. Safety maintained and call light within reach.

## 2025-07-09 NOTE — PROGRESS NOTES
Physical Therapy                 Therapy Communication Note    Patient Name: Adrian Rosa  MRN: [id number]  Department: [facility] 3 N  Room: 302/3026-A  Today's Date: 7/9/2025     Discipline: Physical Therapy    Comment: Orders received and chart review completed. Pt ambulating independently in room. PT screen performed at 1320.  Pt independent with mobility, ADLs and IADLs. Pt with no concerns at this time about returning home. Will discharge PT orders.

## 2025-07-09 NOTE — PROGRESS NOTES
Adrian Rosa is a 69 y.o. male on day 1 of admission presenting with Hyponatremia.      Subjective   Feeling better.  Has dry cough.  Nothing coming up.  Appetite is better.  Talked about white count going up and procalcitonin being high        Objective     Last Recorded Vitals  /60 (BP Location: Right arm, Patient Position: Lying)   Pulse 69   Temp 36.4 °C (97.5 °F) (Temporal)   Resp 18   Wt 96.6 kg (212 lb 15.4 oz)   SpO2 95%   Intake/Output last 3 Shifts:    Intake/Output Summary (Last 24 hours) at 7/9/2025 1016  Last data filed at 7/9/2025 0900  Gross per 24 hour   Intake 1358.33 ml   Output 500 ml   Net 858.33 ml       Admission Weight  Weight: 99.8 kg (220 lb) (07/08/25 0625)    Daily Weight  07/08/25 : 96.6 kg (212 lb 15.4 oz)      Physical Exam:  General: alert, mild distress.  Still having difficulty hearing.  On room air  HEENT: PERRLA, head intact and normocephalic  Neck: Normal to inspection  Lungs: Clear to auscultation, work of breathing within normal limit  Cardiac: Regular rate and rhythm  Abdomen: Soft nontender, positive bowel sounds  : Exam deferred  Skin: Intact  Hematology: No petechia or excessive ecchymosis  Musculoskeletal: Without significant trauma  Neurological: Alert awake oriented, no focal deficit, cranial nerves grossly intact  Psych: No suicidal ideation or homicidal ideation    Relevant Results  Scheduled medications  Scheduled Medications[1]  Continuous medications  Continuous Medications[2]  PRN medications  PRN Medications[3]   Labs  Results from last 7 days   Lab Units 07/09/25  0613 07/08/25  0642   WBC AUTO x10*3/uL 18.0* 12.5*   HEMOGLOBIN g/dL 11.9* 13.3*   HEMATOCRIT % 33.6* 37.4*   PLATELETS AUTO x10*3/uL 332 353     Results from last 7 days   Lab Units 07/09/25  0613 07/08/25  0642   SODIUM mmol/L 130* 126*   POTASSIUM mmol/L 3.8 4.0   CHLORIDE mmol/L 101 94*   CO2 mmol/L 18* 19*   BUN mg/dL 24* 23   CREATININE mg/dL 1.17 1.19   CALCIUM mg/dL 8.1* 8.7    PROTEIN TOTAL g/dL 7.1 8.1   BILIRUBIN TOTAL mg/dL 0.5 1.0   ALK PHOS U/L 53 65   ALT U/L 25 27   AST U/L 30 39   GLUCOSE mg/dL 134* 114*       Imaging  XR chest 2 views  Result Date: 7/8/2025  Small nodular right suprahilar infiltrate consistent with pneumonia. Follow-up to ensure resolution after appropriate treatment recommended.   MACRO: None.   Signed by: Khalida Wagner 7/8/2025 9:36 AM Dictation workstation:   [id number]    CT abdomen pelvis w IV contrast  Result Date: 7/8/2025  1. Moderate-sized fat containing right inguinal hernia without significant associated inflammation. 2. Partially visualized right lower lobe consolidative opacities concerning for aspiration and/or pneumonia in the appropriate clinical setting.       MACRO: None   Signed by: Milad Pastor 7/8/2025 9:02 AM Dictation workstation:   [id number]      Cardiology, Vascular, and Other Imaging  ECG 12 lead  Result Date: 7/8/2025  Normal sinus rhythm Low voltage QRS Septal infarct (cited on or before 08-JUL-2025) Abnormal ECG When compared with ECG of 08-JUL-2025 08:01, (unconfirmed) No significant change was found    ECG 12 lead  Result Date: 7/8/2025  Normal sinus rhythm Low voltage QRS Septal infarct , can not exclude Reconfirmed by Nikunj Barrett ([id number]) on 7/8/2025 2:58:47 PM                      Assessment/Plan   Adrian Rosa is a 69 y.o. male with past medical history of prediabetes, hypothyroidism, hyperlipidemia, [place]'s disease, BPH, spinal stenosis presented for abdominal pain and bilateral ear pain.  Patient was worked up and was found to have hyponatremia and community-acquired pneumonia of right side causing his symptoms and was admitted for that.   Assessment & Plan  Hyponatremia  Community acquired pneumonia  Starvation ketoacidosis  Generalized weakness is improving  Elevated procalcitonin  Pending collection for urine strep and Legionella antigen  Continue ceftriaxone and azithromycin  Hypovolemic hyponatremia has  improved  Elevated WBC count secondary to steroids  Elevated troponin  Likely secondary to demand  No chest pain  Troponin is downtrending  Continue treating underlying illness  Aspirin 81 mg daily  [place]'s disease (Multi)  Continue prednisone 40 mg daily for total of 5 days  Clinically leukocytosis and high blood sugar  Resume 5 mg on discharge  Hyperlipidemia  Atorvastatin  Prediabetes  Last hemoglobin A1c about a year ago was 5.8  Blood sugars are high with steroids  Accu-Chek with sliding scale insulin  Poor appetite  Secondary to above illness  Will continue to monitor  Generalized weakness  PT OT evaluation  Fall precautions       Plan discussed with patient at bedside    High level of MDM based on above issue and discussing plan    This note is created using voice recognition software. All efforts are made to minimize errors, if there are errors there due to transcription.    Aston Razo  Hospitalist           [1] aspirin, 81 mg, oral, Daily  atorvastatin, 40 mg, oral, Daily  azithromycin, 500 mg, intravenous, q24h  carbamide peroxide, 10 drop, Each Ear, BID  cefTRIAXone, 2 g, intravenous, q24h  fluticasone, 1 spray, Each Nostril, Daily  guaiFENesin, 600 mg, oral, BID  heparin (porcine), 5,000 Units, subcutaneous, q8h  insulin lispro, 0-15 Units, subcutaneous, TID AC  ipratropium-albuteroL, 3 mL, nebulization, q6h  levothyroxine, 50 mcg, oral, Daily  predniSONE, 40 mg, oral, Daily    [2]    [3] PRN medications: benzonatate, codeine-guaifenesin, ipratropium-albuteroL, oxygen

## 2025-07-09 NOTE — PROGRESS NOTES
Spiritual Care Visit  Spiritual Care Request    Reason for Visit:  Routine Visit: Introduction     Request Received From:       Focus of Care:  Visited With: Patient         Refer to :          Spiritual Care Assessment    Spiritual Assessment:                      Care Provided:  Intended Effects: Build relationship of care and support, Preserve dignity and respect, Establish rapport and connectedness, Demonstrate caring and concern  Methods: Offer spiritual/Religion support  Interventions: Active listening, Ask guided questions, Assist someone with Advance Directives, Share words of hope and inspiration    Sense of Community and or Quaker Affiliation:  Mu-ism         Addressed Needs/Concerns and/or Fina Through:          Outcome:        Advance Directives:  Advance Directives  Advance Directives Reviewed Date: 07/09/25  Advance Directives Reviewed with: Patient  Advance Directives Completed Date: 07/09/25  Advance Directives Type: Living Will, Power of  for Healthcare      Spiritual Care Annotation    Annotation:

## 2025-07-09 NOTE — ASSESSMENT & PLAN NOTE
Continue prednisone 40 mg daily for total of 5 days  Clinically leukocytosis and high blood sugar  Resume 5 mg on discharge

## 2025-07-09 NOTE — ASSESSMENT & PLAN NOTE
Last hemoglobin A1c about a year ago was 5.8  Blood sugars are high with steroids  Accu-Chek with sliding scale insulin

## 2025-07-09 NOTE — PROGRESS NOTES
07/09/25 1532   Discharge Planning   Living Arrangements Alone   Support Systems Friends/neighbors   Assistance Needed none   Type of Residence Private residence   Home or Post Acute Services None   Expected Discharge Disposition Home   Intensity of Service   Intensity of Service 0-30 min     Met with patient at bedside. He lives at home alone in [place]. He states he is independent, no use of DME. PCP is Nikunj Ybarra. Patient confirms understanding of how to manage his health at home and plans to return home at d/c.

## 2025-07-10 ENCOUNTER — PHARMACY VISIT (OUTPATIENT)
Dept: PHARMACY | Facility: CLINIC | Age: 70
End: 2025-07-10
Payer: COMMERCIAL

## 2025-07-10 VITALS
HEART RATE: 76 BPM | OXYGEN SATURATION: 97 % | HEIGHT: 67 IN | WEIGHT: 212.96 LBS | DIASTOLIC BLOOD PRESSURE: 73 MMHG | RESPIRATION RATE: 18 BRPM | BODY MASS INDEX: 33.43 KG/M2 | TEMPERATURE: 97.3 F | SYSTOLIC BLOOD PRESSURE: 122 MMHG

## 2025-07-10 LAB
ANION GAP SERPL CALC-SCNC: 15 MMOL/L (ref 10–20)
BASOPHILS # BLD AUTO: 0.03 X10*3/UL (ref 0–0.1)
BASOPHILS NFR BLD AUTO: 0.2 %
BUN SERPL-MCNC: 26 MG/DL (ref 6–23)
CALCIUM SERPL-MCNC: 8.1 MG/DL (ref 8.6–10.3)
CHLORIDE SERPL-SCNC: 104 MMOL/L (ref 98–107)
CO2 SERPL-SCNC: 19 MMOL/L (ref 21–32)
CREAT SERPL-MCNC: 1.13 MG/DL (ref 0.5–1.3)
EGFRCR SERPLBLD CKD-EPI 2021: 70 ML/MIN/1.73M*2
EOSINOPHIL # BLD AUTO: 0 X10*3/UL (ref 0–0.7)
EOSINOPHIL NFR BLD AUTO: 0 %
ERYTHROCYTE [DISTWIDTH] IN BLOOD BY AUTOMATED COUNT: 12.9 % (ref 11.5–14.5)
GLUCOSE BLD MANUAL STRIP-MCNC: 104 MG/DL (ref 74–99)
GLUCOSE BLD MANUAL STRIP-MCNC: 131 MG/DL (ref 74–99)
GLUCOSE SERPL-MCNC: 94 MG/DL (ref 74–99)
HCT VFR BLD AUTO: 32.9 % (ref 41–52)
HGB BLD-MCNC: 11.4 G/DL (ref 13.5–17.5)
IMM GRANULOCYTES # BLD AUTO: 0.13 X10*3/UL (ref 0–0.7)
IMM GRANULOCYTES NFR BLD AUTO: 0.8 % (ref 0–0.9)
LYMPHOCYTES # BLD AUTO: 1.89 X10*3/UL (ref 1.2–4.8)
LYMPHOCYTES NFR BLD AUTO: 11.6 %
MAGNESIUM SERPL-MCNC: 2 MG/DL (ref 1.6–2.4)
MCH RBC QN AUTO: 31.4 PG (ref 26–34)
MCHC RBC AUTO-ENTMCNC: 34.7 G/DL (ref 32–36)
MCV RBC AUTO: 91 FL (ref 80–100)
MONOCYTES # BLD AUTO: 1.07 X10*3/UL (ref 0.1–1)
MONOCYTES NFR BLD AUTO: 6.6 %
NEUTROPHILS # BLD AUTO: 13.17 X10*3/UL (ref 1.2–7.7)
NEUTROPHILS NFR BLD AUTO: 80.8 %
NRBC BLD-RTO: 0 /100 WBCS (ref 0–0)
PLATELET # BLD AUTO: 349 X10*3/UL (ref 150–450)
POTASSIUM SERPL-SCNC: 4 MMOL/L (ref 3.5–5.3)
RBC # BLD AUTO: 3.63 X10*6/UL (ref 4.5–5.9)
SODIUM SERPL-SCNC: 134 MMOL/L (ref 136–145)
WBC # BLD AUTO: 16.3 X10*3/UL (ref 4.4–11.3)

## 2025-07-10 PROCEDURE — 36415 COLL VENOUS BLD VENIPUNCTURE: CPT | Performed by: INTERNAL MEDICINE

## 2025-07-10 PROCEDURE — 82374 ASSAY BLOOD CARBON DIOXIDE: CPT | Performed by: INTERNAL MEDICINE

## 2025-07-10 PROCEDURE — 82947 ASSAY GLUCOSE BLOOD QUANT: CPT

## 2025-07-10 PROCEDURE — 85025 COMPLETE CBC W/AUTO DIFF WBC: CPT | Performed by: INTERNAL MEDICINE

## 2025-07-10 PROCEDURE — 87449 NOS EACH ORGANISM AG IA: CPT | Mod: STJLAB | Performed by: INTERNAL MEDICINE

## 2025-07-10 PROCEDURE — 2500000004 HC RX 250 GENERAL PHARMACY W/ HCPCS (ALT 636 FOR OP/ED): Performed by: INTERNAL MEDICINE

## 2025-07-10 PROCEDURE — 2500000002 HC RX 250 W HCPCS SELF ADMINISTERED DRUGS (ALT 637 FOR MEDICARE OP, ALT 636 FOR OP/ED): Performed by: INTERNAL MEDICINE

## 2025-07-10 PROCEDURE — 2500000001 HC RX 250 WO HCPCS SELF ADMINISTERED DRUGS (ALT 637 FOR MEDICARE OP): Performed by: INTERNAL MEDICINE

## 2025-07-10 PROCEDURE — 87899 AGENT NOS ASSAY W/OPTIC: CPT | Mod: STJLAB | Performed by: INTERNAL MEDICINE

## 2025-07-10 PROCEDURE — 83735 ASSAY OF MAGNESIUM: CPT | Performed by: INTERNAL MEDICINE

## 2025-07-10 PROCEDURE — 99239 HOSP IP/OBS DSCHRG MGMT >30: CPT | Performed by: INTERNAL MEDICINE

## 2025-07-10 PROCEDURE — 94640 AIRWAY INHALATION TREATMENT: CPT

## 2025-07-10 PROCEDURE — RXMED WILLOW AMBULATORY MEDICATION CHARGE

## 2025-07-10 RX ORDER — PREDNISONE 5 MG/1
5 TABLET ORAL DAILY
Status: DISCONTINUED | OUTPATIENT
Start: 2025-07-11 | End: 2025-07-10 | Stop reason: HOSPADM

## 2025-07-10 RX ORDER — CODEINE PHOSPHATE AND GUAIFENESIN 10; 100 MG/5ML; MG/5ML
5 SOLUTION ORAL EVERY 6 HOURS PRN
Qty: 120 ML | Refills: 0 | Status: SHIPPED | OUTPATIENT
Start: 2025-07-10 | End: 2025-07-19 | Stop reason: HOSPADM

## 2025-07-10 RX ORDER — BENZONATATE 100 MG/1
100 CAPSULE ORAL 3 TIMES DAILY PRN
Qty: 20 CAPSULE | Refills: 0 | Status: SHIPPED | OUTPATIENT
Start: 2025-07-10

## 2025-07-10 RX ORDER — IPRATROPIUM BROMIDE AND ALBUTEROL SULFATE 2.5; .5 MG/3ML; MG/3ML
3 SOLUTION RESPIRATORY (INHALATION)
Status: DISCONTINUED | OUTPATIENT
Start: 2025-07-10 | End: 2025-07-10 | Stop reason: HOSPADM

## 2025-07-10 RX ORDER — ALBUTEROL SULFATE 90 UG/1
2 INHALANT RESPIRATORY (INHALATION) EVERY 4 HOURS PRN
Qty: 8.5 G | Refills: 5 | Status: SHIPPED | OUTPATIENT
Start: 2025-07-10

## 2025-07-10 RX ORDER — AZITHROMYCIN 500 MG/1
500 TABLET, FILM COATED ORAL EVERY 24 HOURS
Qty: 3 TABLET | Refills: 0 | Status: SHIPPED | OUTPATIENT
Start: 2025-07-11 | End: 2025-07-19 | Stop reason: HOSPADM

## 2025-07-10 RX ORDER — CEFDINIR 300 MG/1
300 CAPSULE ORAL 2 TIMES DAILY
Qty: 10 CAPSULE | Refills: 0 | Status: SHIPPED | OUTPATIENT
Start: 2025-07-10 | End: 2025-07-19 | Stop reason: HOSPADM

## 2025-07-10 RX ADMIN — ASPIRIN 81 MG CHEWABLE TABLET 81 MG: 81 TABLET CHEWABLE at 08:34

## 2025-07-10 RX ADMIN — ATORVASTATIN CALCIUM 40 MG: 40 TABLET, FILM COATED ORAL at 08:34

## 2025-07-10 RX ADMIN — GUAIFENESIN 600 MG: 600 TABLET ORAL at 08:34

## 2025-07-10 RX ADMIN — IPRATROPIUM BROMIDE AND ALBUTEROL SULFATE 3 ML: 2.5; .5 SOLUTION RESPIRATORY (INHALATION) at 00:50

## 2025-07-10 RX ADMIN — LEVOTHYROXINE SODIUM 50 MCG: 0.05 TABLET ORAL at 06:57

## 2025-07-10 RX ADMIN — IPRATROPIUM BROMIDE AND ALBUTEROL SULFATE 3 ML: 2.5; .5 SOLUTION RESPIRATORY (INHALATION) at 09:17

## 2025-07-10 RX ADMIN — BENZONATATE 100 MG: 100 CAPSULE ORAL at 09:53

## 2025-07-10 RX ADMIN — CARBAMIDE PEROXIDE 10 DROP: 65 SOLUTION/ DROPS TOPICAL at 08:35

## 2025-07-10 RX ADMIN — GUAIFENESIN AND CODEINE PHOSPHATE 5 ML: 100; 10 SOLUTION ORAL at 08:34

## 2025-07-10 RX ADMIN — PREDNISONE 40 MG: 20 TABLET ORAL at 08:33

## 2025-07-10 RX ADMIN — CEFTRIAXONE 2 G: 2 INJECTION, SOLUTION INTRAVENOUS at 09:53

## 2025-07-10 RX ADMIN — HEPARIN SODIUM 5000 UNITS: 5000 INJECTION, SOLUTION INTRAVENOUS; SUBCUTANEOUS at 05:30

## 2025-07-10 RX ADMIN — AZITHROMYCIN DIHYDRATE 500 MG: 500 TABLET ORAL at 08:34

## 2025-07-10 ASSESSMENT — COGNITIVE AND FUNCTIONAL STATUS - GENERAL
DAILY ACTIVITIY SCORE: 24
MOBILITY SCORE: 24

## 2025-07-10 ASSESSMENT — PAIN SCALES - GENERAL: PAINLEVEL_OUTOF10: 0 - NO PAIN

## 2025-07-10 ASSESSMENT — PAIN - FUNCTIONAL ASSESSMENT: PAIN_FUNCTIONAL_ASSESSMENT: 0-10

## 2025-07-10 NOTE — NURSING NOTE
ADOD: 7/10.   Destination: home  Transportation Provided by: Private Car  Patient feels updated by provider(s) and involved in POC.   Patient has been advised to defer to AVS for new medications and follow-up visits.   Patient is active with [facility]  Patient's Pharmacy [facility].  Appointments will be made by patient.  Patient has been notified about a survey and call back is to be expected 1-2 weeks post discharge.   Notified patient that DC Navigator is available everyday throughout their stay if any assistance is needed.       Patient will transport himself at time of DC  M2B initiated and discussed with patient    1015- reviewed AVS with patient, all questions answered  M2B to deliver in next 15-20mins.

## 2025-07-10 NOTE — CARE PLAN
The patient's goals for the shift include      The clinical goals for the shift include see poc      Problem: Pain - Adult  Goal: Verbalizes/displays adequate comfort level or baseline comfort level  Outcome: Progressing     Problem: Safety - Adult  Goal: Free from fall injury  Outcome: Progressing     Problem: Discharge Planning  Goal: Discharge to home or other facility with appropriate resources  Outcome: Progressing     Problem: Chronic Conditions and Co-morbidities  Goal: Patient's chronic conditions and co-morbidity symptoms are monitored and maintained or improved  Outcome: Progressing     Problem: Nutrition  Goal: Nutrient intake appropriate for maintaining nutritional needs  Outcome: Progressing     Problem: Fall/Injury  Goal: Not fall by end of shift  Outcome: Progressing  Goal: Be free from injury by end of the shift  Outcome: Progressing  Goal: Verbalize understanding of personal risk factors for fall in the hospital  Outcome: Progressing  Goal: Verbalize understanding of risk factor reduction measures to prevent injury from fall in the home  Outcome: Progressing  Goal: Use assistive devices by end of the shift  Outcome: Progressing  Goal: Pace activities to prevent fatigue by end of the shift  Outcome: Progressing     Problem: Diabetes  Goal: Achieve decreasing blood glucose levels by end of shift  Outcome: Progressing  Goal: Increase stability of blood glucose readings by end of shift  Outcome: Progressing  Goal: Decrease in ketones present in urine by end of shift  Outcome: Progressing  Goal: Maintain electrolyte levels within acceptable range throughout shift  Outcome: Progressing  Goal: Maintain glucose levels >70mg/dl to <250mg/dl throughout shift  Outcome: Progressing  Goal: No changes in neurological exam by end of shift  Outcome: Progressing  Goal: Learn about and adhere to nutrition recommendations by end of shift  Outcome: Progressing  Goal: Vital signs within normal range for age by end of  shift  Outcome: Progressing  Goal: Increase self care and/or family involovement by end of shift  Outcome: Progressing  Goal: Receive DSME education by end of shift  Outcome: Progressing

## 2025-07-10 NOTE — DISCHARGE SUMMARY
Discharge Diagnosis  Hyponatremia  Community-acquired pneumonia  Dehydration  Starvation ketoacidosis  ER infection           Issues Requiring Follow-Up  Follow-up with primary care provider after discharge  We recommend and have ordered chest x-ray two-view for you to be done in 4 weeks to make sure there is complete resolution of pneumonia    Discharge Meds     Medication List      START taking these medications     albuterol 90 mcg/actuation inhaler; Commonly known as: Ventolin HFA;   Inhale 2 puffs every 4 hours if needed for wheezing or shortness of   breath.   azithromycin 500 mg tablet; Commonly known as: Zithromax; Take 1 tablet   (500 mg) by mouth once every 24 hours for 3 doses.; Start taking on: July 11, 2025   cefdinir 300 mg capsule; Commonly known as: Omnicef; Take 1 capsule (300   mg) by mouth 2 times a day for 5 days.   codeine-guaifenesin  mg/5 mL syrup; Commonly known as:   Robitussin-AC; Take 5 mL by mouth every 6 hours if needed for cough (1st   line).     CONTINUE taking these medications     atorvastatin 40 mg tablet; Commonly known as: Lipitor; TAKE 1 TABLET BY   MOUTH EVERY DAY   fluticasone 50 mcg/actuation nasal spray; Commonly known as: Flonase;   Administer 1 spray into each nostril once daily. Shake gently. Before   first use, prime pump. After use, clean tip and replace cap.   levothyroxine 50 mcg tablet; Commonly known as: Synthroid, Levoxyl; Take   1 tablet (50 mcg) by mouth once daily in the morning. Take before meals.   predniSONE 5 mg tablet; Commonly known as: Deltasone; Take 1 tablet (5   mg) by mouth once daily.     STOP taking these medications     Cepacol Sore Throat (johanna-men) 15-3.6 mg lozenge; Generic drug:   benzocaine-menthol   ibuprofen 600 mg tablet   lidocaine 5 % patch; Commonly known as: Lidoderm       Test Results Pending At Discharge  Pending Labs       Order Current Status    Legionella Antigen, Urine In process    Streptococcus pneumoniae Antigen, Urine  In process    Respiratory Culture/Smear Preliminary result            Hospital Course  Adrian Rosa is a 69 y.o. male with past medical history of prediabetes, hypothyroidism, hyperlipidemia, [place]'s disease, BPH, spinal stenosis presented for abdominal pain and bilateral ear pain. Patient was worked up and was found to have hyponatremia and community-acquired pneumonia of right side causing his symptoms and was admitted for that.  After being admitted patient was started on antibiotics along with Debrox for his ear pain.  Patient had significant improvement with short course of steroids and antibiotic.  He is finally breaking his phlegm and his ear pain has improved significantly.  Patient had also elevated procalcitonin.  His sputum culture is pending but he is clinically feeling a lot better.His urine strep and Legionella antigen is also pending.  Recently his group A strep testing from his visit on 7/2/2025 is negative.  Patient is medically stable for discharge to complete the course of antibiotics with Augmentin and azithromycin.  He is asked to do salt water gargling to help with his sore throat.  He is also asked to get repeat chest x-ray in 1 month to make sure there is complete resolution and follow-up with primary care provider after discharge.    39 minutes spent in discharge timing    Pertinent Physical Exam At Time of Discharge  General: Alert, in minimal distress and intermittently coughing.  HEENT: PERRLA, head intact and normocephalic  Neck: Normal to inspection  Lungs: Clear to auscultation, work of breathing within normal limit  Cardiac: Regular rate and rhythm  Abdomen: Soft nontender, positive bowel sounds  : Exam deferred  Skin: Intact  Hematology: No petechia or excessive ecchymosis  Musculoskeletal: Without significant trauma  Neurological: Alert awake oriented, no focal deficit, cranial nerves grossly intact  Psych: No suicidal ideation or homicidal ideation    Outpatient  Follow-Up  Future Appointments   Date Time Provider Department Center   7/14/2025  2:00 PM Kevin Morel MD [id number]   9/2/2025  1:00 PM Nikunj Ybarra DO [id number]         Aston Razo MD

## 2025-07-10 NOTE — CARE PLAN
Problem: Pain - Adult  Goal: Verbalizes/displays adequate comfort level or baseline comfort level  Outcome: Progressing     Problem: Safety - Adult  Goal: Free from fall injury  Outcome: Progressing     Problem: Discharge Planning  Goal: Discharge to home or other facility with appropriate resources  Outcome: Progressing     Problem: Chronic Conditions and Co-morbidities  Goal: Patient's chronic conditions and co-morbidity symptoms are monitored and maintained or improved  Outcome: Progressing     Problem: Nutrition  Goal: Nutrient intake appropriate for maintaining nutritional needs  Outcome: Progressing     Problem: Fall/Injury  Goal: Not fall by end of shift  Outcome: Progressing     Problem: Fall/Injury  Goal: Be free from injury by end of the shift  Outcome: Progressing   The patient's goals for the shift include      The clinical goals for the shift include see poc  1052 Discharge instructions reviewed with pt, verbalized understanding. Home meds at bedside, delivered per Meds to Beds pharmacist. Pt states feeling shaky.   1215 Pt feeling better, less shaky, able to get dressed without difficulty, Vitals stable. Meds to beds pharmacist brought up tessalon pearls for home since pt stated they were more effective for his cough. No decline in assessment. Injury free throughout shift. Pt discharged to home via wheelchair. States he feels able to drive self home for discharge.

## 2025-07-10 NOTE — NURSING NOTE
Patient alert and oriented x3. Patient ambulatory in room. Patient has dry non-productive cough and using incentive spirometer. Lung sounds remain diminished. No acute changes this shift

## 2025-07-10 NOTE — HOSPITAL COURSE
Adrian Rosa is a 69 y.o. male with past medical history of prediabetes, hypothyroidism, hyperlipidemia, [place]'s disease, BPH, spinal stenosis presented for abdominal pain and bilateral ear pain. Patient was worked up and was found to have hyponatremia and community-acquired pneumonia of right side causing his symptoms and was admitted for that.  After being admitted patient was started on antibiotics along with Debrox for his ear pain.  Patient had significant improvement with short course of steroids and antibiotic.  He is finally breaking his phlegm and his ear pain has improved significantly.  Patient had also elevated procalcitonin.  His sputum culture is pending but he is clinically feeling a lot better.His urine strep and Legionella antigen is also pending.  Recently his group A strep testing from his visit on 7/2/2025 is negative.  Patient is medically stable for discharge to complete the course of antibiotics with Augmentin and azithromycin.  He is asked to do salt water gargling to help with his sore throat.  He is also asked to get repeat chest x-ray in 1 month to make sure there is complete resolution and follow-up with primary care provider after discharge.    39 minutes spent in discharge timing

## 2025-07-11 ENCOUNTER — PATIENT OUTREACH (OUTPATIENT)
Dept: PRIMARY CARE | Facility: CLINIC | Age: 70
End: 2025-07-11
Payer: MEDICARE

## 2025-07-11 LAB
BACTERIA SPEC RESP CULT: NORMAL
GRAM STN SPEC: NORMAL
GRAM STN SPEC: NORMAL
LEGIONELLA AG UR QL: NEGATIVE
S PNEUM AG UR QL: NEGATIVE

## 2025-07-11 NOTE — PROGRESS NOTES
Discharge Facility: [facility]  Discharge Diagnosis: Hyponatremia, community acquired pneumonia, dehydration  Admission Date: 7/8/2025  Discharge Date: 7/10/2025    Pt had recent ER visit at [facility] 7/2/2025 for acute upper respiratory infection.    PCP Appointment Date:  -Pt declines scheduling at this time; he is encouraged to call if needs change  -Next appt 9/2/2025 1300    Specialist Appointment Date:   -7/14/2025 1400 general surgeon  -repeat chest xray to be completed by 8/10/2025; pt aware    Hospital Encounter and Summary Linked: Yes    ED to Hosp-Admission (Discharged) with Aston Razo MD; Hayes Mooney DO (07/08/2025)     ED (Discharge)     See discharge assessment below for further details    Wrap Up  Wrap Up Additional Comments: Pt was admitted to [facility] 7/8-7/10/2025 for hyponatremia, dehydration, and community acquired pneumonia. Pt reports feeling better but states he is still having a cough. Pt discharged with prescriptions for azithromycin, robitussin, cefdinir, albuterol, and tessalon perles; pt denies questions or issues regarding medication. Pt reports understanding of discharge instructions. Pt aware of ordered repeat chest xray to be completed by 8/10/2025. Pt declines scheduling PCP follow up at this time, he is encouraged to call if needs change. Verified next PCP appt 9/2/2025 1300. Verified upcoming surgeon appt 7/14/2025 1400. Pt denies any questions, needs, or concerns. He is encouraged to call if questions or needs arise. (7/11/2025  9:21 AM)  Call End Time: 0925 (7/11/2025  9:21 AM)    Engagement  Call Start Time: 0921 (7/11/2025  9:21 AM)    Medications  Medications reviewed with patient/caregiver?: Yes (new prescriptions reviewed; albuterol, azithromycin, cefdinir, tessalon, and robitussin) (7/11/2025  9:21 AM)  Is the patient having any side effects they believe may be caused by any medication additions or changes?: No (7/11/2025  9:21 AM)  Does the patient have all  medications ordered at discharge?: Yes (7/11/2025  9:21 AM)  Care Management Interventions: No intervention needed (7/11/2025  9:21 AM)  Is the patient taking all medications as directed (includes completed medication regime)?: Yes (7/11/2025  9:21 AM)    Appointments  Does the patient have a primary care provider?: Yes (7/11/2025  9:21 AM)  Has the patient kept scheduled appointments due by today?: Yes (7/11/2025  9:21 AM)    Self Management  Has home health visited the patient within 72 hours of discharge?: Not applicable (7/11/2025  9:21 AM)    Patient Teaching  Does the patient have access to their discharge instructions?: Yes (7/11/2025  9:21 AM)  Care Management Interventions: Reviewed instructions with patient (7/11/2025  9:21 AM)  What is the patient's perception of their health status since discharge?: Improving (7/11/2025  9:21 AM)  Is the patient/caregiver able to teach back the hierarchy of who to call/visit for symptoms/problems? PCP, Specialist, Home Health nurse, Urgent Care, ED, 911: Yes (7/11/2025  9:21 AM)

## 2025-07-14 ENCOUNTER — APPOINTMENT (OUTPATIENT)
Dept: SURGERY | Facility: CLINIC | Age: 70
End: 2025-07-14
Payer: MEDICARE

## 2025-07-14 VITALS
BODY MASS INDEX: 32.65 KG/M2 | OXYGEN SATURATION: 95 % | DIASTOLIC BLOOD PRESSURE: 83 MMHG | HEART RATE: 65 BPM | HEIGHT: 67 IN | WEIGHT: 208 LBS | SYSTOLIC BLOOD PRESSURE: 143 MMHG

## 2025-07-14 DIAGNOSIS — K40.90 NON-RECURRENT UNILATERAL INGUINAL HERNIA WITHOUT OBSTRUCTION OR GANGRENE: ICD-10-CM

## 2025-07-14 PROCEDURE — 1157F ADVNC CARE PLAN IN RCRD: CPT | Performed by: STUDENT IN AN ORGANIZED HEALTH CARE EDUCATION/TRAINING PROGRAM

## 2025-07-14 PROCEDURE — 1159F MED LIST DOCD IN RCRD: CPT | Performed by: STUDENT IN AN ORGANIZED HEALTH CARE EDUCATION/TRAINING PROGRAM

## 2025-07-14 PROCEDURE — 3008F BODY MASS INDEX DOCD: CPT | Performed by: STUDENT IN AN ORGANIZED HEALTH CARE EDUCATION/TRAINING PROGRAM

## 2025-07-14 PROCEDURE — 1111F DSCHRG MED/CURRENT MED MERGE: CPT | Performed by: STUDENT IN AN ORGANIZED HEALTH CARE EDUCATION/TRAINING PROGRAM

## 2025-07-14 PROCEDURE — 99214 OFFICE O/P EST MOD 30 MIN: CPT | Performed by: STUDENT IN AN ORGANIZED HEALTH CARE EDUCATION/TRAINING PROGRAM

## 2025-07-14 NOTE — PROGRESS NOTES
History Of Present Illness  Patient is a 69 y.o. male with PMH significant for HLD, hypothyroidism, and Addisons disease on prednisone who presents for evaluation of a right inguinal hernia found incidentally on CT. Patient reports intermittent right groin pain unrelated to physical activity or straining. He denies any overlying skin changes. No nausea or vomiting. He is having regular bowel movements. He was recently admitted to the hospital for pneumonia and sinus infection and states he is still recovering.      Past Medical History  HLD, hypothyroidism, [place]'s disease on prednisone     Surgical History  Laparoscopic cholecystectomy (2022), laparoscopic nephrolithotomy and stent removal (2023)      Social History  He reports that he quit smoking about 50 years ago. His smoking use included cigarettes. He has never used smokeless tobacco. He reports current alcohol use. He reports that he does not use drugs.    Family History  Family History[1]     Allergies  Penicillins, Povidone-iodine, and Streptomycin    Review of Systems  - CONSTITUTIONAL: Denies fever and chills.  - HEENT: Denies changes in vision and hearing.  - RESPIRATORY: Positive for SOB and cough, still recovering from recent pneumonia   - CV: Denies palpitations and CP.  - GI: see HPI  - : Denies dysuria and urinary frequency.  - MSK: Denies myalgia and joint pain.  - SKIN: Denies rash and pruritus.  - NEUROLOGICAL: Denies headache and syncope.  - PSYCHIATRIC: Denies recent changes in mood. Denies anxiety and depression.     Physical Exam  - GENERAL: Alert and oriented x 3. No acute distress. Well-nourished.  - EYES: EOMI. Anicteric.  - HENT: Moist mucous membranes. No scleral icterus. No cervical lymphadenopathy.  - LUNGS: Breathing comfortably on room air. No accessory muscle use, no distress.  - CARDIOVASCULAR: Regular rate and rhythm. No murmur. No JVD.  - ABDOMEN: Soft, non-tender and non-distended. Palpable right inguinal mass on valsalva.  "Easily reducible.   - EXTREMITIES: No edema. Non-tender.  - SKIN: No rashes or lesions. Warm.  - NEUROLOGIC: No focal neurological deficits. CN II-XII grossly intact, but not individually tested.  - PSYCHIATRIC: Cooperative. Appropriate mood and affect.    Last Recorded Vitals  Blood pressure 143/83, pulse 65, height 1.702 m (5' 7\"), weight 94.3 kg (208 lb), SpO2 95%.    Relevant Results  ECG 12 lead  Result Date: 7/9/2025  Normal sinus rhythm Low voltage QRS Septal infarct can not exclude When compared with ECG of 08-JUL-2025 08:01, (unconfirmed) No significant change was found Reconfirmed by Nikunj Barrett ([id number]) on 7/9/2025 5:59:25 PM    ECG 12 lead  Result Date: 7/8/2025  Normal sinus rhythm Low voltage QRS Septal infarct , can not exclude Reconfirmed by Nikunj Barrett ([id number]) on 7/8/2025 2:58:47 PM    XR chest 2 views  Result Date: 7/8/2025  Interpreted By:  Khalida Wagner, STUDY: XR CHEST 2 VIEWS;  7/8/2025 9:28 am   INDICATION: Signs/Symptoms:weakness.     COMPARISON: 07/02/2025   ACCESSION NUMBER(S): [id number]   ORDERING CLINICIAN: MARIO ARVIZU   FINDINGS: New small nodular right suprahilar infiltrate. No pleural effusion or pneumothorax. The cardiac silhouette is within normal limits for size. Degenerative endplate spurring in the thoracic spine.       Small nodular right suprahilar infiltrate consistent with pneumonia. Follow-up to ensure resolution after appropriate treatment recommended.   MACRO: None.   Signed by: Khalida Wagner 7/8/2025 9:36 AM Dictation workstation:   [id number]    CT abdomen pelvis w IV contrast  Result Date: 7/8/2025  Interpreted By:  Milad Pastor, STUDY: CT ABDOMEN PELVIS W IV CONTRAST;  7/8/2025 8:36 am   INDICATION: Signs/Symptoms:rlq pain.     COMPARISON: None.   ACCESSION NUMBER(S): [id number]   ORDERING CLINICIAN: JACKELYN MCCOY   TECHNIQUE: CT of the abdomen and pelvis was performed.  Standard contiguous axial images were obtained at 3 mm slice thickness through the " abdomen and pelvis. Coronal and sagittal reconstructions at 3 mm slice thickness were performed.  75 ML of Omnipaque 350 was administered intravenously without immediate complication.   FINDINGS: LOWER CHEST: There are partially visualized right lower lobe consolidative opacities. Otherwise there are trace bilateral pleural effusions and mild dependent atelectasis. The heart is normal in size without pericardial effusion. Visualized distal esophagus appears normal.   ABDOMEN:   LIVER: The liver is normal in size without evidence of focal liver lesions.   BILE DUCTS: The intrahepatic and extrahepatic ducts are not dilated.   GALLBLADDER: The gallbladder is surgically absent.   PANCREAS: The pancreas appears unremarkable without evidence of ductal dilatation or masses.   SPLEEN: The spleen is normal in size without focal lesions.   ADRENAL GLANDS: Bilateral adrenal glands appear normal.   KIDNEYS AND URETERS: The kidneys are normal in size and enhance symmetrically.  No hydroureteronephrosis or nephroureterolithiasis is identified.   PELVIS:   BLADDER: The urinary bladder appears normal without abnormal wall thickening.   REPRODUCTIVE ORGANS: The prostate is not enlarged.   BOWEL: The stomach is unremarkable.   The small and large bowel are normal in caliber and demonstrate no wall thickening.   The appendix appears normal.   VESSELS: There is no aneurysmal dilatation of the abdominal aorta. Mild calcified atherosclerotic changes of the abdominal aorta is seen. The IVC appears normal.   PERITONEUM/RETROPERITONEUM/LYMPH NODES: There is no free or loculated fluid collection, no free intraperitoneal air. The retroperitoneum appears normal.  No abdominopelvic lymphadenopathy is present.   BONES AND ABDOMINAL WALL: No suspicious osseous lesions are identified.  There is a moderate-sized fat containing right inguinal hernia without significant inflammatory fat stranding.       1. Moderate-sized fat containing right inguinal  hernia without significant associated inflammation. 2. Partially visualized right lower lobe consolidative opacities concerning for aspiration and/or pneumonia in the appropriate clinical setting.       MACRO: None   Signed by: Milad Pastor 7/8/2025 9:02 AM Dictation workstation:   [id number]    ECG 12 lead  Result Date: 7/3/2025  Normal sinus rhythm Normal ECG When compared with ECG of 27-DEC-2023 14:41, No significant change was found    XR chest 2 views  Result Date: 7/2/2025  Interpreted By:  Homero Choudhary, STUDY: XR CHEST 2 VIEWS;  7/2/2025 9:30 am   INDICATION: Signs/Symptoms:cough.   COMPARISON: CT scan abdomen and pelvis from 03/20/2024. Chest x-rays, most recent from 12/28/2022.   ACCESSION NUMBER(S): [id number]   ORDERING CLINICIAN: MILO ERAZO   TECHNIQUE: PA and lateral views of the chest were obtained.   FINDINGS: MEDIASTINUM/LUNGS/LUISA: No cardiomegaly, vascular congestion, or pleural effusion. No abnormal opacity in either lung worrisome for tumor or pneumonia. No pneumothorax. No tracheal deviation. No abnormal hilar fullness or gross mass on either side.   BONES: No lytic or blastic destructive bone lesion.  There is moderate disc space narrowing and endplate osteophytosis throughout the thoracic spine.   UPPER ABDOMEN: Grossly intact.       DJD in the thoracic spine as described.   Otherwise, negative exam.   MACRO: None   Signed by: Homero Choudhary 7/2/2025 9:41 AM Dictation workstation:   [id number]    XR DEXA bone density  Result Date: 6/25/2025  Interpreted By:  Vaughn Flowers and Admin Dexa STUDY: DEXA BONE DENSITY6/25/2025 12:42 pm   INDICATION: Signs/Symptoms:Chronic steroid use with chronic pain/osteopenia.  The patient is a 70 y/o  year old M.     COMPARISON: None.   ACCESSION NUMBER(S): [id number]   ORDERING CLINICIAN: SLY JEAN   TECHNIQUE: DEXA BONE DENSITY   FINDINGS:   Name: Adrian Rosa Patient ID: [id number] YOB: 1955 Height: 67.0 in. Gender:     Male      Exam Date:  06/25/2025 Weight: 220.0 lbs. Indications: Fractures: Treatments:  Vitamin D LEFT FEMUR - TOTAL The bone mineral density : 1.006 g/cm2 T-score : -0.7    % of young normal mean :91% Z-score : -0.4    % of age matched mean : 95% % change vs. Previous : -     % change vs. Baseline : baseline *Indicates significant change based on 95% confidence interval. LEFT FEMUR - NECK The bone mineral density : 0.916 g/cm2 T-score : -1.2    % of young normal mean: 86% Z-score : -0.4    % of age matched mean : 94% % change vs. Previous : -     % change vs. Baseline : baseline *Indicates significant change based on 95% confidence interval. SPINE L1-L4 The bone mineral density is 1.493 g/cm2 T-score : 2.3   % of young normal mean is 122% Z-score : 2.1    % of age matched mean is 120% % change vs. Previous : -     % change vs. Baseline : baseline *Indicates significant change based on 95% confidence interval. World Health Organization (WHO) criteria for post-menopausal,  Women: Normal:       T-score at or above -1 SD Osteopenia:   T-score between -1 and -2.5 SD Osteoporosis: T-score at or below -2.5 SD 10-Year Fracture Risk: Major Osteoporotic Fracture 5.4% Hip Fracture                1.0% Reported Risk Factors       None Interpretation: The BMD measured at Femur Neck is 0.916 g/cm2 with a T-score of -1.2 is considered moderately low.  Fracture risk is moderate.  Treatment is advised if there are other risk factors. Follow-up exam recommended on June 2027 or sooner as clinically warranted.       According to World Health Organization criteria, classification is low bone mass (osteopenia)   Followup recommended in two years or sooner as clinically warranted.   FRAX and Z-Score calculations include ethnicity in determining the score for these values, as determined by organizations such as the NIH, WHO and NOF.   MACRO: None   Signed by: Vaughn Flowers 6/25/2025 1:06 PM Dictation workstation:   [id number]        Assessment and Plan  Patient is a 69 y.o. male with PMH significant for HLD, hypothyroidism, and Addisons disease on prednisone who presents for evaluation of a symptomatic right inguinal hernia found incidentally on CT. Patient would like to pursue surgery in a couple of months once he has recovered from recent pneumonia.     Plan:   - Laparoscopic right inguinal hernia repair at [facility] in September     Discussed with Dr. Morel.     MD Kevin Fajardo MD           [1]   Family History  Problem Relation Name Age of Onset    Ovarian cancer Mother      Peripheral vascular disease Mother      COPD Mother      Lung cancer Mother      Heart attack Mother      COPD Father      Heart failure Father      Heart attack Father      Heart attack Brother

## 2025-07-15 ENCOUNTER — HOSPITAL ENCOUNTER (INPATIENT)
Facility: HOSPITAL | Age: 70
LOS: 4 days | Discharge: HOME | DRG: 175 | End: 2025-07-19
Attending: EMERGENCY MEDICINE
Payer: MEDICARE

## 2025-07-15 ENCOUNTER — APPOINTMENT (OUTPATIENT)
Dept: RADIOLOGY | Facility: HOSPITAL | Age: 70
DRG: 175 | End: 2025-07-15
Payer: MEDICARE

## 2025-07-15 ENCOUNTER — APPOINTMENT (OUTPATIENT)
Dept: CARDIOLOGY | Facility: HOSPITAL | Age: 70
DRG: 175 | End: 2025-07-15
Payer: MEDICARE

## 2025-07-15 DIAGNOSIS — I26.94 MULTIPLE SUBSEGMENTAL PULMONARY EMBOLI WITHOUT ACUTE COR PULMONALE: ICD-10-CM

## 2025-07-15 DIAGNOSIS — E87.1 HYPONATREMIA: ICD-10-CM

## 2025-07-15 DIAGNOSIS — J18.9 PNEUMONIA OF BOTH LUNGS DUE TO INFECTIOUS ORGANISM, UNSPECIFIED PART OF LUNG: Primary | ICD-10-CM

## 2025-07-15 DIAGNOSIS — I26.99 OTHER PULMONARY EMBOLISM WITHOUT ACUTE COR PULMONALE: ICD-10-CM

## 2025-07-15 DIAGNOSIS — E87.1 ACUTE HYPONATREMIA: ICD-10-CM

## 2025-07-15 DIAGNOSIS — E27.1 ADDISON'S DISEASE (MULTI): ICD-10-CM

## 2025-07-15 DIAGNOSIS — H93.8X2 SENSATION OF FULLNESS IN LEFT EAR: ICD-10-CM

## 2025-07-15 DIAGNOSIS — I82.452 ACUTE DEEP VEIN THROMBOSIS (DVT) OF LEFT PERONEAL VEIN (MULTI): ICD-10-CM

## 2025-07-15 DIAGNOSIS — R06.02 SHORTNESS OF BREATH: ICD-10-CM

## 2025-07-15 DIAGNOSIS — M79.89 LOCALIZED SWELLING OF LOWER EXTREMITY: ICD-10-CM

## 2025-07-15 LAB
ALBUMIN SERPL BCP-MCNC: 3.5 G/DL (ref 3.4–5)
ALP SERPL-CCNC: 62 U/L (ref 33–136)
ALT SERPL W P-5'-P-CCNC: 37 U/L (ref 10–52)
ANION GAP BLDA CALCULATED.4IONS-SCNC: 10 MMO/L (ref 10–25)
ANION GAP SERPL CALC-SCNC: 13 MMOL/L (ref 10–20)
APPARATUS: ABNORMAL
ARTERIAL PATENCY WRIST A: ABNORMAL
AST SERPL W P-5'-P-CCNC: 32 U/L (ref 9–39)
BASE EXCESS BLDA CALC-SCNC: -2.5 MMOL/L (ref -2–3)
BASOPHILS # BLD AUTO: 0.04 X10*3/UL (ref 0–0.1)
BASOPHILS NFR BLD AUTO: 0.2 %
BILIRUB SERPL-MCNC: 0.6 MG/DL (ref 0–1.2)
BNP SERPL-MCNC: 11 PG/ML (ref 0–99)
BODY TEMPERATURE: ABNORMAL
BUN SERPL-MCNC: 23 MG/DL (ref 6–23)
CA-I BLDA-SCNC: 1.12 MMOL/L (ref 1.1–1.33)
CALCIUM SERPL-MCNC: 9 MG/DL (ref 8.6–10.3)
CARDIAC TROPONIN I PNL SERPL HS: 5 NG/L (ref 0–20)
CARDIAC TROPONIN I PNL SERPL HS: 6 NG/L (ref 0–20)
CHLORIDE BLDA-SCNC: 99 MMOL/L (ref 98–107)
CHLORIDE SERPL-SCNC: 95 MMOL/L (ref 98–107)
CO2 SERPL-SCNC: 23 MMOL/L (ref 21–32)
CREAT SERPL-MCNC: 1.27 MG/DL (ref 0.5–1.3)
EGFRCR SERPLBLD CKD-EPI 2021: 61 ML/MIN/1.73M*2
EOSINOPHIL # BLD AUTO: 0.29 X10*3/UL (ref 0–0.7)
EOSINOPHIL NFR BLD AUTO: 1.7 %
ERYTHROCYTE [DISTWIDTH] IN BLOOD BY AUTOMATED COUNT: 12.4 % (ref 11.5–14.5)
FLOW: 8 LPM
FLUAV RNA RESP QL NAA+PROBE: NOT DETECTED
FLUBV RNA RESP QL NAA+PROBE: NOT DETECTED
GLUCOSE BLDA-MCNC: 105 MG/DL (ref 74–99)
GLUCOSE SERPL-MCNC: 88 MG/DL (ref 74–99)
HCO3 BLDA-SCNC: 19.7 MMOL/L (ref 22–26)
HCT VFR BLD AUTO: 42.7 % (ref 41–52)
HCT VFR BLD EST: 43 % (ref 41–52)
HGB BLD-MCNC: 14.7 G/DL (ref 13.5–17.5)
HGB BLDA-MCNC: 14.3 G/DL (ref 13.5–17.5)
IMM GRANULOCYTES # BLD AUTO: 0.13 X10*3/UL (ref 0–0.7)
IMM GRANULOCYTES NFR BLD AUTO: 0.8 % (ref 0–0.9)
INHALED O2 CONCENTRATION: 50 %
INR PPP: 1.3 (ref 0.9–1.1)
LACTATE BLDA-SCNC: 0.7 MMOL/L (ref 0.4–2)
LYMPHOCYTES # BLD AUTO: 4.43 X10*3/UL (ref 1.2–4.8)
LYMPHOCYTES NFR BLD AUTO: 26.5 %
MCH RBC QN AUTO: 31.1 PG (ref 26–34)
MCHC RBC AUTO-ENTMCNC: 34.4 G/DL (ref 32–36)
MCV RBC AUTO: 91 FL (ref 80–100)
MONOCYTES # BLD AUTO: 1.81 X10*3/UL (ref 0.1–1)
MONOCYTES NFR BLD AUTO: 10.8 %
MRSA DNA SPEC QL NAA+PROBE: NOT DETECTED
NEUTROPHILS # BLD AUTO: 10.03 X10*3/UL (ref 1.2–7.7)
NEUTROPHILS NFR BLD AUTO: 60 %
NRBC BLD-RTO: 0 /100 WBCS (ref 0–0)
OXYHGB MFR BLDA: 94.3 % (ref 94–98)
PCO2 BLDA: 27 MM HG (ref 38–42)
PH BLDA: 7.47 PH (ref 7.38–7.42)
PLATELET # BLD AUTO: 508 X10*3/UL (ref 150–450)
PO2 BLDA: 74 MM HG (ref 85–95)
POTASSIUM BLDA-SCNC: 4.4 MMOL/L (ref 3.5–5.3)
POTASSIUM SERPL-SCNC: 4.8 MMOL/L (ref 3.5–5.3)
PROT SERPL-MCNC: 8.6 G/DL (ref 6.4–8.2)
PROTHROMBIN TIME: 14.6 SECONDS (ref 9.8–12.4)
RBC # BLD AUTO: 4.72 X10*6/UL (ref 4.5–5.9)
RSV RNA RESP QL NAA+PROBE: NOT DETECTED
SAO2 % BLDA: 96 % (ref 94–100)
SARS-COV-2 RNA RESP QL NAA+PROBE: NOT DETECTED
SITE OF ARTERIAL PUNCTURE: ABNORMAL
SODIUM BLDA-SCNC: 124 MMOL/L (ref 136–145)
SODIUM SERPL-SCNC: 126 MMOL/L (ref 136–145)
WBC # BLD AUTO: 16.7 X10*3/UL (ref 4.4–11.3)

## 2025-07-15 PROCEDURE — 84145 PROCALCITONIN (PCT): CPT | Mod: STJLAB

## 2025-07-15 PROCEDURE — 2500000001 HC RX 250 WO HCPCS SELF ADMINISTERED DRUGS (ALT 637 FOR MEDICARE OP)

## 2025-07-15 PROCEDURE — 2500000004 HC RX 250 GENERAL PHARMACY W/ HCPCS (ALT 636 FOR OP/ED)

## 2025-07-15 PROCEDURE — 96367 TX/PROPH/DG ADDL SEQ IV INF: CPT

## 2025-07-15 PROCEDURE — 99285 EMERGENCY DEPT VISIT HI MDM: CPT

## 2025-07-15 PROCEDURE — 83880 ASSAY OF NATRIURETIC PEPTIDE: CPT | Performed by: EMERGENCY MEDICINE

## 2025-07-15 PROCEDURE — 85025 COMPLETE CBC W/AUTO DIFF WBC: CPT | Performed by: EMERGENCY MEDICINE

## 2025-07-15 PROCEDURE — 71045 X-RAY EXAM CHEST 1 VIEW: CPT | Performed by: RADIOLOGY

## 2025-07-15 PROCEDURE — 2500000002 HC RX 250 W HCPCS SELF ADMINISTERED DRUGS (ALT 637 FOR MEDICARE OP, ALT 636 FOR OP/ED)

## 2025-07-15 PROCEDURE — 93005 ELECTROCARDIOGRAM TRACING: CPT

## 2025-07-15 PROCEDURE — 84132 ASSAY OF SERUM POTASSIUM: CPT

## 2025-07-15 PROCEDURE — 87632 RESP VIRUS 6-11 TARGETS: CPT

## 2025-07-15 PROCEDURE — 96365 THER/PROPH/DIAG IV INF INIT: CPT

## 2025-07-15 PROCEDURE — 84484 ASSAY OF TROPONIN QUANT: CPT | Performed by: EMERGENCY MEDICINE

## 2025-07-15 PROCEDURE — 71045 X-RAY EXAM CHEST 1 VIEW: CPT

## 2025-07-15 PROCEDURE — 96366 THER/PROPH/DIAG IV INF ADDON: CPT

## 2025-07-15 PROCEDURE — 71275 CT ANGIOGRAPHY CHEST: CPT

## 2025-07-15 PROCEDURE — 85610 PROTHROMBIN TIME: CPT | Performed by: EMERGENCY MEDICINE

## 2025-07-15 PROCEDURE — 36415 COLL VENOUS BLD VENIPUNCTURE: CPT | Performed by: EMERGENCY MEDICINE

## 2025-07-15 PROCEDURE — 96375 TX/PRO/DX INJ NEW DRUG ADDON: CPT

## 2025-07-15 PROCEDURE — 2500000005 HC RX 250 GENERAL PHARMACY W/O HCPCS

## 2025-07-15 PROCEDURE — 99223 1ST HOSP IP/OBS HIGH 75: CPT

## 2025-07-15 PROCEDURE — 36600 WITHDRAWAL OF ARTERIAL BLOOD: CPT

## 2025-07-15 PROCEDURE — 80053 COMPREHEN METABOLIC PANEL: CPT | Performed by: EMERGENCY MEDICINE

## 2025-07-15 PROCEDURE — 2550000001 HC RX 255 CONTRASTS

## 2025-07-15 PROCEDURE — 99285 EMERGENCY DEPT VISIT HI MDM: CPT | Mod: 25 | Performed by: EMERGENCY MEDICINE

## 2025-07-15 PROCEDURE — 87640 STAPH A DNA AMP PROBE: CPT

## 2025-07-15 PROCEDURE — 87449 NOS EACH ORGANISM AG IA: CPT | Mod: STJLAB

## 2025-07-15 PROCEDURE — 94640 AIRWAY INHALATION TREATMENT: CPT

## 2025-07-15 PROCEDURE — 71275 CT ANGIOGRAPHY CHEST: CPT | Performed by: RADIOLOGY

## 2025-07-15 PROCEDURE — 87899 AGENT NOS ASSAY W/OPTIC: CPT | Mod: STJLAB

## 2025-07-15 PROCEDURE — 1200000002 HC GENERAL ROOM WITH TELEMETRY DAILY

## 2025-07-15 PROCEDURE — 87075 CULTR BACTERIA EXCEPT BLOOD: CPT | Mod: STJLAB

## 2025-07-15 PROCEDURE — 87637 SARSCOV2&INF A&B&RSV AMP PRB: CPT

## 2025-07-15 RX ORDER — ONDANSETRON HYDROCHLORIDE 2 MG/ML
4 INJECTION, SOLUTION INTRAVENOUS ONCE
Status: COMPLETED | OUTPATIENT
Start: 2025-07-15 | End: 2025-07-15

## 2025-07-15 RX ORDER — HYDROCORTISONE 5 MG/1
25 TABLET ORAL EVERY 8 HOURS SCHEDULED
Status: DISCONTINUED | OUTPATIENT
Start: 2025-07-16 | End: 2025-07-17

## 2025-07-15 RX ORDER — CEFTRIAXONE 2 G/50ML
2 INJECTION, SOLUTION INTRAVENOUS ONCE
Status: COMPLETED | OUTPATIENT
Start: 2025-07-15 | End: 2025-07-15

## 2025-07-15 RX ORDER — ENOXAPARIN SODIUM 100 MG/ML
40 INJECTION SUBCUTANEOUS EVERY 24 HOURS
Status: DISCONTINUED | OUTPATIENT
Start: 2025-07-15 | End: 2025-07-16

## 2025-07-15 RX ORDER — VANCOMYCIN HYDROCHLORIDE 1 G/20ML
INJECTION, POWDER, LYOPHILIZED, FOR SOLUTION INTRAVENOUS DAILY PRN
Status: DISCONTINUED | OUTPATIENT
Start: 2025-07-15 | End: 2025-07-16

## 2025-07-15 RX ORDER — IPRATROPIUM BROMIDE AND ALBUTEROL SULFATE 2.5; .5 MG/3ML; MG/3ML
3 SOLUTION RESPIRATORY (INHALATION) EVERY 20 MIN
Status: COMPLETED | OUTPATIENT
Start: 2025-07-15 | End: 2025-07-15

## 2025-07-15 RX ORDER — IPRATROPIUM BROMIDE AND ALBUTEROL SULFATE 2.5; .5 MG/3ML; MG/3ML
3 SOLUTION RESPIRATORY (INHALATION)
Status: DISCONTINUED | OUTPATIENT
Start: 2025-07-15 | End: 2025-07-15

## 2025-07-15 RX ORDER — ALBUTEROL SULFATE 0.83 MG/ML
2.5 SOLUTION RESPIRATORY (INHALATION) EVERY 2 HOUR PRN
Status: DISCONTINUED | OUTPATIENT
Start: 2025-07-15 | End: 2025-07-16

## 2025-07-15 RX ORDER — BUDESONIDE 0.25 MG/2ML
0.25 INHALANT ORAL
Status: DISCONTINUED | OUTPATIENT
Start: 2025-07-15 | End: 2025-07-19 | Stop reason: HOSPADM

## 2025-07-15 RX ORDER — MORPHINE SULFATE 4 MG/ML
4 INJECTION, SOLUTION INTRAMUSCULAR; INTRAVENOUS ONCE
Status: COMPLETED | OUTPATIENT
Start: 2025-07-15 | End: 2025-07-15

## 2025-07-15 RX ORDER — OXYCODONE HYDROCHLORIDE 5 MG/1
5 TABLET ORAL EVERY 4 HOURS PRN
Refills: 0 | Status: DISCONTINUED | OUTPATIENT
Start: 2025-07-15 | End: 2025-07-19 | Stop reason: HOSPADM

## 2025-07-15 RX ORDER — ACETAMINOPHEN 325 MG/1
975 TABLET ORAL EVERY 8 HOURS PRN
Status: DISCONTINUED | OUTPATIENT
Start: 2025-07-15 | End: 2025-07-19 | Stop reason: HOSPADM

## 2025-07-15 RX ORDER — POLYETHYLENE GLYCOL 3350 17 G/17G
17 POWDER, FOR SOLUTION ORAL DAILY
Status: DISCONTINUED | OUTPATIENT
Start: 2025-07-16 | End: 2025-07-19 | Stop reason: HOSPADM

## 2025-07-15 RX ORDER — CEFEPIME HYDROCHLORIDE 2 G/50ML
2 INJECTION, SOLUTION INTRAVENOUS EVERY 8 HOURS
Status: DISCONTINUED | OUTPATIENT
Start: 2025-07-15 | End: 2025-07-16

## 2025-07-15 RX ORDER — IPRATROPIUM BROMIDE AND ALBUTEROL SULFATE 2.5; .5 MG/3ML; MG/3ML
3 SOLUTION RESPIRATORY (INHALATION) 4 TIMES DAILY
Status: DISCONTINUED | OUTPATIENT
Start: 2025-07-16 | End: 2025-07-16

## 2025-07-15 RX ORDER — ALBUTEROL SULFATE 0.83 MG/ML
2.5 SOLUTION RESPIRATORY (INHALATION) EVERY 4 HOURS PRN
Status: DISCONTINUED | OUTPATIENT
Start: 2025-07-15 | End: 2025-07-15

## 2025-07-15 RX ORDER — ONDANSETRON HYDROCHLORIDE 2 MG/ML
4 INJECTION, SOLUTION INTRAVENOUS EVERY 4 HOURS PRN
Status: DISCONTINUED | OUTPATIENT
Start: 2025-07-15 | End: 2025-07-19 | Stop reason: HOSPADM

## 2025-07-15 RX ORDER — METRONIDAZOLE 500 MG/100ML
500 INJECTION, SOLUTION INTRAVENOUS EVERY 8 HOURS
Status: DISCONTINUED | OUTPATIENT
Start: 2025-07-15 | End: 2025-07-16

## 2025-07-15 RX ADMIN — Medication 2 L/MIN: at 21:55

## 2025-07-15 RX ADMIN — IPRATROPIUM BROMIDE AND ALBUTEROL SULFATE 3 ML: 2.5; .5 SOLUTION RESPIRATORY (INHALATION) at 17:12

## 2025-07-15 RX ADMIN — AZITHROMYCIN MONOHYDRATE 500 MG: 500 INJECTION, POWDER, LYOPHILIZED, FOR SOLUTION INTRAVENOUS at 18:28

## 2025-07-15 RX ADMIN — Medication 2 L/MIN: at 21:38

## 2025-07-15 RX ADMIN — IPRATROPIUM BROMIDE AND ALBUTEROL SULFATE 3 ML: 2.5; .5 SOLUTION RESPIRATORY (INHALATION) at 17:16

## 2025-07-15 RX ADMIN — OXYCODONE HYDROCHLORIDE 5 MG: 5 TABLET ORAL at 21:47

## 2025-07-15 RX ADMIN — IOHEXOL 60 ML: 350 INJECTION, SOLUTION INTRAVENOUS at 22:52

## 2025-07-15 RX ADMIN — ENOXAPARIN SODIUM 40 MG: 40 INJECTION SUBCUTANEOUS at 21:47

## 2025-07-15 RX ADMIN — METRONIDAZOLE 500 MG: 500 INJECTION, SOLUTION INTRAVENOUS at 21:46

## 2025-07-15 RX ADMIN — IPRATROPIUM BROMIDE AND ALBUTEROL SULFATE 3 ML: 2.5; .5 SOLUTION RESPIRATORY (INHALATION) at 17:15

## 2025-07-15 RX ADMIN — IPRATROPIUM BROMIDE AND ALBUTEROL SULFATE 3 ML: 2.5; .5 SOLUTION RESPIRATORY (INHALATION) at 21:38

## 2025-07-15 RX ADMIN — CEFTRIAXONE 2 G: 2 INJECTION, SOLUTION INTRAVENOUS at 16:59

## 2025-07-15 RX ADMIN — CEFEPIME HYDROCHLORIDE 2 G: 2 INJECTION, SOLUTION INTRAVENOUS at 23:25

## 2025-07-15 RX ADMIN — BUDESONIDE 0.25 MG: 0.25 INHALANT RESPIRATORY (INHALATION) at 21:38

## 2025-07-15 RX ADMIN — ONDANSETRON 4 MG: 2 INJECTION INTRAMUSCULAR; INTRAVENOUS at 16:59

## 2025-07-15 RX ADMIN — MORPHINE SULFATE 4 MG: 4 INJECTION, SOLUTION INTRAMUSCULAR; INTRAVENOUS at 16:59

## 2025-07-15 SDOH — ECONOMIC STABILITY: FOOD INSECURITY: WITHIN THE PAST 12 MONTHS, YOU WORRIED THAT YOUR FOOD WOULD RUN OUT BEFORE YOU GOT THE MONEY TO BUY MORE.: NEVER TRUE

## 2025-07-15 SDOH — SOCIAL STABILITY: SOCIAL INSECURITY: WITHIN THE LAST YEAR, HAVE YOU BEEN HUMILIATED OR EMOTIONALLY ABUSED IN OTHER WAYS BY YOUR PARTNER OR EX-PARTNER?: NO

## 2025-07-15 SDOH — ECONOMIC STABILITY: FOOD INSECURITY: WITHIN THE PAST 12 MONTHS, THE FOOD YOU BOUGHT JUST DIDN'T LAST AND YOU DIDN'T HAVE MONEY TO GET MORE.: NEVER TRUE

## 2025-07-15 SDOH — SOCIAL STABILITY: SOCIAL INSECURITY: WITHIN THE LAST YEAR, HAVE YOU BEEN AFRAID OF YOUR PARTNER OR EX-PARTNER?: NO

## 2025-07-15 SDOH — SOCIAL STABILITY: SOCIAL INSECURITY: ARE THERE ANY APPARENT SIGNS OF INJURIES/BEHAVIORS THAT COULD BE RELATED TO ABUSE/NEGLECT?: NO

## 2025-07-15 SDOH — SOCIAL STABILITY: SOCIAL INSECURITY: HAS ANYONE EVER THREATENED TO HURT YOUR FAMILY OR YOUR PETS?: NO

## 2025-07-15 SDOH — SOCIAL STABILITY: SOCIAL INSECURITY: DOES ANYONE TRY TO KEEP YOU FROM HAVING/CONTACTING OTHER FRIENDS OR DOING THINGS OUTSIDE YOUR HOME?: NO

## 2025-07-15 SDOH — ECONOMIC STABILITY: INCOME INSECURITY: IN THE PAST 12 MONTHS HAS THE ELECTRIC, GAS, OIL, OR WATER COMPANY THREATENED TO SHUT OFF SERVICES IN YOUR HOME?: NO

## 2025-07-15 SDOH — SOCIAL STABILITY: SOCIAL INSECURITY: HAVE YOU HAD ANY THOUGHTS OF HARMING ANYONE ELSE?: NO

## 2025-07-15 SDOH — SOCIAL STABILITY: SOCIAL INSECURITY: DO YOU FEEL ANYONE HAS EXPLOITED OR TAKEN ADVANTAGE OF YOU FINANCIALLY OR OF YOUR PERSONAL PROPERTY?: NO

## 2025-07-15 SDOH — SOCIAL STABILITY: SOCIAL INSECURITY: ARE YOU OR HAVE YOU BEEN THREATENED OR ABUSED PHYSICALLY, EMOTIONALLY, OR SEXUALLY BY ANYONE?: NO

## 2025-07-15 SDOH — SOCIAL STABILITY: SOCIAL INSECURITY: ABUSE: ADULT

## 2025-07-15 SDOH — SOCIAL STABILITY: SOCIAL INSECURITY: WERE YOU ABLE TO COMPLETE ALL THE BEHAVIORAL HEALTH SCREENINGS?: YES

## 2025-07-15 SDOH — SOCIAL STABILITY: SOCIAL INSECURITY: DO YOU FEEL UNSAFE GOING BACK TO THE PLACE WHERE YOU ARE LIVING?: NO

## 2025-07-15 SDOH — SOCIAL STABILITY: SOCIAL INSECURITY: HAVE YOU HAD THOUGHTS OF HARMING ANYONE ELSE?: NO

## 2025-07-15 ASSESSMENT — COGNITIVE AND FUNCTIONAL STATUS - GENERAL
MOBILITY SCORE: 24
DAILY ACTIVITIY SCORE: 24
PATIENT BASELINE BEDBOUND: NO

## 2025-07-15 ASSESSMENT — PATIENT HEALTH QUESTIONNAIRE - PHQ9
1. LITTLE INTEREST OR PLEASURE IN DOING THINGS: NOT AT ALL
2. FEELING DOWN, DEPRESSED OR HOPELESS: NOT AT ALL
SUM OF ALL RESPONSES TO PHQ9 QUESTIONS 1 & 2: 0

## 2025-07-15 ASSESSMENT — PAIN DESCRIPTION - DESCRIPTORS
DESCRIPTORS: ACHING;DISCOMFORT
DESCRIPTORS: SHARP

## 2025-07-15 ASSESSMENT — PAIN DESCRIPTION - LOCATION
LOCATION: CHEST
LOCATION: OTHER (COMMENT)

## 2025-07-15 ASSESSMENT — ACTIVITIES OF DAILY LIVING (ADL)
BATHING: INDEPENDENT
TOILETING: INDEPENDENT
ADEQUATE_TO_COMPLETE_ADL: YES
HEARING - RIGHT EAR: FUNCTIONAL
PATIENT'S MEMORY ADEQUATE TO SAFELY COMPLETE DAILY ACTIVITIES?: YES
FEEDING YOURSELF: INDEPENDENT
HEARING - LEFT EAR: FUNCTIONAL
LACK_OF_TRANSPORTATION: NO
GROOMING: INDEPENDENT
WALKS IN HOME: INDEPENDENT
JUDGMENT_ADEQUATE_SAFELY_COMPLETE_DAILY_ACTIVITIES: YES
DRESSING YOURSELF: INDEPENDENT

## 2025-07-15 ASSESSMENT — LIFESTYLE VARIABLES
HOW OFTEN DO YOU HAVE 6 OR MORE DRINKS ON ONE OCCASION: NEVER
TOTAL SCORE: 0
AUDIT-C TOTAL SCORE: 1
EVER FELT BAD OR GUILTY ABOUT YOUR DRINKING: NO
EVER HAD A DRINK FIRST THING IN THE MORNING TO STEADY YOUR NERVES TO GET RID OF A HANGOVER: NO
HOW MANY STANDARD DRINKS CONTAINING ALCOHOL DO YOU HAVE ON A TYPICAL DAY: 1 OR 2
HOW OFTEN DO YOU HAVE A DRINK CONTAINING ALCOHOL: MONTHLY OR LESS
SKIP TO QUESTIONS 9-10: 1
HAVE YOU EVER FELT YOU SHOULD CUT DOWN ON YOUR DRINKING: NO
HAVE PEOPLE ANNOYED YOU BY CRITICIZING YOUR DRINKING: NO
AUDIT-C TOTAL SCORE: 1

## 2025-07-15 ASSESSMENT — PAIN DESCRIPTION - ORIENTATION
ORIENTATION: LEFT

## 2025-07-15 ASSESSMENT — PAIN DESCRIPTION - PAIN TYPE
TYPE: ACUTE PAIN
TYPE: ACUTE PAIN

## 2025-07-15 ASSESSMENT — PAIN - FUNCTIONAL ASSESSMENT
PAIN_FUNCTIONAL_ASSESSMENT: 0-10

## 2025-07-15 ASSESSMENT — PAIN SCALES - GENERAL
PAINLEVEL_OUTOF10: 10 - WORST POSSIBLE PAIN
PAINLEVEL_OUTOF10: 5 - MODERATE PAIN
PAINLEVEL_OUTOF10: 7
PAINLEVEL_OUTOF10: 3

## 2025-07-15 NOTE — ED PROVIDER NOTES
History of Present Illness       History provided by: Patient  Limitations to History: None  External Records Reviewed with Brief Summary: Notes reviewed in patient's chart    HPI:  HPI     69 y.o. male with PMH significant for prediabetes, HLD, hypothyroidism, BPH, spinal stenosis, and Addisons disease presenting to the ED for left-sided pain.  Patient states he was recently admitted and discharged home in which she finished his azithromycin and cefdinir and felt improved.  States at 3 AM last night he developed pain starting his left clavicle spreading down to his abdomen.  States he is unable to lay down due to shortness of breath and feeling of suffocation.  Endorsing a dry cough.  States the left-sided pain is worse with breathing or motion.  Denies nausea/vomiting, urinary symptoms, hemoptysis, previous blood clot, ear pain.    Physical Exam   Physical Exam  HENT:      Head: Normocephalic and atraumatic.      Right Ear: Tympanic membrane is erythematous.      Left Ear: Tympanic membrane is erythematous.      Nose: Nose normal.      Mouth/Throat:      Mouth: Mucous membranes are moist.   Eyes:      Extraocular Movements: Extraocular movements intact.   Cardiovascular:      Rate and Rhythm: Normal rate and regular rhythm.   Pulmonary:      Effort: Pulmonary effort is normal.      Breath sounds: Normal breath sounds. No wheezing.   Chest:      Comments: Tender to palpation of the left side of the chest spreading from clavicle down over breast into the left upper abdomen without overlying skin changes.  Abdominal:      Tenderness: There is no abdominal tenderness.   Musculoskeletal:      Cervical back: Normal and normal range of motion. No tenderness.      Thoracic back: Normal. No tenderness.      Lumbar back: Normal. No tenderness.   Skin:     General: Skin is warm.   Neurological:      General: No focal deficit present.      Mental Status: He is alert and oriented to person, place, and time.   Psychiatric:          Mood and Affect: Mood normal.         Behavior: Behavior normal.         Thought Content: Thought content normal.          Triage vitals:  T 36.3 °C (97.3 °F)  HR 68  /74  RR (!) 21  O2 95 % None (Room air)    Medical Decision Making & ED Course     ED Course & MDM       Medical Decision Making:  Medical Decision Making  69 y.o. male with PMH significant for prediabetes, HLD, hypothyroidism, BPH, spinal stenosis, and Addisons disease presenting to the ED for left-sided pain starting from left clavicle down to abdomen.  Upon arrival to the ED patient is alert and oriented in mild distress due to pain.  Respiration rate noted to be 21 otherwise vitally stable and afebrile.  On exam patient has normal heart sounds and breath sounds bilateral.  Tender to palpation over the left chest spreading from the left clavicle down to left abdomen without overlying skin changes.  Bilateral ears with slightly erythematous TMs.    Workup while in the ED demonstrating CMP notable for hypokalemia, will obtain Legionella antigen.  Troponin 6, 5.  EKG read below.  CBC with leukocytosis at 16.7.  BMP within limits.  ABG demonstrating pH 7.47, CO2 27, bicarb 19.7.  chest x-ray with concern of bilateral parenchymal infiltration; started on ceftriaxone and azithromycin.  Administered DuoNebs, morphine, Zofran for symptom management.  Considered obtaining CT PE earlier upon patient arrival although not ordered due to concern for patient being not able to lay flat due to sensation of suffocation.    At this time patient has been accepted to general medicine under Dr. Lion due to concern for multifocal pneumonia, shortness of breath, hyponatremia.  Discussed with attending, recommended order for CT angio PE in which admission team will follow.      Differential diagnoses considered include but are not limited to: CT PE, pneumonia, electrolyte abnormality, ACS, CHF       EKG Independent Interpretation:  EKG per my read shows  "normal sinus rhythm, 67 bpm, , QRS 84, QTc 424.  Normal axis.  No acute ST elevation or depression.  Repeat EKG at 5:08 demonstrating normal sinus rhythm, 61 bpm, , QRS 82, QTc 416.  Normal axis.  No acute ST elevation or depression.  T wave inversion noted in lead III, aVF; not prev  The patient was discussed with the following consultants/services: Admission Coordinator who accepted the patient for admission        Visit Vitals  /61   Pulse 62   Temp 36.3 °C (97.3 °F) (Temporal)   Resp 20   Ht 1.702 m (5' 7\")   Wt 94.3 kg (208 lb)   SpO2 (!) 93%   BMI 32.58 kg/m²   Smoking Status Former   BSA 2.11 m²        Labs Reviewed   CBC WITH AUTO DIFFERENTIAL - Abnormal       Result Value    WBC 16.7 (*)     nRBC 0.0      RBC 4.72      Hemoglobin 14.7      Hematocrit 42.7      MCV 91      MCH 31.1      MCHC 34.4      RDW 12.4      Platelets 508 (*)     Neutrophils % 60.0      Immature Granulocytes %, Automated 0.8      Lymphocytes % 26.5      Monocytes % 10.8      Eosinophils % 1.7      Basophils % 0.2      Neutrophils Absolute 10.03 (*)     Immature Granulocytes Absolute, Automated 0.13      Lymphocytes Absolute 4.43      Monocytes Absolute 1.81 (*)     Eosinophils Absolute 0.29      Basophils Absolute 0.04     COMPREHENSIVE METABOLIC PANEL - Abnormal    Glucose 88      Sodium 126 (*)     Potassium 4.8      Chloride 95 (*)     Bicarbonate 23      Anion Gap 13      Urea Nitrogen 23      Creatinine 1.27      eGFR 61      Calcium 9.0      Albumin 3.5      Alkaline Phosphatase 62      Total Protein 8.6 (*)     AST 32      Bilirubin, Total 0.6      ALT 37     PROTIME-INR - Abnormal    Protime 14.6 (*)     INR 1.3 (*)    BLOOD GAS ARTERIAL FULL PANEL - Abnormal    POCT pH, Arterial 7.47 (*)     POCT pCO2, Arterial 27 (*)     POCT pO2, Arterial 74 (*)     POCT SO2, Arterial 96      POCT Oxy Hemoglobin, Arterial 94.3      POCT Hematocrit Calculated, Arterial 43.0      POCT Sodium, Arterial 124 (*)     POCT " Potassium, Arterial 4.4      POCT Chloride, Arterial 99      POCT Ionized Calcium, Arterial 1.12      POCT Glucose, Arterial 105 (*)     POCT Lactate, Arterial 0.7      POCT Base Excess, Arterial -2.5 (*)     POCT HCO3 Calculated, Arterial 19.7 (*)     POCT Hemoglobin, Arterial 14.3      POCT Anion Gap, Arterial 10      Patient Temperature        FiO2 50      Apparatus AEROSOL MASK      Flow 8.0      Site of Arterial Puncture LBA      Francisco's Test N/A     B-TYPE NATRIURETIC PEPTIDE - Normal    BNP 11      Narrative:        <100 pg/mL - Heart failure unlikely  100-299 pg/mL - Intermediate probability of acute heart                  failure exacerbation. Correlate with clinical                  context and patient history.    >=300 pg/mL - Heart Failure likely. Correlate with clinical                  context and patient history.    BNP testing is performed using different testing methodology at [facility] than at other [facility]. Direct result comparisons should only be made within the same method.      SERIAL TROPONIN-INITIAL - Normal    Troponin I, High Sensitivity 6      Narrative:     Less than 99th percentile of normal range cutoff-  Female and children under 18 years old <14 ng/L; Male <21 ng/L: Negative  Repeat testing should be performed if clinically indicated.     Female and children under 18 years old 14-50 ng/L; Male 21-50 ng/L:  Consistent with possible cardiac damage and possible increased clinical   risk. Serial measurements may help to assess extent of myocardial damage.     >50 ng/L: Consistent with cardiac damage, increased clinical risk and  myocardial infarction. Serial measurements may help assess extent of   myocardial damage.      NOTE: Children less than 1 year old may have higher baseline troponin   levels and results should be interpreted in conjunction with the overall   clinical context.     NOTE: Troponin I testing is performed using a different   testing methodology  at [facility] than at [facility]. Direct result comparisons should only   be made within the same method.   SERIAL TROPONIN, 1 HOUR - Normal    Troponin I, High Sensitivity 5      Narrative:     Less than 99th percentile of normal range cutoff-  Female and children under 18 years old <14 ng/L; Male <21 ng/L: Negative  Repeat testing should be performed if clinically indicated.     Female and children under 18 years old 14-50 ng/L; Male 21-50 ng/L:  Consistent with possible cardiac damage and possible increased clinical   risk. Serial measurements may help to assess extent of myocardial damage.     >50 ng/L: Consistent with cardiac damage, increased clinical risk and  myocardial infarction. Serial measurements may help assess extent of   myocardial damage.      NOTE: Children less than 1 year old may have higher baseline troponin   levels and results should be interpreted in conjunction with the overall   clinical context.     NOTE: Troponin I testing is performed using a different   testing methodology at [facility] than at [facility]. Direct result comparisons should only   be made within the same method.   LEGIONELLA ANTIGEN, URINE   MRSA SURVEILLANCE FOR VANCOMYCIN DE-ESCALATION, PCR   BLOOD CULTURE   BLOOD CULTURE   STREPTOCOCCUS PNEUMONIAE ANTIGEN, URINE   RESPIRATORY CULTURE/SMEAR   TROPONIN SERIES- (INITIAL, 1 HR)    Narrative:     The following orders were created for panel order Troponin I Series, High Sensitivity (0, 1 HR).  Procedure                               Abnormality         Status                     ---------                               -----------         ------                     Troponin I, High Sensiti...[[id number]]  Normal              Final result               Troponin, High Sensitivi...[[id number]]  Normal              Final result                 Please view results for these tests on the individual orders.   SARS-COV-2,  INFLUENZA A/B AND RSV PCR   PROCALCITONIN   RESPIRATORY VIRAL PANEL       XR chest 1 view   Final Result   Bilateral parenchymal infiltration. Treatment and follow-up is   suggested.        MACRO:   none        Signed by: Janet Morales 7/15/2025 3:54 PM   Dictation workstation:   [id number]      CT angio chest for pulmonary embolism    (Results Pending)       ED Course:  Diagnoses as of 07/15/25 1952   Pneumonia of both lungs due to infectious organism, unspecified part of lung   Shortness of breath   Hyponatremia     Disposition     At this time patient has been accepted to general medicine under Dr. Lion due to concern for multifocal pneumonia, shortness of breath, hyponatremia.  Discussed with attending recommended order for CT angio PE in which admission team will follow.    Procedures   Procedures    This was a shared visit with an ED attending.  The patient was seen and discussed with the ED attending    Cadence Odonnell PA-C  Emergency Medicine      Cadence Odonnell PA-C  07/15/25 1952

## 2025-07-15 NOTE — ASSESSMENT & PLAN NOTE
Recurrent pneumonia vs pneumonia with treatment failure  MDR risk factors   Acute hypoxic respiratory failure  Hyponatremia   Sidney disease

## 2025-07-15 NOTE — H&P
History Of Present Illness  69 YOM PMH of charlie disease, recently admitted at [facility] for CAP and discharged on 07/10 on cefdinir, presents to [facility] ED C/O of shortness breath.    Symptoms worsened over last 2 days with productive cough, malaise, orthopnea described as if he lies flat he feels like he is going to suffocate along with R sided pleuritic chest pain and subjective fevers. During previous infection he did not have any orthopnea. Denies rigors, HA, blurry vision/diplopia, N/V, palpitations, hemoptysis, abdominal pain, rash, dysuria or hematuria.    Vitals notale for tachypnea and hypoxia requiring 3L NC. CBC: leukocytosis 16.7 CMP: hyponatremia 126 (baseline 135). Troponin x2 and BNP notably normal. CXR (personally reviewed) worsening bibasilar infiltrates. Given rocephin/azithro and admitted for worsening pneumonia   Past Medical History  He has a past medical history of Encounter for issue of repeat prescription (12/03/2020), Hypertension, Personal history of other endocrine, nutritional and metabolic disease (11/30/2020), and Prediabetes.    Surgical History  He has a past surgical history that includes Other surgical history (08/15/2022).     Social History  He reports that he quit smoking about 50 years ago. His smoking use included cigarettes. He has never used smokeless tobacco. He reports current alcohol use. He reports that he does not use drugs.    Family History  Family History[1]     Allergies  Penicillins, Povidone-iodine, and Streptomycin    Review of Systems    12 pt ROS obtained: positives & pertinent negatives listed in HPI   Physical Exam   Constitutional: A&Ox4, NAD, diaphoretic, toxic appearing   Head and Face: Atraumatic, normocephalic   Eyes: Normal external exam, EOMI  ENT: Normal external inspection of ears and nose. Oropharynx normal.  Cardiovascular: RRR, S1/S2, no murmurs, rubs, or gallops, radial pulses +2  Pulmonary: decreased breath sounds R>L, no respiratory distress, no  "wheezing, rales or rhonchi, on 3L NC  Abdomen: +BS, soft, non-tender, nondistended, no guarding rigidity or rebound tenderness, no masses noted  MSK: Negative for edema, No joint swelling, normal movements of all extremities.   Neuro: No focal deficits, normal motor function, normal sensation, follows all commands  Skin- No lesions, contusions, or erythema.  Psychiatric: Judgment intact. Appropriate mood, affect and behavior   Last Recorded Vitals  /61   Pulse 62   Temp 36.3 °C (97.3 °F) (Temporal)   Resp 20   Wt 94.3 kg (208 lb)   SpO2 (!) 93%     Relevant Results              Assessment/Plan   Assessment & Plan  Pneumonia of both lungs due to infectious organism, unspecified part of lung  Recurrent pneumonia vs pneumonia with treatment failure  MDR risk factors   Acute hypoxic respiratory failure  Hyponatremia   Haakon disease     69 YOM recently admitted at [facility] for CAP and discharged on 07/10 on cefdinir, presents to [facility] ED C/O of shortness breath. Symptoms worsened over last 2 days with productive cough, malaise, orthopnea described as if he lies flat he feels like he is going to suffocate along with R sided pleuritic chest pain and subjective fevers.  Vitals notale for tachypnea and hypoxia requiring 3L NC. CBC: leukocytosis 16.7 CMP: hyponatremia 126 (baseline 135). Troponin x2 and BNP notably normal. CXR (personally reviewed) worsening bibasilar infiltrates.     - CTA Chest to rule out PE due to pleuritic chest pain and new hypoxia   - Blood cultures drawn  - Start Vanc/cefepime/flagyl due to recent treatment with rocephin/cefdinir and azithro  - Legionella, strep pneumo urine AG, MRSA swab, sputum culture, procalcitonin pending  - Maintain SpO2 > 92%  - Pulmonary toillet  - Duonebs q6 and albulterol prn   - Budesonide nebulizer  - Check SARS-CoV-2 & RSV PCR  - Check extend respiratory viral panel  - Due to the new orthopnea and sensation of \"feeling like suffocating\" will obtain echocardiogram "   - On 5 mg prednisone daily for charlie disease, needs increased dose due to acute illness  - Start solu-cortef 25 mg q8 for acute illness     IVF: None  DVT Ppx: Lovenox  Diet: regular  Code Status: FULL CODE     Complexity high, anticipate > 2 [place] stays, admitted for recurrent pneumonia with MDR risk factors vs CAP with antibiotic failure     Diaz Lion DO      Addendum: upon results review CTA PE: Segmental emboli B/L lower lobes, right middle lobe. Minimal embolus left lower lobe lobar artery. No saddle embolus. Overall small-to-moderate embolic disease burden. CT findings may indicate early right heart strain. High intensity heparin gtt immediately ordered and started. Spoke with his RN, repeat vitals normal, patient is HDS. Repeat troponin and BNP are normal.  He is satting appropriately on 3L NC. PESI score of 100, but Due to being HDS, normal troponin and normal BNP, activation of PERT team not indicated. His troponins were actually higher on his previous admission for CAP, they were elevated in the 40s at that time, troponin 6 ->5->7 on this admission.  Echo and duplex US ordered. Vascular medicine consulted.          [1]   Family History  Problem Relation Name Age of Onset    Ovarian cancer Mother      Peripheral vascular disease Mother      COPD Mother      Lung cancer Mother      Heart attack Mother      COPD Father      Heart failure Father      Heart attack Father      Heart attack Brother

## 2025-07-15 NOTE — ED TRIAGE NOTES
Patient presents with shortness of breath and pain in left side. Patient was recently diagnosed with pneumonia.

## 2025-07-16 ENCOUNTER — APPOINTMENT (OUTPATIENT)
Dept: CARDIOLOGY | Facility: HOSPITAL | Age: 70
DRG: 175 | End: 2025-07-16
Payer: MEDICARE

## 2025-07-16 PROBLEM — E87.1 ACUTE HYPONATREMIA: Status: ACTIVE | Noted: 2025-07-16

## 2025-07-16 PROBLEM — I26.99 BILATERAL PULMONARY EMBOLISM (MULTI): Status: ACTIVE | Noted: 2025-07-16

## 2025-07-16 LAB
ANION GAP SERPL CALC-SCNC: 10 MMOL/L (ref 10–20)
ANION GAP SERPL CALC-SCNC: 14 MMOL/L (ref 10–20)
BNP SERPL-MCNC: 12 PG/ML (ref 0–99)
BUN SERPL-MCNC: 18 MG/DL (ref 6–23)
BUN SERPL-MCNC: 21 MG/DL (ref 6–23)
CALCIUM SERPL-MCNC: 8 MG/DL (ref 8.6–10.3)
CALCIUM SERPL-MCNC: 8.5 MG/DL (ref 8.6–10.3)
CARDIAC TROPONIN I PNL SERPL HS: 7 NG/L (ref 0–20)
CARDIAC TROPONIN I PNL SERPL HS: 8 NG/L (ref 0–20)
CHLORIDE SERPL-SCNC: 94 MMOL/L (ref 98–107)
CHLORIDE SERPL-SCNC: 95 MMOL/L (ref 98–107)
CHLORIDE UR-SCNC: 72 MMOL/L
CHLORIDE/CREATININE (MMOL/G) IN URINE: 44 MMOL/G CREAT (ref 23–275)
CO2 SERPL-SCNC: 21 MMOL/L (ref 21–32)
CO2 SERPL-SCNC: 23 MMOL/L (ref 21–32)
CREAT SERPL-MCNC: 1.1 MG/DL (ref 0.5–1.3)
CREAT SERPL-MCNC: 1.18 MG/DL (ref 0.5–1.3)
CREAT UR-MCNC: 162.1 MG/DL (ref 20–370)
EGFRCR SERPLBLD CKD-EPI 2021: 66 ML/MIN/1.73M*2
EGFRCR SERPLBLD CKD-EPI 2021: 72 ML/MIN/1.73M*2
ERYTHROCYTE [DISTWIDTH] IN BLOOD BY AUTOMATED COUNT: 12.3 % (ref 11.5–14.5)
GLUCOSE BLD MANUAL STRIP-MCNC: 149 MG/DL (ref 74–99)
GLUCOSE BLD MANUAL STRIP-MCNC: 180 MG/DL (ref 74–99)
GLUCOSE SERPL-MCNC: 122 MG/DL (ref 74–99)
GLUCOSE SERPL-MCNC: 174 MG/DL (ref 74–99)
HCT VFR BLD AUTO: 39.7 % (ref 41–52)
HGB BLD-MCNC: 13.6 G/DL (ref 13.5–17.5)
HOLD SPECIMEN: NORMAL
LEGIONELLA AG UR QL: NEGATIVE
MAGNESIUM SERPL-MCNC: 1.89 MG/DL (ref 1.6–2.4)
MCH RBC QN AUTO: 31.2 PG (ref 26–34)
MCHC RBC AUTO-ENTMCNC: 34.3 G/DL (ref 32–36)
MCV RBC AUTO: 91 FL (ref 80–100)
NRBC BLD-RTO: 0 /100 WBCS (ref 0–0)
PLATELET # BLD AUTO: 487 X10*3/UL (ref 150–450)
POTASSIUM SERPL-SCNC: 4.2 MMOL/L (ref 3.5–5.3)
POTASSIUM SERPL-SCNC: 4.8 MMOL/L (ref 3.5–5.3)
POTASSIUM UR-SCNC: 72 MMOL/L
POTASSIUM/CREAT UR-RTO: 44 MMOL/G CREAT
PROCALCITONIN SERPL-MCNC: 0.09 NG/ML
RBC # BLD AUTO: 4.36 X10*6/UL (ref 4.5–5.9)
S PNEUM AG UR QL: NEGATIVE
SODIUM SERPL-SCNC: 124 MMOL/L (ref 136–145)
SODIUM SERPL-SCNC: 124 MMOL/L (ref 136–145)
SODIUM UR-SCNC: 55 MMOL/L
SODIUM/CREAT UR-RTO: 34 MMOL/G CREAT
UFH PPP CHRO-ACNC: 0.2 IU/ML (ref ?–1.1)
UFH PPP CHRO-ACNC: 0.5 IU/ML (ref ?–1.1)
UFH PPP CHRO-ACNC: 0.8 IU/ML (ref ?–1.1)
UFH PPP CHRO-ACNC: 1.3 IU/ML (ref ?–1.1)
WBC # BLD AUTO: 19.3 X10*3/UL (ref 4.4–11.3)

## 2025-07-16 PROCEDURE — 84133 ASSAY OF URINE POTASSIUM: CPT | Performed by: STUDENT IN AN ORGANIZED HEALTH CARE EDUCATION/TRAINING PROGRAM

## 2025-07-16 PROCEDURE — 93306 TTE W/DOPPLER COMPLETE: CPT | Performed by: INTERNAL MEDICINE

## 2025-07-16 PROCEDURE — 2500000005 HC RX 250 GENERAL PHARMACY W/O HCPCS

## 2025-07-16 PROCEDURE — 2500000002 HC RX 250 W HCPCS SELF ADMINISTERED DRUGS (ALT 637 FOR MEDICARE OP, ALT 636 FOR OP/ED): Performed by: STUDENT IN AN ORGANIZED HEALTH CARE EDUCATION/TRAINING PROGRAM

## 2025-07-16 PROCEDURE — 2500000004 HC RX 250 GENERAL PHARMACY W/ HCPCS (ALT 636 FOR OP/ED): Mod: JW

## 2025-07-16 PROCEDURE — 2500000005 HC RX 250 GENERAL PHARMACY W/O HCPCS: Performed by: STUDENT IN AN ORGANIZED HEALTH CARE EDUCATION/TRAINING PROGRAM

## 2025-07-16 PROCEDURE — 82947 ASSAY GLUCOSE BLOOD QUANT: CPT

## 2025-07-16 PROCEDURE — 93970 EXTREMITY STUDY: CPT

## 2025-07-16 PROCEDURE — 83735 ASSAY OF MAGNESIUM: CPT

## 2025-07-16 PROCEDURE — 36415 COLL VENOUS BLD VENIPUNCTURE: CPT

## 2025-07-16 PROCEDURE — C8929 TTE W OR WO FOL WCON,DOPPLER: HCPCS

## 2025-07-16 PROCEDURE — 85520 HEPARIN ASSAY: CPT

## 2025-07-16 PROCEDURE — 1200000002 HC GENERAL ROOM WITH TELEMETRY DAILY

## 2025-07-16 PROCEDURE — 85027 COMPLETE CBC AUTOMATED: CPT

## 2025-07-16 PROCEDURE — 2500000001 HC RX 250 WO HCPCS SELF ADMINISTERED DRUGS (ALT 637 FOR MEDICARE OP): Performed by: STUDENT IN AN ORGANIZED HEALTH CARE EDUCATION/TRAINING PROGRAM

## 2025-07-16 PROCEDURE — 83935 ASSAY OF URINE OSMOLALITY: CPT | Mod: STJLAB | Performed by: STUDENT IN AN ORGANIZED HEALTH CARE EDUCATION/TRAINING PROGRAM

## 2025-07-16 PROCEDURE — 2500000004 HC RX 250 GENERAL PHARMACY W/ HCPCS (ALT 636 FOR OP/ED): Performed by: STUDENT IN AN ORGANIZED HEALTH CARE EDUCATION/TRAINING PROGRAM

## 2025-07-16 PROCEDURE — 99233 SBSQ HOSP IP/OBS HIGH 50: CPT | Performed by: STUDENT IN AN ORGANIZED HEALTH CARE EDUCATION/TRAINING PROGRAM

## 2025-07-16 PROCEDURE — 2500000002 HC RX 250 W HCPCS SELF ADMINISTERED DRUGS (ALT 637 FOR MEDICARE OP, ALT 636 FOR OP/ED)

## 2025-07-16 PROCEDURE — 84484 ASSAY OF TROPONIN QUANT: CPT

## 2025-07-16 PROCEDURE — 83880 ASSAY OF NATRIURETIC PEPTIDE: CPT

## 2025-07-16 PROCEDURE — 80048 BASIC METABOLIC PNL TOTAL CA: CPT

## 2025-07-16 PROCEDURE — 93970 EXTREMITY STUDY: CPT | Performed by: SURGERY

## 2025-07-16 PROCEDURE — 94640 AIRWAY INHALATION TREATMENT: CPT

## 2025-07-16 PROCEDURE — 2500000004 HC RX 250 GENERAL PHARMACY W/ HCPCS (ALT 636 FOR OP/ED)

## 2025-07-16 PROCEDURE — 80048 BASIC METABOLIC PNL TOTAL CA: CPT | Performed by: STUDENT IN AN ORGANIZED HEALTH CARE EDUCATION/TRAINING PROGRAM

## 2025-07-16 PROCEDURE — 2500000001 HC RX 250 WO HCPCS SELF ADMINISTERED DRUGS (ALT 637 FOR MEDICARE OP)

## 2025-07-16 RX ORDER — LEVOTHYROXINE SODIUM 50 UG/1
50 TABLET ORAL DAILY
Status: DISCONTINUED | OUTPATIENT
Start: 2025-07-16 | End: 2025-07-19 | Stop reason: HOSPADM

## 2025-07-16 RX ORDER — PREDNISONE 5 MG/1
5 TABLET ORAL DAILY
Status: DISCONTINUED | OUTPATIENT
Start: 2025-07-16 | End: 2025-07-19 | Stop reason: HOSPADM

## 2025-07-16 RX ORDER — FLUTICASONE PROPIONATE 50 MCG
1 SPRAY, SUSPENSION (ML) NASAL DAILY
Status: DISCONTINUED | OUTPATIENT
Start: 2025-07-16 | End: 2025-07-19 | Stop reason: HOSPADM

## 2025-07-16 RX ORDER — HEPARIN SODIUM 10000 [USP'U]/100ML
0-4500 INJECTION, SOLUTION INTRAVENOUS CONTINUOUS
Status: DISPENSED | OUTPATIENT
Start: 2025-07-16 | End: 2025-07-16

## 2025-07-16 RX ORDER — IPRATROPIUM BROMIDE AND ALBUTEROL SULFATE 2.5; .5 MG/3ML; MG/3ML
3 SOLUTION RESPIRATORY (INHALATION) 3 TIMES DAILY
Status: DISCONTINUED | OUTPATIENT
Start: 2025-07-16 | End: 2025-07-19 | Stop reason: HOSPADM

## 2025-07-16 RX ORDER — ATORVASTATIN CALCIUM 40 MG/1
40 TABLET, FILM COATED ORAL DAILY
Status: DISCONTINUED | OUTPATIENT
Start: 2025-07-16 | End: 2025-07-19 | Stop reason: HOSPADM

## 2025-07-16 RX ORDER — ALBUTEROL SULFATE 0.83 MG/ML
3 SOLUTION RESPIRATORY (INHALATION) EVERY 2 HOUR PRN
Status: DISCONTINUED | OUTPATIENT
Start: 2025-07-16 | End: 2025-07-19 | Stop reason: HOSPADM

## 2025-07-16 RX ORDER — SODIUM CHLORIDE 1000 MG
1000 TABLET, SOLUBLE MISCELLANEOUS
Status: DISCONTINUED | OUTPATIENT
Start: 2025-07-16 | End: 2025-07-19 | Stop reason: HOSPADM

## 2025-07-16 RX ORDER — ALBUTEROL SULFATE 0.83 MG/ML
3 SOLUTION RESPIRATORY (INHALATION) EVERY 4 HOURS PRN
Status: DISCONTINUED | OUTPATIENT
Start: 2025-07-16 | End: 2025-07-16

## 2025-07-16 RX ORDER — INSULIN LISPRO 100 [IU]/ML
0-5 INJECTION, SOLUTION INTRAVENOUS; SUBCUTANEOUS
Status: DISCONTINUED | OUTPATIENT
Start: 2025-07-16 | End: 2025-07-19 | Stop reason: HOSPADM

## 2025-07-16 RX ADMIN — HYDROCORTISONE 25 MG: 5 TABLET ORAL at 17:05

## 2025-07-16 RX ADMIN — OXYCODONE HYDROCHLORIDE 5 MG: 5 TABLET ORAL at 05:19

## 2025-07-16 RX ADMIN — ATORVASTATIN CALCIUM 40 MG: 40 TABLET, FILM COATED ORAL at 10:33

## 2025-07-16 RX ADMIN — HEPARIN SODIUM 1700 UNITS/HR: 10000 INJECTION, SOLUTION INTRAVENOUS at 15:14

## 2025-07-16 RX ADMIN — HYDROCORTISONE 25 MG: 5 TABLET ORAL at 10:33

## 2025-07-16 RX ADMIN — Medication 2 L/MIN: at 10:52

## 2025-07-16 RX ADMIN — IPRATROPIUM BROMIDE AND ALBUTEROL SULFATE 3 ML: 2.5; .5 SOLUTION RESPIRATORY (INHALATION) at 21:53

## 2025-07-16 RX ADMIN — METRONIDAZOLE 500 MG: 500 INJECTION, SOLUTION INTRAVENOUS at 05:11

## 2025-07-16 RX ADMIN — SODIUM CHLORIDE 1 G: 1 TABLET ORAL at 17:05

## 2025-07-16 RX ADMIN — FLUTICASONE PROPIONATE 1 SPRAY: 50 SPRAY, METERED NASAL at 10:33

## 2025-07-16 RX ADMIN — LEVOTHYROXINE SODIUM 50 MCG: 0.05 TABLET ORAL at 10:33

## 2025-07-16 RX ADMIN — BUDESONIDE 0.25 MG: 0.25 INHALANT RESPIRATORY (INHALATION) at 21:54

## 2025-07-16 RX ADMIN — OXYCODONE HYDROCHLORIDE 5 MG: 5 TABLET ORAL at 10:50

## 2025-07-16 RX ADMIN — IPRATROPIUM BROMIDE AND ALBUTEROL SULFATE 3 ML: 2.5; .5 SOLUTION RESPIRATORY (INHALATION) at 11:19

## 2025-07-16 RX ADMIN — HEPARIN SODIUM 1700 UNITS/HR: 10000 INJECTION, SOLUTION INTRAVENOUS at 10:43

## 2025-07-16 RX ADMIN — HEPARIN SODIUM 1700 UNITS/HR: 10000 INJECTION, SOLUTION INTRAVENOUS at 01:43

## 2025-07-16 RX ADMIN — IPRATROPIUM BROMIDE AND ALBUTEROL SULFATE 3 ML: 2.5; .5 SOLUTION RESPIRATORY (INHALATION) at 08:11

## 2025-07-16 RX ADMIN — HYDROCORTISONE 25 MG: 5 TABLET ORAL at 23:13

## 2025-07-16 RX ADMIN — APIXABAN 10 MG: 5 TABLET, FILM COATED ORAL at 20:59

## 2025-07-16 RX ADMIN — HYDROCORTISONE 25 MG: 5 TABLET ORAL at 00:07

## 2025-07-16 RX ADMIN — Medication 2 L/MIN: at 21:56

## 2025-07-16 RX ADMIN — CEFEPIME HYDROCHLORIDE 2 G: 2 INJECTION, SOLUTION INTRAVENOUS at 06:32

## 2025-07-16 RX ADMIN — SODIUM CHLORIDE 1000 ML: 0.9 INJECTION, SOLUTION INTRAVENOUS at 10:33

## 2025-07-16 RX ADMIN — PERFLUTREN 3 ML OF DILUTION: 6.52 INJECTION, SUSPENSION INTRAVENOUS at 09:55

## 2025-07-16 ASSESSMENT — PAIN DESCRIPTION - DESCRIPTORS: DESCRIPTORS: ACHING;DISCOMFORT

## 2025-07-16 ASSESSMENT — PAIN SCALES - GENERAL
PAINLEVEL_OUTOF10: 5 - MODERATE PAIN
PAINLEVEL_OUTOF10: 7
PAINLEVEL_OUTOF10: 0 - NO PAIN
PAINLEVEL_OUTOF10: 7

## 2025-07-16 ASSESSMENT — COGNITIVE AND FUNCTIONAL STATUS - GENERAL
DRESSING REGULAR LOWER BODY CLOTHING: A LITTLE
TOILETING: A LITTLE
HELP NEEDED FOR BATHING: A LITTLE
MOBILITY SCORE: 18
PERSONAL GROOMING: A LITTLE
MOVING TO AND FROM BED TO CHAIR: A LITTLE
WALKING IN HOSPITAL ROOM: A LITTLE
PERSONAL GROOMING: A LITTLE
STANDING UP FROM CHAIR USING ARMS: A LITTLE
CLIMB 3 TO 5 STEPS WITH RAILING: A LITTLE
TOILETING: A LITTLE
DRESSING REGULAR LOWER BODY CLOTHING: A LITTLE
CLIMB 3 TO 5 STEPS WITH RAILING: A LITTLE
MOBILITY SCORE: 18
STANDING UP FROM CHAIR USING ARMS: A LITTLE
DAILY ACTIVITIY SCORE: 19
WALKING IN HOSPITAL ROOM: A LITTLE
DRESSING REGULAR UPPER BODY CLOTHING: A LITTLE
TURNING FROM BACK TO SIDE WHILE IN FLAT BAD: A LITTLE
DRESSING REGULAR UPPER BODY CLOTHING: A LITTLE
MOVING TO AND FROM BED TO CHAIR: A LITTLE
MOVING FROM LYING ON BACK TO SITTING ON SIDE OF FLAT BED WITH BEDRAILS: A LITTLE
HELP NEEDED FOR BATHING: A LITTLE
MOVING FROM LYING ON BACK TO SITTING ON SIDE OF FLAT BED WITH BEDRAILS: A LITTLE
TURNING FROM BACK TO SIDE WHILE IN FLAT BAD: A LITTLE
DAILY ACTIVITIY SCORE: 19

## 2025-07-16 ASSESSMENT — PAIN DESCRIPTION - ORIENTATION
ORIENTATION: LEFT
ORIENTATION: LEFT

## 2025-07-16 ASSESSMENT — PAIN - FUNCTIONAL ASSESSMENT
PAIN_FUNCTIONAL_ASSESSMENT: 0-10

## 2025-07-16 ASSESSMENT — PAIN DESCRIPTION - LOCATION
LOCATION: CHEST
LOCATION: CHEST

## 2025-07-16 NOTE — CARE PLAN
Problem: Pain - Adult  Goal: Verbalizes/displays adequate comfort level or baseline comfort level  Outcome: Progressing     The patient's goals for the shift include pain control    The clinical goals for the shift include see POC

## 2025-07-16 NOTE — PROGRESS NOTES
Patient is alert and oriented X4.  Denies any nausea at this time.  Is complaining of SOB and pain located at his left clavicle and radiating to his abdomen; Roxicodone given per order.  Stat CT Angio Chest done and shows multiple PE; patient was started on a heparin drip at a rate of 17 units/hour.  Next heparin assay due at 0545; waiting for results to determine the next dosage to be given and/or changed.  Resting comfortably in bed.  Call light is within reach and bed alarm is in place.

## 2025-07-16 NOTE — PROGRESS NOTES
07/16/25 1512   Discharge Planning   Living Arrangements Alone   Support Systems Family members   Assistance Needed none   Type of Residence Private residence   Home or Post Acute Services None   Expected Discharge Disposition Home   Intensity of Service   Intensity of Service 0-30 min     Patient lives at home alone in [place]. He is independent at baseline, no use of DME. PCP is Nikunj Ybarra. DC plan TBD pending therapy evals. Care transitions team to follow for d/c planning needs.

## 2025-07-16 NOTE — PROGRESS NOTES
Physical Therapy                 Therapy Communication Note    Patient Name: Adrian Rosa  MRN: [id number]  Department: [facility] 3 N  Room: [facility]/3021-A  Today's Date: 7/16/2025     Discipline: Physical Therapy    Missed Visit: PT Missed Visit: Yes     Missed Visit Reason: Missed Visit Reason: Other (Comment) (Orders received and chart review completed. Pt awaiting two therapeutic assays at this time. Will continue to follow up with pt as appropriate.)    Missed Time: Attempt    Comment:

## 2025-07-16 NOTE — PROGRESS NOTES
Occupational Therapy                 Therapy Communication Note    Patient Name: Adrian Rosa  MRN: [id number]  Department: [facility] 3 N  Room: 302/3021-A  Today's Date: 7/16/2025     Discipline: Occupational Therapy    Missed Visit:   Yes    Missed Visit Reason:  OT orders received and chart reviewed. Awaiting therapeutic heparin assay. Will hold and attempt again as medically appropriate.     Missed Time: Attempt    Comment:

## 2025-07-16 NOTE — PROGRESS NOTES
"Nutrition Initial Assessment:   Nutrition Assessment    Reason for Assessment: Admission nursing screening (MST score 2 for wt loss and decreased appetite.)    Patient is a 69 y.o. male presenting from home for pneumonia of both lungs due to infectious organism, part of lung. Cardiology on consult. Per chart notes, pt w/CAP and hospitalized with discharge 7/10. CTA chest ordered to rule out PE.     Past Medical History   has a past medical history of Encounter for issue of repeat prescription (12/03/2020), Hypertension, Personal history of other endocrine, nutritional and metabolic disease (11/30/2020), and Prediabetes.  Surgical History   has a past surgical history that includes Other surgical history (08/15/2022).        Nutrition History:  Energy Intake:  (reported decreased appetite on MST screening tool upon admission)  Food and Nutrient History: unable to obtain from pt at this time  Food Allergy:  (NKFA)  Food Intolerance:  (no known intolerances)       Anthropometrics:  Height: 170.2 cm (5' 7\")   Weight: 92.4 kg (203 lb 11.3 oz)   BMI (Calculated): 31.9      Weight History:   Wt Readings from Last 22 Encounters:   07/15/25 92.4 kg (203 lb 11.3 oz)   07/14/25 94.3 kg (208 lb)   07/08/25 96.6 kg (212 lb 15.4 oz)   07/02/25 99.8 kg (220 lb)   06/19/25 99.8 kg (220 lb)   02/19/25 98 kg (216 lb)   01/13/25 98.9 kg (218 lb)   12/11/24 97.5 kg (215 lb)   11/20/24 96.6 kg (213 lb)   05/29/24 99.8 kg (220 lb)   05/09/24 98.9 kg (218 lb)   03/20/24 98.9 kg (218 lb)   12/27/23 95.3 kg (210 lb)   10/04/23 98.4 kg (217 lb)   04/04/23 98.9 kg (218 lb)   02/03/23 99.8 kg (220 lb)   12/21/22 99.8 kg (220 lb)   12/15/22 99.3 kg (219 lb)   08/16/22 102 kg (224 lb 2 oz)   08/01/22 102 kg (224 lb 13.9 oz)   07/21/22 102 kg (225 lb)   07/19/22 96.6 kg (213 lb)       Weight Change %:  Weight History / % Weight Change: per archives 12/24 215#, 9/23 96kg. wt 7/2/25 220# 7.7% wt loss x 1 month  Significant Weight Loss: " Yes  Interpretation of Weight Loss: >5% in 1 month    Nutrition Focused Physical Exam Findings:    Subcutaneous Fat Loss:   Defer Subcutaneous Fat Loss Assessment: Defer all  Defer All Reason: +covid r/o  Muscle Wasting:  Defer Muscle Wasting Assessment: Defer all  Defer All Reason: +covid r/o  Edema:  Edema: none  Physical Findings:  Skin: Negative (warm, dry, bruising)    Nutrition Significant Labs:  CBC Trend:   Results from last 7 days   Lab Units 07/16/25  0624 07/15/25  1541 07/10/25  0639   WBC AUTO x10*3/uL 19.3* 16.7* 16.3*   RBC AUTO x10*6/uL 4.36* 4.72 3.63*   HEMOGLOBIN g/dL 13.6 14.7 11.4*   HEMATOCRIT % 39.7* 42.7 32.9*   MCV fL 91 91 91   PLATELETS AUTO x10*3/uL 487* 508* 349    , BMP Trend:   Results from last 7 days   Lab Units 07/16/25  0624 07/15/25  1541 07/10/25  0639   GLUCOSE mg/dL 122* 88 94   CALCIUM mg/dL 8.5* 9.0 8.1*   SODIUM mmol/L 124* 126* 134*   POTASSIUM mmol/L 4.8 4.8 4.0   CO2 mmol/L 21 23 19*   CHLORIDE mmol/L 94* 95* 104   BUN mg/dL 18 23 26*   CREATININE mg/dL 1.18 1.27 1.13    , A1C:  Lab Results   Component Value Date    HGBA1C 5.8 05/09/2024   , BG POCT trend:   Results from last 7 days   Lab Units 07/10/25  1117 07/10/25  0713 07/09/25 2039 07/09/25  1641   POCT GLUCOSE mg/dL 131* 104* 150* 186*    , Liver Function Trend:   Results from last 7 days   Lab Units 07/15/25  1541   ALK PHOS U/L 62   AST U/L 32   ALT U/L 37   BILIRUBIN TOTAL mg/dL 0.6    , Renal Lab Trend:   Results from last 7 days   Lab Units 07/16/25  0624 07/15/25  1541 07/10/25  0639   POTASSIUM mmol/L 4.8 4.8 4.0   SODIUM mmol/L 124* 126* 134*   MAGNESIUM mg/dL 1.89  --  2.00   EGFR mL/min/1.73m*2 66 61 70   BUN mg/dL 18 23 26*   CREATININE mg/dL 1.18 1.27 1.13    , TPN/PPN Labs:   Results from last 7 days   Lab Units 07/16/25  0624 07/15/25  1541 07/10/25  0639   GLUCOSE mg/dL 122* 88 94   POTASSIUM mmol/L 4.8 4.8 4.0   MAGNESIUM mg/dL 1.89  --  2.00   SODIUM mmol/L 124* 126* 134*   CHLORIDE mmol/L 94* 95*  "104   ALT U/L  --  37  --    AST U/L  --  32  --    ALK PHOS U/L  --  62  --    BILIRUBIN TOTAL mg/dL  --  0.6  --     , Lipid Panel:   Lab Results   Component Value Date    CHOL 147 05/08/2025    HDL 36 (L) 05/08/2025    CHHDL 4.1 05/08/2025    LDLF 71 06/07/2023    VLDL 15 11/26/2024    TRIG 110 05/08/2025    , Vit D:   Lab Results   Component Value Date    VITD25 24 (A) 12/11/2020    , Vit B12: No results found for: \"ZDLBQILH90\" , Iron Panel: No results found for: \"IRON\", \"TIBC\", \"FERRITIN\" , Folate: No results found for: \"FOLATE\"     Nutrition Specific Medications:  Scheduled medications  Scheduled Medications[1]  Continuous medications  Continuous Medications[2]  PRN medications  PRN Medications[3]  0-10 (Numeric) Pain Score: 5 - Moderate pain    I/O:    ;      Dietary Orders (From admission, onward)       Start     Ordered    07/16/25 1348  Oral nutritional supplements  Until discontinued        Question Answer Comment   Deliver with Breakfast    Deliver with Dinner    Select supplement: Glucerna Shake        07/16/25 1347    07/15/25 2105  May Participate in Room Service  ( ROOM SERVICE MAY PARTICIPATE)  Once        Question:  .  Answer:  Yes    07/15/25 2104    07/15/25 2047  Adult diet Regular  Diet effective now        Question:  Diet type  Answer:  Regular    07/15/25 2046                Estimated Needs:   Total Energy Estimated Needs in 24 hours (kCal):  (4828-5060 (20-23kcal/kg))     Total Protein Estimated Needs in 24 Hours (g):  (80-99 (1.2-1.5g/kg))      Method for Estimating 24 Hour Fluid Needs: 1mL/kcal  Patient on Order Fluid Restriction: No      Nutrition Diagnosis   Malnutrition Diagnosis  Patient has Malnutrition Diagnosis: No    Nutrition Diagnosis  Patient has Nutrition Diagnosis: Yes  Diagnosis Status (1): New  Nutrition Diagnosis 1: Unintended weight loss  Related to (1): decreased PO intakes and increased nutritional stress w/recent illness and hospitalizations  As Evidenced by (1): 7.7% " wt loss x <1 month.     Nutrition Interventions/Recommendations   Nutrition prescription for oral nutrition    Nutrition Recommendations:  Individualized Nutrition Prescription Provided for : Diet: continue oral diet as ordered - adult diet regular.    Nutrition Interventions/Goals:   Meals and Snacks: General healthful diet  Goal: will consume >50% of meals  Medical Food Supplement: Commercial beverage medical food supplement therapy  Goal: will provide Glucerna Shakes at breakfast and dinner meals (220kcal, 10g pro per 8oz)  Coordination of Care with Providers: Nursing (ELIZABETH YEN)    Education documentation:   N/a at this time - will attempt at follow up.     Nutrition Monitoring and Evaluation   Intake / Amount of food: Consumes at least 75% or more of meals/snacks/supplements         Electrolyte and Renal Panel: Electrolytes within normal limits  Glucose/Endocrine Profile: Glucose within normal limits ( mg/dL)    Skin Finding: Promote intact skin - Promote skin integrity    Goal Status: New goal(s) identified    Time Spent (min): 45 minutes       Follow up 3-8 days  Last RD note 7/16/25       [1] atorvastatin, 40 mg, oral, Daily  budesonide, 0.25 mg, nebulization, BID  fluticasone, 1 spray, Each Nostril, Daily  hydrocortisone, 25 mg, oral, q8h EILEEN  insulin lispro, 0-5 Units, subcutaneous, TID AC  ipratropium-albuteroL, 3 mL, nebulization, 4x daily  levothyroxine, 50 mcg, oral, Daily  oxygen, , inhalation, Continuous - Inhalation  perflutren protein A microsphere, 0.5 mL, intravenous, Once in imaging  polyethylene glycol, 17 g, oral, Daily  [Held by provider] predniSONE, 5 mg, oral, Daily  [2] heparin, 0-4,500 Units/hr, Last Rate: 1,700 Units/hr (07/16/25 1043)  [3] PRN medications: acetaminophen, albuterol, heparin, ondansetron, oxyCODONE

## 2025-07-16 NOTE — PROGRESS NOTES
Adrian Rosa is a 69 y.o. male on day 1 of admission presenting with Pneumonia of both lungs due to infectious organism, unspecified part of lung.      Subjective   History obtained by nocturnist confirmed.   Patient endorses R sided pleuritic chest pain and generalized weakness. No fever/chills. No lightheadedness/dizziness.     Objective     Last Recorded Vitals  /71   Pulse 62   Temp 36.1 °C (97 °F) (Temporal)   Resp 20   Wt 92.4 kg (203 lb 11.3 oz)   SpO2 96%   Intake/Output last 3 Shifts:    Intake/Output Summary (Last 24 hours) at 7/16/2025 1222  Last data filed at 7/16/2025 1033  Gross per 24 hour   Intake 1247.89 ml   Output --   Net 1247.89 ml       Admission Weight  Weight: 94.3 kg (208 lb) (07/15/25 1500)    Daily Weight  07/15/25 : 92.4 kg (203 lb 11.3 oz)      Physical Exam  Gen: appears ill but in no acute distress  HEENT: MMM, EOMI, no scleral icterus  CV: RRR, no murmurs appreciated  Lungs: patient was unable to sit up or roll over due to pain, so exam limited. I listened to him anteriorly and laterally and he had good air entry bl. I was unable to appreciate any wheezes or crackles.   Abd: soft, nontender, nondistended, normoactive BS  Ext: WWP, no edema  Skin: no rashes/lesions  Neuro: Cns III-XII grossly intact, alert and oriented x3  Psych: appropriate mood and affect       Relevant Results  Results for orders placed or performed during the hospital encounter of 07/15/25 (from the past 24 hours)   CBC with Differential   Result Value Ref Range    WBC 16.7 (H) 4.4 - 11.3 x10*3/uL    nRBC 0.0 0.0 - 0.0 /100 WBCs    RBC 4.72 4.50 - 5.90 x10*6/uL    Hemoglobin 14.7 13.5 - 17.5 g/dL    Hematocrit 42.7 41.0 - 52.0 %    MCV 91 80 - 100 fL    MCH 31.1 26.0 - 34.0 pg    MCHC 34.4 32.0 - 36.0 g/dL    RDW 12.4 11.5 - 14.5 %    Platelets 508 (H) 150 - 450 x10*3/uL    Neutrophils % 60.0 40.0 - 80.0 %    Immature Granulocytes %, Automated 0.8 0.0 - 0.9 %    Lymphocytes % 26.5 13.0 - 44.0 %     Monocytes % 10.8 2.0 - 10.0 %    Eosinophils % 1.7 0.0 - 6.0 %    Basophils % 0.2 0.0 - 2.0 %    Neutrophils Absolute 10.03 (H) 1.20 - 7.70 x10*3/uL    Immature Granulocytes Absolute, Automated 0.13 0.00 - 0.70 x10*3/uL    Lymphocytes Absolute 4.43 1.20 - 4.80 x10*3/uL    Monocytes Absolute 1.81 (H) 0.10 - 1.00 x10*3/uL    Eosinophils Absolute 0.29 0.00 - 0.70 x10*3/uL    Basophils Absolute 0.04 0.00 - 0.10 x10*3/uL   Comprehensive Metabolic Panel   Result Value Ref Range    Glucose 88 74 - 99 mg/dL    Sodium 126 (L) 136 - 145 mmol/L    Potassium 4.8 3.5 - 5.3 mmol/L    Chloride 95 (L) 98 - 107 mmol/L    Bicarbonate 23 21 - 32 mmol/L    Anion Gap 13 10 - 20 mmol/L    Urea Nitrogen 23 6 - 23 mg/dL    Creatinine 1.27 0.50 - 1.30 mg/dL    eGFR 61 >60 mL/min/1.73m*2    Calcium 9.0 8.6 - 10.3 mg/dL    Albumin 3.5 3.4 - 5.0 g/dL    Alkaline Phosphatase 62 33 - 136 U/L    Total Protein 8.6 (H) 6.4 - 8.2 g/dL    AST 32 9 - 39 U/L    Bilirubin, Total 0.6 0.0 - 1.2 mg/dL    ALT 37 10 - 52 U/L   Brain Natriuretic Peptide   Result Value Ref Range    BNP 11 0 - 99 pg/mL   Protime-INR   Result Value Ref Range    Protime 14.6 (H) 9.8 - 12.4 seconds    INR 1.3 (H) 0.9 - 1.1   Troponin I, High Sensitivity, Initial   Result Value Ref Range    Troponin I, High Sensitivity 6 0 - 20 ng/L   ECG 12 lead   Result Value Ref Range    Ventricular Rate 67 BPM    Atrial Rate 67 BPM    HI Interval 158 ms    QRS Duration 84 ms    QT Interval 402 ms    QTC Calculation(Bazett) 424 ms    P Axis 42 degrees    R Axis 9 degrees    T Axis 41 degrees    QRS Count 11 beats    Q Onset 213 ms    P Onset 134 ms    P Offset 191 ms    T Offset 414 ms    QTC Fredericia 417 ms   Troponin, High Sensitivity, 1 Hour   Result Value Ref Range    Troponin I, High Sensitivity 5 0 - 20 ng/L   ECG 12 lead   Result Value Ref Range    Ventricular Rate 61 BPM    Atrial Rate 61 BPM    HI Interval 166 ms    QRS Duration 82 ms    QT Interval 414 ms    QTC Calculation(Bazett)  416 ms    P Axis 36 degrees    R Axis 109 degrees    T Axis -13 degrees    QRS Count 10 beats    Q Onset 222 ms    P Onset 139 ms    P Offset 199 ms    T Offset 429 ms    QTC Fredericia 416 ms   Blood Gas Arterial Full Panel   Result Value Ref Range    POCT pH, Arterial 7.47 (H) 7.38 - 7.42 pH    POCT pCO2, Arterial 27 (L) 38 - 42 mm Hg    POCT pO2, Arterial 74 (L) 85 - 95 mm Hg    POCT SO2, Arterial 96 94 - 100 %    POCT Oxy Hemoglobin, Arterial 94.3 94.0 - 98.0 %    POCT Hematocrit Calculated, Arterial 43.0 41.0 - 52.0 %    POCT Sodium, Arterial 124 (L) 136 - 145 mmol/L    POCT Potassium, Arterial 4.4 3.5 - 5.3 mmol/L    POCT Chloride, Arterial 99 98 - 107 mmol/L    POCT Ionized Calcium, Arterial 1.12 1.10 - 1.33 mmol/L    POCT Glucose, Arterial 105 (H) 74 - 99 mg/dL    POCT Lactate, Arterial 0.7 0.4 - 2.0 mmol/L    POCT Base Excess, Arterial -2.5 (L) -2.0 - 3.0 mmol/L    POCT HCO3 Calculated, Arterial 19.7 (L) 22.0 - 26.0 mmol/L    POCT Hemoglobin, Arterial 14.3 13.5 - 17.5 g/dL    POCT Anion Gap, Arterial 10 10 - 25 mmo/L    Patient Temperature      FiO2 50 %    Apparatus AEROSOL MASK     Flow 8.0 LPM    Site of Arterial Puncture LBA     Francisco's Test N/A    Legionella Antigen, Urine    Specimen: Clean Catch/Voided; Urine   Result Value Ref Range    L. pneumophila Urine Ag Negative Negative   Streptococcus pneumoniae Antigen, Urine    Specimen: Clean Catch/Voided; Urine   Result Value Ref Range    Streptococcus pneumoniae Ag, Urine Negative Negative   MRSA Surveillance for Vancomycin De-escalation, PCR    Specimen: Anterior Nares; Swab   Result Value Ref Range    MRSA PCR Not Detected Not Detected   Sars-CoV-2, Influenza A/B and RSV PCR   Result Value Ref Range    Coronavirus 2019, PCR Not Detected Not Detected    Flu A Result Not Detected Not Detected    Flu B Result Not Detected Not Detected    RSV PCR Not Detected Not Detected   Blood Culture    Specimen: Peripheral Venipuncture; Blood culture   Result Value  Ref Range    Blood Culture Loaded on Instrument - Culture in progress    Procalcitonin   Result Value Ref Range    Procalcitonin 0.09 (H) <=0.07 ng/mL   Blood Culture    Specimen: Peripheral Venipuncture; Blood culture   Result Value Ref Range    Blood Culture Loaded on Instrument - Culture in progress    Light Blue Top   Result Value Ref Range    Extra Tube Hold for add-ons.    Lavender Top   Result Value Ref Range    Extra Tube Hold for add-ons.    PST Top   Result Value Ref Range    Extra Tube Hold for add-ons.    B-Type Natriuretic Peptide   Result Value Ref Range    BNP 12 0 - 99 pg/mL   Troponin I, High Sensitivity   Result Value Ref Range    Troponin I, High Sensitivity 7 0 - 20 ng/L   Heparin Assay, UFH   Result Value Ref Range    Heparin Unfractionated 0.2 See Comment Below for Therapeutic Ranges IU/mL   Basic metabolic panel   Result Value Ref Range    Glucose 122 (H) 74 - 99 mg/dL    Sodium 124 (L) 136 - 145 mmol/L    Potassium 4.8 3.5 - 5.3 mmol/L    Chloride 94 (L) 98 - 107 mmol/L    Bicarbonate 21 21 - 32 mmol/L    Anion Gap 14 10 - 20 mmol/L    Urea Nitrogen 18 6 - 23 mg/dL    Creatinine 1.18 0.50 - 1.30 mg/dL    eGFR 66 >60 mL/min/1.73m*2    Calcium 8.5 (L) 8.6 - 10.3 mg/dL   CBC   Result Value Ref Range    WBC 19.3 (H) 4.4 - 11.3 x10*3/uL    nRBC 0.0 0.0 - 0.0 /100 WBCs    RBC 4.36 (L) 4.50 - 5.90 x10*6/uL    Hemoglobin 13.6 13.5 - 17.5 g/dL    Hematocrit 39.7 (L) 41.0 - 52.0 %    MCV 91 80 - 100 fL    MCH 31.2 26.0 - 34.0 pg    MCHC 34.3 32.0 - 36.0 g/dL    RDW 12.3 11.5 - 14.5 %    Platelets 487 (H) 150 - 450 x10*3/uL   Magnesium   Result Value Ref Range    Magnesium 1.89 1.60 - 2.40 mg/dL   Troponin I, High Sensitivity   Result Value Ref Range    Troponin I, High Sensitivity 8 0 - 20 ng/L   Heparin Assay, UFH   Result Value Ref Range    Heparin Unfractionated 1.3 (HH) See Comment Below for Therapeutic Ranges IU/mL   SST TOP   Result Value Ref Range    Extra Tube Hold for add-ons.    Heparin  Assay, UFH   Result Value Ref Range    Heparin Unfractionated 0.8 See Comment Below for Therapeutic Ranges IU/mL   Transthoracic Echo Complete   Result Value Ref Range    BSA 2.09 m2       Vascular US lower extremity venous duplex bilateral  Result Date: 7/16/2025  Preliminary Cardiology Report            [facility] [place] [address] [place] [address]     Tel [phone] Fax [phone]          Preliminary Vascular Lab Report  VASC US LOWER EXTREMITY VENOUS DUPLEX BILATERAL  Patient Name:     LYNDSEY BERMUDEZ Reading Physician:  [id number] Ezekiel Astudillo MD Study Date:       7/16/2025     Ordering Provider:  [id number] TAMEKA ZURITA MRN/PID:          [id number]      Fellow: Accession#:       [id number]  Technologist:       Cary Cadena RVT, RDMS YOB: 1955    Technologist 2: Gender:           M             Encounter#:         [id number] Admission Status: Inpatient     Location Performed: [facility]  Diagnosis/ICD: Other pulmonary embolism without acute cor pulmonale-I26.99;                Shortness of breath-R06.02 Indication:    PE, SOB CPT Codes:     18886 Peripheral venous duplex scan for DVT complete  Pertinent History: PE and Hyperlipidemia.  **CRITICAL RESULT** Critical Result: Acute DVT in the left peroneal vein. Notification called to Dr. Diane Cronin & Holli Cartwright RN on 7/16/2025 at 10:35:07 AM. Acknowledged critical results notification communicated via secure chat by Cary Cadena RDMS, RVT.  PRELIMINARY CONCLUSIONS:  Right Lower Venous: No evidence of acute deep vein thrombus visualized in the right lower extremity. Left Lower Venous: There is acute occlusive deep vein thrombosis visualized in the peroneal vein. The remainder of the left leg is negative for deep vein thrombosis.  Imaging & Doppler Findings:  Right                 Compressible Thrombus        Flow Distal External Iliac     Yes        None   Spontaneous/Phasic CFV                       Yes         None   Spontaneous/Phasic PFV                       Yes        None FV Proximal               Yes        None   Spontaneous/Phasic FV Mid                    Yes        None FV Distal                 Yes        None Popliteal                 Yes        None   Spontaneous/Phasic Peroneal                  Yes        None PTV                       Yes        None  Left                  Compress    Thrombus            Flow Distal External Iliac   Yes         None       Spontaneous/Phasic CFV                     Yes         None       Spontaneous/Phasic PFV                     Yes         None FV Proximal             Yes         None       Spontaneous/Phasic FV Mid                  Yes         None FV Distal               Yes         None Popliteal               Yes         None       Spontaneous/Phasic Peroneal                 No    Acute occlusive PTV                     Yes         None  VASCULAR PRELIMINARY REPORT completed by Cary Cadena RVT on 7/16/2025 at 10:35:20 AM  ** Final **     ECG 12 lead  Result Date: 7/16/2025  Normal sinus rhythm Normal ECG When compared with ECG of 08-JUL-2025 09:30, Criteria for Septal infarct are no longer Present    ECG 12 lead  Result Date: 7/16/2025  Normal sinus rhythm Rightward axis ST & T wave abnormality, consider inferior ischemia Abnormal ECG When compared with ECG of 15-JUL-2025 15:07, (unconfirmed) QRS axis Shifted right T wave inversion now evident in Inferior leads T wave amplitude has increased in Lateral leads    CT angio chest for pulmonary embolism  Result Date: 7/16/2025  Interpreted By:  Paul Parker, STUDY: CT ANGIO CHEST FOR PULMONARY EMBOLISM;  7/15/2025 11:12 pm   INDICATION: Signs/Symptoms:SOB with sensation of suffocation while laying down.     COMPARISON: Chest film today.   ACCESSION NUMBER(S): [id number]   ORDERING CLINICIAN: CYRUS RYAN   TECHNIQUE: Helical data acquisition of the chest was obtained after intravenous administration of 60 ML  Omnipaque 350, as per PE protocol. Images were reformatted in coronal and sagittal planes. Axial and coronal maximum intensity projection (MIP) images were created and reviewed.   FINDINGS: POTENTIAL LIMITATIONS OF THE STUDY: None   Segmental emboli bilateral lower lobes, right middle lobe. Minimal embolus in the left lower lobe lobar artery. No saddle embolus. Overall small-to-moderate embolic disease burden. RV to LV ratio slightly greater than one-to-one. Overall mild cardiomegaly.   No pneumothorax. Atelectasis both lungs dependently. Trace bilateral pleural fluid. No aortic aneurysm or dissection. Normal caliber pulmonary trunk.   Osseous structures grossly intact. Upper abdominal contents grossly unremarkable.       Segmental emboli bilateral lower lobes, right middle lobe. Minimal embolus in the left lower lobe lobar artery. No saddle embolus. Overall small-to-moderate embolic disease burden. CT findings may indicate early right heart strain.   Atelectasis both lungs dependently. Trace bilateral pleural fluid.     MACRO: Paul Parker discussed the significance and urgency of this critical finding by EPIC secure chat with  CYRUS RYAN on 7/16/2025 at 12:19 am.  (**-RCF-**) Findings:  See findings.   Signed by: Paul Parker 7/16/2025 12:19 AM Dictation workstation:   [id number]    XR chest 1 view  Result Date: 7/15/2025  Interpreted By:  Janet Morales, STUDY: XR CHEST 1 VIEW;  7/15/2025 3:51 pm   INDICATION: Signs/Symptoms:Chest Pain.   COMPARISON: 07/08/2025   ACCESSION NUMBER(S): [id number]   ORDERING CLINICIAN: MAC THOMPSON   FINDINGS: The inspiration is sub-optimal. The patient is rotated.   There is a rounded patchy area in the right upper lung. There is patchiness at the right base. There is patchiness at the left lung base.   The mediastinum is unremarkable. There are degenerative changes of the spine.   There are surgical clips in the right upper quadrant.   COMPARISON OF FINDING: Worsening  infiltrates at the lung bases.       Bilateral parenchymal infiltration. Treatment and follow-up is suggested.   MACRO: none   Signed by: Janet Morales 7/15/2025 3:54 PM Dictation workstation:   [id number]      Scheduled medications  Scheduled Medications[1]  Continuous medications  Continuous Medications[2]  PRN medications  PRN Medications[3]    Assessment and Plan:  Mr. 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    #bilateral PE  #hypoxia  #recent treatment for CAP  -CT PE with segmental emboli in bilateral lower lobes, right middle lobe and left lower lobe.  Radiographic findings of early right heart strain.  CT also demonstrates bibasilar atelectasis and trace bilateral pleural effusion.  -PESI score 80, indicating class II, low risk PE.  -Troponin and BNP low.  - Given RHS seen on CT, TTE ordered and pending  -DVT US with acute occlusive thrombus in L peroneal vein  -suspect PE provoked in the setting of recent pneumonia/hospitalization   -continue heparin gtt   -will plan to switch to DOAC tonight vs tmrw if patient remains HDS. He will need AC for at least 3 months for first provoked VTE.   -patient should follow up with hematology vs PCP   -wean NC as tolerated  -low suspicion recurrent PNA - procal is low. Will discontinue abx    #Albion's disease  -on prednisone 5mg daily  -continue hydrocortisone 25mg TID for now, may taper vs resume home dosing tmrw    #acute on chronic hyponatremia  -noted during prior admission as well  -suspect hypovolemic hyponatremia  -will bolus 1L NS and reassess   -follow up urine Na and urine osms  -consider nephrology consult if hyponatremia persists    #preDM  -HbA1c 5.8%  -SSI while on steroids    Misc: continue home statin, flonase, levothyroxine,     FEN/GI: regular diet  Access: PIV  Ppx:  on AC  Code: full     Diane Cronin MD           [1] atorvastatin, 40 mg, oral, Daily  budesonide, 0.25 mg, nebulization, BID  fluticasone, 1 spray, Each Nostril, Daily  hydrocortisone, 25 mg, oral, q8h EILEEN  ipratropium-albuteroL, 3 mL, nebulization, 4x daily  levothyroxine, 50 mcg, oral, Daily  oxygen, , inhalation, Continuous - Inhalation  perflutren protein A microsphere, 0.5 mL, intravenous, Once in imaging  polyethylene glycol, 17 g, oral, Daily  [Held by provider] predniSONE, 5 mg, oral, Daily  [2] heparin, 0-4,500 Units/hr, Last Rate: 1,700 Units/hr (07/16/25 1043)  [3] PRN medications: acetaminophen, albuterol, heparin, ondansetron, oxyCODONE

## 2025-07-16 NOTE — CARE PLAN
0810- Patient going down for tests.    1000- Patient arrived back on the unit. Dr Cronin at bedside and notified he has been off his heparin drip longer than he was suppose it. Per MD rodriguez to restart at the 1700 units/hr and to draw assay in 4 hours.    1700- Per Dr Cronin to stop heparin drip at 2100. Next assay to be drawn at 1900. Per MD rodriguez to not draw assay as heparin drip will be stopped at 2100.     EOS- No acute changes throughout the shift. Patient received pain medication with decrease in pain. Patient still has shortness of breath worse with exertion. Patient brother at bedside this afternoon and Dr Cronin updated them both at bedside. Safety maintained and call light within reach.

## 2025-07-17 ENCOUNTER — APPOINTMENT (OUTPATIENT)
Dept: RADIOLOGY | Facility: HOSPITAL | Age: 70
DRG: 175 | End: 2025-07-17
Payer: MEDICARE

## 2025-07-17 PROBLEM — I82.452 ACUTE DEEP VEIN THROMBOSIS (DVT) OF LEFT PERONEAL VEIN (MULTI): Status: ACTIVE | Noted: 2025-07-17

## 2025-07-17 LAB
ANION GAP SERPL CALC-SCNC: 12 MMOL/L (ref 10–20)
AORTIC VALVE PEAK VELOCITY: 1.22 M/S
ATRIAL RATE: 61 BPM
ATRIAL RATE: 67 BPM
AV PEAK GRADIENT: 6 MMHG
AVA (PEAK VEL): 2.59 CM2
BUN SERPL-MCNC: 19 MG/DL (ref 6–23)
CALCIUM SERPL-MCNC: 8.3 MG/DL (ref 8.6–10.3)
CHLORIDE SERPL-SCNC: 97 MMOL/L (ref 98–107)
CO2 SERPL-SCNC: 21 MMOL/L (ref 21–32)
CREAT SERPL-MCNC: 0.96 MG/DL (ref 0.5–1.3)
EGFRCR SERPLBLD CKD-EPI 2021: 85 ML/MIN/1.73M*2
EJECTION FRACTION APICAL 4 CHAMBER: 64.5
EJECTION FRACTION: 63 %
ERYTHROCYTE [DISTWIDTH] IN BLOOD BY AUTOMATED COUNT: 12.1 % (ref 11.5–14.5)
GLUCOSE BLD MANUAL STRIP-MCNC: 126 MG/DL (ref 74–99)
GLUCOSE BLD MANUAL STRIP-MCNC: 132 MG/DL (ref 74–99)
GLUCOSE BLD MANUAL STRIP-MCNC: 133 MG/DL (ref 74–99)
GLUCOSE BLD MANUAL STRIP-MCNC: 197 MG/DL (ref 74–99)
GLUCOSE SERPL-MCNC: 119 MG/DL (ref 74–99)
HCT VFR BLD AUTO: 34.9 % (ref 41–52)
HGB BLD-MCNC: 12.3 G/DL (ref 13.5–17.5)
LEFT ATRIUM VOLUME AREA LENGTH INDEX BSA: 16.6 ML/M2
LEFT VENTRICLE INTERNAL DIMENSION DIASTOLE: 4.47 CM (ref 3.5–6)
LEFT VENTRICULAR OUTFLOW TRACT DIAMETER: 2.01 CM
LV EJECTION FRACTION BIPLANE: 68 %
MAGNESIUM SERPL-MCNC: 2 MG/DL (ref 1.6–2.4)
MCH RBC QN AUTO: 30.9 PG (ref 26–34)
MCHC RBC AUTO-ENTMCNC: 35.2 G/DL (ref 32–36)
MCV RBC AUTO: 88 FL (ref 80–100)
MITRAL VALVE E/A RATIO: 0.87
NRBC BLD-RTO: 0 /100 WBCS (ref 0–0)
OSMOLALITY UR: 759 MOSM/KG (ref 200–1200)
P AXIS: 36 DEGREES
P AXIS: 42 DEGREES
P OFFSET: 191 MS
P OFFSET: 199 MS
P ONSET: 134 MS
P ONSET: 139 MS
PLATELET # BLD AUTO: 489 X10*3/UL (ref 150–450)
POTASSIUM SERPL-SCNC: 4.1 MMOL/L (ref 3.5–5.3)
PR INTERVAL: 158 MS
PR INTERVAL: 166 MS
Q ONSET: 213 MS
Q ONSET: 222 MS
QRS COUNT: 10 BEATS
QRS COUNT: 11 BEATS
QRS DURATION: 82 MS
QRS DURATION: 84 MS
QT INTERVAL: 402 MS
QT INTERVAL: 414 MS
QTC CALCULATION(BAZETT): 416 MS
QTC CALCULATION(BAZETT): 424 MS
QTC FREDERICIA: 416 MS
QTC FREDERICIA: 417 MS
R AXIS: 109 DEGREES
R AXIS: 9 DEGREES
RBC # BLD AUTO: 3.98 X10*6/UL (ref 4.5–5.9)
RIGHT VENTRICLE FREE WALL PEAK S': 19.36 CM/S
RIGHT VENTRICLE PEAK SYSTOLIC PRESSURE: 36 MMHG
SODIUM SERPL-SCNC: 126 MMOL/L (ref 136–145)
T AXIS: -13 DEGREES
T AXIS: 41 DEGREES
T OFFSET: 414 MS
T OFFSET: 429 MS
TRICUSPID ANNULAR PLANE SYSTOLIC EXCURSION: 2.1 CM
VENTRICULAR RATE: 61 BPM
VENTRICULAR RATE: 67 BPM
WBC # BLD AUTO: 17 X10*3/UL (ref 4.4–11.3)

## 2025-07-17 PROCEDURE — 1200000002 HC GENERAL ROOM WITH TELEMETRY DAILY

## 2025-07-17 PROCEDURE — 85027 COMPLETE CBC AUTOMATED: CPT

## 2025-07-17 PROCEDURE — 71045 X-RAY EXAM CHEST 1 VIEW: CPT

## 2025-07-17 PROCEDURE — 83735 ASSAY OF MAGNESIUM: CPT

## 2025-07-17 PROCEDURE — 82947 ASSAY GLUCOSE BLOOD QUANT: CPT

## 2025-07-17 PROCEDURE — 2500000001 HC RX 250 WO HCPCS SELF ADMINISTERED DRUGS (ALT 637 FOR MEDICARE OP)

## 2025-07-17 PROCEDURE — 2500000002 HC RX 250 W HCPCS SELF ADMINISTERED DRUGS (ALT 637 FOR MEDICARE OP, ALT 636 FOR OP/ED): Performed by: STUDENT IN AN ORGANIZED HEALTH CARE EDUCATION/TRAINING PROGRAM

## 2025-07-17 PROCEDURE — 2500000001 HC RX 250 WO HCPCS SELF ADMINISTERED DRUGS (ALT 637 FOR MEDICARE OP): Performed by: STUDENT IN AN ORGANIZED HEALTH CARE EDUCATION/TRAINING PROGRAM

## 2025-07-17 PROCEDURE — 94640 AIRWAY INHALATION TREATMENT: CPT

## 2025-07-17 PROCEDURE — 2500000004 HC RX 250 GENERAL PHARMACY W/ HCPCS (ALT 636 FOR OP/ED): Performed by: STUDENT IN AN ORGANIZED HEALTH CARE EDUCATION/TRAINING PROGRAM

## 2025-07-17 PROCEDURE — 36415 COLL VENOUS BLD VENIPUNCTURE: CPT

## 2025-07-17 PROCEDURE — 80048 BASIC METABOLIC PNL TOTAL CA: CPT

## 2025-07-17 PROCEDURE — 99233 SBSQ HOSP IP/OBS HIGH 50: CPT | Performed by: STUDENT IN AN ORGANIZED HEALTH CARE EDUCATION/TRAINING PROGRAM

## 2025-07-17 PROCEDURE — 71045 X-RAY EXAM CHEST 1 VIEW: CPT | Performed by: RADIOLOGY

## 2025-07-17 PROCEDURE — 2500000002 HC RX 250 W HCPCS SELF ADMINISTERED DRUGS (ALT 637 FOR MEDICARE OP, ALT 636 FOR OP/ED)

## 2025-07-17 PROCEDURE — 2500000005 HC RX 250 GENERAL PHARMACY W/O HCPCS: Performed by: STUDENT IN AN ORGANIZED HEALTH CARE EDUCATION/TRAINING PROGRAM

## 2025-07-17 RX ORDER — HYDROCORTISONE 5 MG/1
25 TABLET ORAL EVERY 12 HOURS SCHEDULED
Status: DISCONTINUED | OUTPATIENT
Start: 2025-07-17 | End: 2025-07-17

## 2025-07-17 RX ORDER — HYDROCODONE BITARTRATE AND HOMATROPINE METHYLBROMIDE ORAL SOLUTION 5; 1.5 MG/5ML; MG/5ML
5 LIQUID ORAL EVERY 6 HOURS PRN
Status: DISCONTINUED | OUTPATIENT
Start: 2025-07-17 | End: 2025-07-19 | Stop reason: HOSPADM

## 2025-07-17 RX ORDER — HYDROCORTISONE 5 MG/1
25 TABLET ORAL EVERY 8 HOURS SCHEDULED
Status: DISCONTINUED | OUTPATIENT
Start: 2025-07-17 | End: 2025-07-18

## 2025-07-17 RX ADMIN — OXYCODONE HYDROCHLORIDE 5 MG: 5 TABLET ORAL at 21:38

## 2025-07-17 RX ADMIN — SODIUM CHLORIDE 1 G: 1 TABLET ORAL at 09:45

## 2025-07-17 RX ADMIN — FLUTICASONE PROPIONATE 1 SPRAY: 50 SPRAY, METERED NASAL at 09:44

## 2025-07-17 RX ADMIN — HYDROCORTISONE 25 MG: 5 TABLET ORAL at 21:39

## 2025-07-17 RX ADMIN — Medication 2 L/MIN: at 04:00

## 2025-07-17 RX ADMIN — IPRATROPIUM BROMIDE AND ALBUTEROL SULFATE 3 ML: 2.5; .5 SOLUTION RESPIRATORY (INHALATION) at 21:35

## 2025-07-17 RX ADMIN — PREDNISONE 5 MG: 5 TABLET ORAL at 09:45

## 2025-07-17 RX ADMIN — HYDROCORTISONE 25 MG: 5 TABLET ORAL at 15:41

## 2025-07-17 RX ADMIN — SODIUM CHLORIDE 1 G: 1 TABLET ORAL at 12:36

## 2025-07-17 RX ADMIN — Medication 2 L/MIN: at 09:43

## 2025-07-17 RX ADMIN — Medication 3 L/MIN: at 00:00

## 2025-07-17 RX ADMIN — ATORVASTATIN CALCIUM 40 MG: 40 TABLET, FILM COATED ORAL at 09:45

## 2025-07-17 RX ADMIN — APIXABAN 10 MG: 5 TABLET, FILM COATED ORAL at 21:39

## 2025-07-17 RX ADMIN — APIXABAN 10 MG: 5 TABLET, FILM COATED ORAL at 09:45

## 2025-07-17 RX ADMIN — BUDESONIDE 0.25 MG: 0.25 INHALANT RESPIRATORY (INHALATION) at 21:35

## 2025-07-17 RX ADMIN — SODIUM CHLORIDE 1 G: 1 TABLET ORAL at 16:58

## 2025-07-17 RX ADMIN — LEVOTHYROXINE SODIUM 50 MCG: 0.05 TABLET ORAL at 06:00

## 2025-07-17 RX ADMIN — HYDROCODONE BITARTRATE AND HOMATROPINE METHYLBROMIDE 5 ML: 1.5; 5 SOLUTION ORAL at 10:43

## 2025-07-17 RX ADMIN — INSULIN LISPRO 1 UNITS: 100 INJECTION, SOLUTION INTRAVENOUS; SUBCUTANEOUS at 16:58

## 2025-07-17 ASSESSMENT — PAIN SCALES - GENERAL
PAINLEVEL_OUTOF10: 8
PAINLEVEL_OUTOF10: 0 - NO PAIN
PAINLEVEL_OUTOF10: 4
PAINLEVEL_OUTOF10: 0 - NO PAIN
PAINLEVEL_OUTOF10: 6

## 2025-07-17 ASSESSMENT — COGNITIVE AND FUNCTIONAL STATUS - GENERAL
MOBILITY SCORE: 19
TURNING FROM BACK TO SIDE WHILE IN FLAT BAD: A LITTLE
MOVING TO AND FROM BED TO CHAIR: A LITTLE
MOBILITY SCORE: 19
DAILY ACTIVITIY SCORE: 22
WALKING IN HOSPITAL ROOM: A LITTLE
WALKING IN HOSPITAL ROOM: A LITTLE
DRESSING REGULAR LOWER BODY CLOTHING: A LITTLE
CLIMB 3 TO 5 STEPS WITH RAILING: A LITTLE
TOILETING: A LITTLE
STANDING UP FROM CHAIR USING ARMS: A LITTLE
TURNING FROM BACK TO SIDE WHILE IN FLAT BAD: A LITTLE
CLIMB 3 TO 5 STEPS WITH RAILING: A LITTLE
DRESSING REGULAR LOWER BODY CLOTHING: A LITTLE
TOILETING: A LITTLE
MOVING TO AND FROM BED TO CHAIR: A LITTLE
STANDING UP FROM CHAIR USING ARMS: A LITTLE
DAILY ACTIVITIY SCORE: 22

## 2025-07-17 ASSESSMENT — PAIN DESCRIPTION - LOCATION: LOCATION: SHOULDER

## 2025-07-17 ASSESSMENT — PAIN DESCRIPTION - DESCRIPTORS
DESCRIPTORS: STABBING
DESCRIPTORS: DULL
DESCRIPTORS: SHARP

## 2025-07-17 ASSESSMENT — PAIN - FUNCTIONAL ASSESSMENT
PAIN_FUNCTIONAL_ASSESSMENT: 0-10

## 2025-07-17 ASSESSMENT — PAIN DESCRIPTION - ORIENTATION: ORIENTATION: LEFT

## 2025-07-17 NOTE — NURSING NOTE
Rounded on pt at this time. Pt refusing bed alarm at time, instructed to utilize call light to get up OOB. He voiced understanding.

## 2025-07-17 NOTE — PROGRESS NOTES
Adrian Rosa is a 69 y.o. male on day 2 of admission presenting with Pneumonia of both lungs due to infectious organism, unspecified part of lung.      Subjective   Patient with significant cough this AM associated with L sided pleuritic chest pain. He is producing blood tinged sputum.    No fever/chills.     Objective     Last Recorded Vitals  /72 (BP Location: Right arm, Patient Position: Lying)   Pulse 61   Temp 36.3 °C (97.3 °F) (Temporal)   Resp 20   Wt 92.4 kg (203 lb 11.3 oz)   SpO2 95%   Intake/Output last 3 Shifts:    Intake/Output Summary (Last 24 hours) at 7/17/2025 1505  Last data filed at 7/17/2025 1000  Gross per 24 hour   Intake 742.75 ml   Output 1300 ml   Net -557.25 ml       Admission Weight  Weight: 94.3 kg (208 lb) (07/15/25 1500)    Daily Weight  07/15/25 : 92.4 kg (203 lb 11.3 oz)    Physical Exam  Gen: mild distress, persistent coughing  HEENT: MMM, EOMI, no scleral icterus  CV: RRR, no murmurs appreciated  Lungs: patient has good air entry bilaterally. Bibasilar crackles noted.   Abd: soft, nontender, nondistended, normoactive BS  Ext: WWP, no edema  Skin: no rashes/lesions  Neuro: Cns III-XII grossly intact, alert and oriented x3  Psych: appropriate mood and affect     Relevant Results  Results for orders placed or performed during the hospital encounter of 07/15/25 (from the past 24 hours)   POCT GLUCOSE   Result Value Ref Range    POCT Glucose 149 (H) 74 - 99 mg/dL   Urine electrolytes   Result Value Ref Range    Sodium, Urine Random 55 mmol/L    Sodium/Creatinine Ratio 34 Not established. mmol/g Creat    Potassium, Urine Random 72 mmol/L    Potassium/Creatinine Ratio 44 Not established mmol/g Creat    Chloride, Urine Random 72 mmol/L    Chloride/Creatinine Ratio 44 23 - 275 mmol/g creat    Creatinine, Urine Random 162.1 20.0 - 370.0 mg/dL   Osmolality, urine   Result Value Ref Range    Osmolality, Urine Random 759 200 - 1,200 mOsm/kg   POCT GLUCOSE   Result Value Ref Range     POCT Glucose 180 (H) 74 - 99 mg/dL   Basic metabolic panel   Result Value Ref Range    Glucose 119 (H) 74 - 99 mg/dL    Sodium 126 (L) 136 - 145 mmol/L    Potassium 4.1 3.5 - 5.3 mmol/L    Chloride 97 (L) 98 - 107 mmol/L    Bicarbonate 21 21 - 32 mmol/L    Anion Gap 12 10 - 20 mmol/L    Urea Nitrogen 19 6 - 23 mg/dL    Creatinine 0.96 0.50 - 1.30 mg/dL    eGFR 85 >60 mL/min/1.73m*2    Calcium 8.3 (L) 8.6 - 10.3 mg/dL   CBC   Result Value Ref Range    WBC 17.0 (H) 4.4 - 11.3 x10*3/uL    nRBC 0.0 0.0 - 0.0 /100 WBCs    RBC 3.98 (L) 4.50 - 5.90 x10*6/uL    Hemoglobin 12.3 (L) 13.5 - 17.5 g/dL    Hematocrit 34.9 (L) 41.0 - 52.0 %    MCV 88 80 - 100 fL    MCH 30.9 26.0 - 34.0 pg    MCHC 35.2 32.0 - 36.0 g/dL    RDW 12.1 11.5 - 14.5 %    Platelets 489 (H) 150 - 450 x10*3/uL   Magnesium   Result Value Ref Range    Magnesium 2.00 1.60 - 2.40 mg/dL   POCT GLUCOSE   Result Value Ref Range    POCT Glucose 132 (H) 74 - 99 mg/dL   POCT GLUCOSE   Result Value Ref Range    POCT Glucose 126 (H) 74 - 99 mg/dL       XR chest 1 view  Result Date: 7/17/2025  Interpreted By:  Vaughn Flowers, STUDY: XR CHEST 1 VIEW;  7/17/2025 12:04 pm   INDICATION: Signs/Symptoms:Worsening cough.   COMPARISON: 07/15/2025   ACCESSION NUMBER(S): [id number]   ORDERING CLINICIAN: TORREY ESTRADA   FINDINGS: Right upper lobe consolidation unchanged. Left basilar opacities have increased. No apparent pleural effusion. Cardiomediastinal silhouette unchanged. No pulmonary vascular congestion.       Unchanged right upper lobe consolidation. Increasing left basilar consolidation.   MACRO: None   Signed by: Vaughn Flowers 7/17/2025 1:09 PM Dictation workstation:   [id number]    Transthoracic Echo Complete  Result Date: 7/17/2025            [facility] [address], [place] [id number]    Tel [phone] Fax [phone] TRANSTHORACIC ECHOCARDIOGRAM REPORT Patient Name:       LYNDSEY Zepeda Physician:    [id number] Boo                                                                 Judith ESTRADA Study Date:         7/16/2025            Ordering Provider:    [id number] TAMEKA ZURITA MRN/PID:            [id number]             Fellow: Accession#:         [id number]         Nurse: Date of Birth/Age:  1955 / 69      Sonographer:          Sallie Alanis RDCS                     years Gender Assigned at  M                    Additional Staff: Birth: Height:             170.18 cm            Admit Date:           7/15/2025 Weight:             92.08 kg             Admission Status:     Inpatient -                                                                Routine BSA / BMI:          2.03 m2 / 31.79      Department Location:  [facility]                     kg/m2 Blood Pressure: 136 /79 mmHg Study Type:    TRANSTHORACIC ECHO (TTE) COMPLETE Diagnosis/ICD: Multiple subsegmental pulmonary emboli without acute cor                pulmonale-I26.94 Indication:    Pulmonary embolism CPT Codes:     Echo Complete w Full Doppler-00733 Patient History: Pertinent History: Dyspnea and Chest Pain. LUIS FERNANDO; no previous echocardiogram. Study Detail: The following Echo studies were performed: 2D, M-Mode, Doppler and               color flow. Technically challenging study due to body habitus and               patient lying in supine position. Definity used as a contrast               agent for endocardial border definition. Total contrast used for               this procedure was 3 mL via IV push. A bubble study was not               performed.  PHYSICIAN INTERPRETATION: Left Ventricle: The left ventricular systolic function is normal with a visually estimated ejection fraction of 60-65%. There are no regional wall motion abnormalities. The left ventricular cavity size is normal. There is normal septal and normal posterior left ventricular wall thickness. Spectral Doppler shows a Grade I (impaired relaxation pattern) of  left ventricular diastolic filling with normal left atrial filling pressure. Left Atrium: The left atrial size is normal. A bubble study using agitated saline was not performed. Right Ventricle: The right ventricle is normal in size. There is normal right ventricular global systolic function. Normal TAPSE. Right Atrium: The right atrial size is normal. Aortic Valve: The aortic valve is trileaflet. There is minimal aortic valve cusp calcification. There is no evidence of aortic valve regurgitation. Mitral Valve: The mitral valve is normal in structure. The doppler estimated peak and mean diastolic gradients are 3 mmHg and 1 mmHg, respectively. There is no evidence of mitral valve regurgitation. The E Vmax is 0.63 m/s. Tricuspid Valve: The tricuspid valve is structurally normal. There is trace tricuspid regurgitation. The Doppler estimated right ventricular systolic pressure (RVSP) is mildly elevated at 36 mmHg. Pulmonic Valve: The pulmonic valve is structurally normal. There is trace pulmonic valve regurgitation. Pericardium: No pericardial effusion noted. Aorta: The aortic root is normal. Systemic Veins: The inferior vena cava was not well visualized, IVC inspiratory collapse is not well visualized.  CONCLUSIONS:  1. The left ventricular systolic function is normal with a visually estimated ejection fraction of 60-65%.  2. Spectral Doppler shows a Grade I (impaired relaxation pattern) of left ventricular diastolic filling with normal left atrial filling pressure.  3. There is normal right ventricular global systolic function.  4. Normal TAPSE.  5. The Doppler estimated RVSP is mildly elevated at 36 mmHg. QUANTITATIVE DATA SUMMARY:  2D MEASUREMENTS:             Normal Ranges: LAs:             3.59 cm     (2.7-4.0cm) IVSd:            0.78 cm     (0.6-1.1cm) LVPWd:           0.90 cm     (0.6-1.1cm) LVIDd:           4.47 cm     (3.9-5.9cm) LVIDs:           2.62 cm LV Mass Index:   58.8 g/m2 LVEDV Index:     31.89 ml/m2  LV % FS          41.5 %  LEFT ATRIUM:                  Normal Ranges: LA Vol A4C:        30.2 ml    (22+/-6mL/m2) LA Vol A2C:        34.0 ml LA Vol BP:         33.8 ml LA Vol Index A4C:  14.8ml/m2 LA Vol Index A2C:  16.7 ml/m2 LA Vol Index BP:   16.6 ml/m2 LA Area A4C:       14.6 cm2 LA Area A2C:       14.7 cm2 LA Major Axis A4C: 6.0 cm LA Major Axis A2C: 5.4 cm LA Volume Index:   16.6 ml/m2 LA Vol A4C:        29.6 ml LA Vol A2C:        31.9 ml LA Vol Index BSA:  15.1 ml/m2  AORTA MEASUREMENTS:         Normal Ranges: Ao Sinus, d:        3.50 cm (2.1-3.5cm) Ao STJ, d:          2.80 cm (1.7-3.4cm) Asc Ao, d:          2.80 cm (2.1-3.4cm)  LV SYSTOLIC FUNCTION:                      Normal Ranges: EF-A4C View:    64 % (>=55%) EF-A2C View:    68 % EF-Biplane:     68 % EF-Visual:      63 % LV EF Reported: 63 %  LV DIASTOLIC FUNCTION:           Normal Ranges: MV Peak E:             0.63 m/s  (0.7-1.2 m/s) MV Peak A:             0.73 m/s  (0.42-0.7 m/s) E/A Ratio:             0.87      (1.0-2.2) MV e'                  0.082 m/s (>8.0) MV lateral e'          0.10 m/s MV medial e'           0.06 m/s E/e' Ratio:            7.71      (<8.0)  MITRAL VALVE:          Normal Ranges: MV Vmax:      0.91 m/s (<=1.3m/s) MV peak PG:   3.3 mmHg (<5mmHg) MV mean P.0 mmHg (<48mmHg) MV VTI:       30.98 cm (10-13cm) MV DT:        344 msec (150-240msec)  AORTIC VALVE:           Normal Ranges: AoV Vmax:      1.22 m/s (<=1.7m/s) AoV Peak P.0 mmHg (<20mmHg) LVOT Max Dawood:  0.99 m/s (<=1.1m/s) LVOT VTI:      19.55 cm LVOT Diameter: 2.01 cm  (1.8-2.4cm) AoV Area,Vmax: 2.59 cm2 (2.5-4.5cm2)  RIGHT VENTRICLE: RV Basal 3.67 cm RV Mid   3.60 cm RV Major 7.2 cm TAPSE:   21.3 mm RV s'    0.19 m/s  TRICUSPID VALVE/RVSP:          Normal Ranges: Peak TR Velocity:     2.89 m/s Est. RA Pressure:     3 mmHg RV Syst Pressure:     36 mmHg  (< 30mmHg)  PULMONIC VALVE:          Normal Ranges: PV Accel Time:  103 msec (>120ms) PV Max Dawood:     0.9 m/s   (0.6-0.9m/s) PV Max PG:      3.5 mmHg  AORTA: Asc Ao Diam 2.80 cm  [id number] Rajeev Wong MD Electronically signed on 7/17/2025 at 10:58:11 AM  ** Final **     Vascular US lower extremity venous duplex bilateral  Result Date: 7/16/2025            [facility] [address] [place] [address]     Tel [phone] Fax [phone]  Vascular Lab Report  VASC US LOWER EXTREMITY VENOUS DUPLEX BILATERAL Patient Name:      LYNDSEY BERMUDEZ         Reading Physician:  [id number] Ezekiel Astudillo MD Study Date:        7/16/2025             Ordering Provider:  [id number] TAMEKA ZURITA MRN/PID:           [id number]              Fellow: Accession#:        [id number]          Technologist:       Cary Cadena RVT, RDMS Date of Birth/Age: 1955 / 69 years Technologist 2: Gender:            M                     Encounter#:         [id number] Admission Status:  Inpatient             Location Performed: [facility]  Diagnosis/ICD: Other pulmonary embolism without acute cor pulmonale-I26.99;                Shortness of breath-R06.02 Indication:    PE, SOB CPT Codes:     52265 Peripheral venous duplex scan for DVT complete  Pertinent History: PE and Hyperlipidemia.  **CRITICAL RESULT** Critical Result: Acute DVT in the left peroneal vein. Notification called to Dr. Diane Cronin & Holli Cartwright RN on 7/16/2025 at 10:35:07 AM. Acknowledged critical results notification communicated via secure chat by Cary Cadena RDMS, RVT.  CONCLUSIONS: Right Lower Venous: No evidence of acute deep vein thrombus visualized in the right lower extremity. Left Lower Venous: There is acute occlusive deep vein thrombosis visualized in the peroneal vein. The remainder of the left leg is negative  for deep vein thrombosis.  Imaging & Doppler Findings:  Right                 Compressible Thrombus        Flow Distal External Iliac     Yes        None   Spontaneous/Phasic CFV                       Yes        None   Spontaneous/Phasic PFV                       Yes        None FV Proximal               Yes        None   Spontaneous/Phasic FV Mid                    Yes        None FV Distal                 Yes        None Popliteal                 Yes        None   Spontaneous/Phasic Peroneal                  Yes        None PTV                       Yes        None  Left                  Compress    Thrombus            Flow Distal External Iliac   Yes         None       Spontaneous/Phasic CFV                     Yes         None       Spontaneous/Phasic PFV                     Yes         None FV Proximal             Yes         None       Spontaneous/Phasic FV Mid                  Yes         None FV Distal               Yes         None Popliteal               Yes         None       Spontaneous/Phasic Peroneal                 No    Acute occlusive PTV                     Yes         None  [id number] Ezekiel Astudillo MD Electronically signed by [id number] Ezekiel Astudillo MD on 7/16/2025 at 4:19:04 PM  ** Final **     ECG 12 lead  Result Date: 7/16/2025  Normal sinus rhythm Normal ECG When compared with ECG of 08-JUL-2025 09:30, Criteria for Septal infarct are no longer Present    ECG 12 lead  Result Date: 7/16/2025  Normal sinus rhythm Rightward axis ST & T wave abnormality, consider inferior ischemia Abnormal ECG When compared with ECG of 15-JUL-2025 15:07, (unconfirmed) QRS axis Shifted right T wave inversion now evident in Inferior leads T wave amplitude has increased in Lateral leads    CT angio chest for pulmonary embolism  Result Date: 7/16/2025  Interpreted By:  Paul Parker, STUDY: CT ANGIO CHEST FOR PULMONARY EMBOLISM;  7/15/2025 11:12 pm   INDICATION: Signs/Symptoms:SOB with sensation of suffocation while laying  down.     COMPARISON: Chest film today.   ACCESSION NUMBER(S): [id number]   ORDERING CLINICIAN: CYRUS RYAN   TECHNIQUE: Helical data acquisition of the chest was obtained after intravenous administration of 60 ML Omnipaque 350, as per PE protocol. Images were reformatted in coronal and sagittal planes. Axial and coronal maximum intensity projection (MIP) images were created and reviewed.   FINDINGS: POTENTIAL LIMITATIONS OF THE STUDY: None   Segmental emboli bilateral lower lobes, right middle lobe. Minimal embolus in the left lower lobe lobar artery. No saddle embolus. Overall small-to-moderate embolic disease burden. RV to LV ratio slightly greater than one-to-one. Overall mild cardiomegaly.   No pneumothorax. Atelectasis both lungs dependently. Trace bilateral pleural fluid. No aortic aneurysm or dissection. Normal caliber pulmonary trunk.   Osseous structures grossly intact. Upper abdominal contents grossly unremarkable.       Segmental emboli bilateral lower lobes, right middle lobe. Minimal embolus in the left lower lobe lobar artery. No saddle embolus. Overall small-to-moderate embolic disease burden. CT findings may indicate early right heart strain.   Atelectasis both lungs dependently. Trace bilateral pleural fluid.     MACRO: Paul Parker discussed the significance and urgency of this critical finding by EPIC secure chat with  CYRUS RYAN on 7/16/2025 at 12:19 am.  (**-RCF-**) Findings:  See findings.   Signed by: Paul Parker 7/16/2025 12:19 AM Dictation workstation:   [id number]    XR chest 1 view  Result Date: 7/15/2025  Interpreted By:  Janet Morales, STUDY: XR CHEST 1 VIEW;  7/15/2025 3:51 pm   INDICATION: Signs/Symptoms:Chest Pain.   COMPARISON: 07/08/2025   ACCESSION NUMBER(S): [id number]   ORDERING CLINICIAN: MAC THOMPSON   FINDINGS: The inspiration is sub-optimal. The patient is rotated.   There is a rounded patchy area in the right upper lung. There is patchiness at the right  base. There is patchiness at the left lung base.   The mediastinum is unremarkable. There are degenerative changes of the spine.   There are surgical clips in the right upper quadrant.   COMPARISON OF FINDING: Worsening infiltrates at the lung bases.       Bilateral parenchymal infiltration. Treatment and follow-up is suggested.   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     #bilateral PE  #hypoxia  #recent treatment for CAP  -CT PE with segmental emboli in bilateral lower lobes, right middle lobe and left lower lobe.  Radiographic findings of early right heart strain.  CT also demonstrates bibasilar atelectasis and trace bilateral pleural effusion.  -PESI score 80, indicating class II, low risk PE.  -Troponin and BNP low.  - Given RHS seen on CT, TTE ordered which demonstrated normal EF and RVSP  -DVT US with acute occlusive thrombus in L peroneal vein  -suspect PE provoked in the setting of recent pneumonia/hospitalization   -patient transitioned to apixaban last night. He will need AC for at least 3 months for first provoked VTE.   -patient should follow up with hematology vs PCP   -wean NC as tolerated  -low suspicion recurrent PNA - procal is low. Will discontinue abx     #[place]'s disease  -on prednisone 5mg daily  -continue stress dose steroids today with hydrocortisone 25mg three times a day. Consider tapering tomorrow.      #acute on chronic hyponatremia  -noted during prior admission as well  - urine sodium elevated 55, urine osms elevated 759  -nephrology  consulted, suspect d/t [place]'s disease vs SIADH.   -continue stress dose steroids as above  -fluid restriction 1200cc/day  -salt tabs 1g TID     #preDM  -HbA1c 5.8%  -SSI while on steroids     Misc: continue home statin, flonase, levothyroxine,      FEN/GI: regular diet  Access: PIV  Ppx: on AC  Code: full     Dispo: pending improvement in sodium, improvement in symptoms, therapy evals.     Plan of care discussed with patient and his brother Buddy at bedside    Diane Cronin MD           [1] apixaban, 10 mg, oral, BID   Followed by  [START ON 7/23/2025] apixaban, 5 mg, oral, BID  atorvastatin, 40 mg, oral, Daily  budesonide, 0.25 mg, nebulization, BID  fluticasone, 1 spray, Each Nostril, Daily  hydrocortisone, 25 mg, oral, q12h EILEEN  insulin lispro, 0-5 Units, subcutaneous, TID AC  ipratropium-albuteroL, 3 mL, nebulization, TID  levothyroxine, 50 mcg, oral, Daily  perflutren protein A microsphere, 0.5 mL, intravenous, Once in imaging  polyethylene glycol, 17 g, oral, Daily  [Held by provider] predniSONE, 5 mg, oral, Daily  sodium chloride, 1,000 mg, oral, TID  [2]    [3] PRN medications: acetaminophen, albuterol, hydrocodone-homatropine, ondansetron, oxyCODONE, oxygen

## 2025-07-17 NOTE — CARE PLAN
Problem: Safety - Adult  Goal: Free from fall injury  Outcome: Progressing     The patient's goals for the shift include comfort/safety    The clinical goals for the shift include see plan of care

## 2025-07-17 NOTE — PROGRESS NOTES
Assessment complete.  Patient is alert and oriented X4.  Denies any N/V or pain at this time.  Is still SOB at rest.  Vital signs are stable.  Heparin drip was discontinued at 2100.  Denies any further needs.  Call light is within reach and bed alarm is in place.

## 2025-07-17 NOTE — CARE PLAN
Problem: Pain - Adult  Goal: Verbalizes/displays adequate comfort level or baseline comfort level  Outcome: Progressing     Problem: Safety - Adult  Goal: Free from fall injury  Outcome: Progressing     Problem: Discharge Planning  Goal: Discharge to home or other facility with appropriate resources  Outcome: Progressing     Problem: Chronic Conditions and Co-morbidities  Goal: Patient's chronic conditions and co-morbidity symptoms are monitored and maintained or improved  Outcome: Progressing     Problem: Nutrition  Goal: Nutrient intake appropriate for maintaining nutritional needs  Outcome: Progressing     Problem: Fall/Injury  Goal: Not fall by end of shift  Outcome: Progressing  Goal: Be free from injury by end of the shift  Outcome: Progressing  Goal: Verbalize understanding of personal risk factors for fall in the hospital  Outcome: Progressing  Goal: Verbalize understanding of risk factor reduction measures to prevent injury from fall in the home  Outcome: Progressing  Goal: Use assistive devices by end of the shift  Outcome: Progressing  Goal: Pace activities to prevent fatigue by end of the shift  Outcome: Progressing     Problem: Pain  Goal: Takes deep breaths with improved pain control throughout the shift  Outcome: Progressing  Goal: Turns in bed with improved pain control throughout the shift  Outcome: Progressing  Goal: Walks with improved pain control throughout the shift  Outcome: Progressing     Problem: Skin  Goal: Decreased wound size/increased tissue granulation at next dressing change  Outcome: Progressing  Goal: Participates in plan/prevention/treatment measures  Outcome: Progressing  Goal: Prevent/manage excess moisture  Outcome: Progressing  Goal: Prevent/minimize sheer/friction injuries  Outcome: Progressing  Goal: Promote/optimize nutrition  Outcome: Progressing  Goal: Promote skin healing  Outcome: Progressing

## 2025-07-17 NOTE — CONSULTS
Reason For Consult  Hyponatremia    History Of Present Illness  Adrian Rosa is a 69 y.o. male h/o HTN, Addisons, p/w SOB.  He was found to have a PE and has been placed on Eliquis.  He has DUTTON still but no SOB at rest.  He has had hyponatremia recurrently for many years.  He drinks 2-3 liters of water per day and notes he used to drink Pepsi before switching to water.  He has been drinking less water than usual though with his recent admissions.  He has less ear pain than a few days ago.  He has good UOP.  He has a good appetite with no N/V or diarrhea.  His Sodium is down to 124 which is his lowest Sodium level recorded in [facility].       Past Medical History  He has a past medical history of Encounter for issue of repeat prescription (12/03/2020), Hypertension, Personal history of other endocrine, nutritional and metabolic disease (11/30/2020), and Prediabetes.    Surgical History  He has a past surgical history that includes Other surgical history (08/15/2022).     Social History  He reports that he quit smoking about 50 years ago. His smoking use included cigarettes. He has never used smokeless tobacco. He reports current alcohol use. He reports that he does not use drugs.    Family History  Family History[1]     Allergies  Penicillins, Povidone-iodine, and Streptomycin    Review of Systems  As per HPI     Physical Exam  NAD  NCAT  MMM  Supple  RRR  CTA B  Soft, nt  No LE edema  No CVAT  AAOx3  No rashes noted          I&O 24HR    Intake/Output Summary (Last 24 hours) at 7/17/2025 0836  Last data filed at 7/17/2025 0600  Gross per 24 hour   Intake 2031.7 ml   Output 1000 ml   Net 1031.7 ml       Vitals 24HR  Heart Rate:  [60-63]   Temp:  [35.6 °C (96.1 °F)-36.9 °C (98.4 °F)]   Resp:  [16-20]   BP: (114-137)/(69-81)   SpO2:  [92 %-96 %]         Relevant Results  Results for orders placed or performed during the hospital encounter of 07/15/25 (from the past 96 hours)   CBC with Differential   Result Value Ref Range     WBC 16.7 (H) 4.4 - 11.3 x10*3/uL    nRBC 0.0 0.0 - 0.0 /100 WBCs    RBC 4.72 4.50 - 5.90 x10*6/uL    Hemoglobin 14.7 13.5 - 17.5 g/dL    Hematocrit 42.7 41.0 - 52.0 %    MCV 91 80 - 100 fL    MCH 31.1 26.0 - 34.0 pg    MCHC 34.4 32.0 - 36.0 g/dL    RDW 12.4 11.5 - 14.5 %    Platelets 508 (H) 150 - 450 x10*3/uL    Neutrophils % 60.0 40.0 - 80.0 %    Immature Granulocytes %, Automated 0.8 0.0 - 0.9 %    Lymphocytes % 26.5 13.0 - 44.0 %    Monocytes % 10.8 2.0 - 10.0 %    Eosinophils % 1.7 0.0 - 6.0 %    Basophils % 0.2 0.0 - 2.0 %    Neutrophils Absolute 10.03 (H) 1.20 - 7.70 x10*3/uL    Immature Granulocytes Absolute, Automated 0.13 0.00 - 0.70 x10*3/uL    Lymphocytes Absolute 4.43 1.20 - 4.80 x10*3/uL    Monocytes Absolute 1.81 (H) 0.10 - 1.00 x10*3/uL    Eosinophils Absolute 0.29 0.00 - 0.70 x10*3/uL    Basophils Absolute 0.04 0.00 - 0.10 x10*3/uL   Comprehensive Metabolic Panel   Result Value Ref Range    Glucose 88 74 - 99 mg/dL    Sodium 126 (L) 136 - 145 mmol/L    Potassium 4.8 3.5 - 5.3 mmol/L    Chloride 95 (L) 98 - 107 mmol/L    Bicarbonate 23 21 - 32 mmol/L    Anion Gap 13 10 - 20 mmol/L    Urea Nitrogen 23 6 - 23 mg/dL    Creatinine 1.27 0.50 - 1.30 mg/dL    eGFR 61 >60 mL/min/1.73m*2    Calcium 9.0 8.6 - 10.3 mg/dL    Albumin 3.5 3.4 - 5.0 g/dL    Alkaline Phosphatase 62 33 - 136 U/L    Total Protein 8.6 (H) 6.4 - 8.2 g/dL    AST 32 9 - 39 U/L    Bilirubin, Total 0.6 0.0 - 1.2 mg/dL    ALT 37 10 - 52 U/L   Brain Natriuretic Peptide   Result Value Ref Range    BNP 11 0 - 99 pg/mL   Protime-INR   Result Value Ref Range    Protime 14.6 (H) 9.8 - 12.4 seconds    INR 1.3 (H) 0.9 - 1.1   Troponin I, High Sensitivity, Initial   Result Value Ref Range    Troponin I, High Sensitivity 6 0 - 20 ng/L   ECG 12 lead   Result Value Ref Range    Ventricular Rate 67 BPM    Atrial Rate 67 BPM    TN Interval 158 ms    QRS Duration 84 ms    QT Interval 402 ms    QTC Calculation(Bazett) 424 ms    P Axis 42 degrees    R  Axis 9 degrees    T Axis 41 degrees    QRS Count 11 beats    Q Onset 213 ms    P Onset 134 ms    P Offset 191 ms    T Offset 414 ms    QTC Fredericia 417 ms   Troponin, High Sensitivity, 1 Hour   Result Value Ref Range    Troponin I, High Sensitivity 5 0 - 20 ng/L   ECG 12 lead   Result Value Ref Range    Ventricular Rate 61 BPM    Atrial Rate 61 BPM    MI Interval 166 ms    QRS Duration 82 ms    QT Interval 414 ms    QTC Calculation(Bazett) 416 ms    P Axis 36 degrees    R Axis 109 degrees    T Axis -13 degrees    QRS Count 10 beats    Q Onset 222 ms    P Onset 139 ms    P Offset 199 ms    T Offset 429 ms    QTC Fredericia 416 ms   Blood Gas Arterial Full Panel   Result Value Ref Range    POCT pH, Arterial 7.47 (H) 7.38 - 7.42 pH    POCT pCO2, Arterial 27 (L) 38 - 42 mm Hg    POCT pO2, Arterial 74 (L) 85 - 95 mm Hg    POCT SO2, Arterial 96 94 - 100 %    POCT Oxy Hemoglobin, Arterial 94.3 94.0 - 98.0 %    POCT Hematocrit Calculated, Arterial 43.0 41.0 - 52.0 %    POCT Sodium, Arterial 124 (L) 136 - 145 mmol/L    POCT Potassium, Arterial 4.4 3.5 - 5.3 mmol/L    POCT Chloride, Arterial 99 98 - 107 mmol/L    POCT Ionized Calcium, Arterial 1.12 1.10 - 1.33 mmol/L    POCT Glucose, Arterial 105 (H) 74 - 99 mg/dL    POCT Lactate, Arterial 0.7 0.4 - 2.0 mmol/L    POCT Base Excess, Arterial -2.5 (L) -2.0 - 3.0 mmol/L    POCT HCO3 Calculated, Arterial 19.7 (L) 22.0 - 26.0 mmol/L    POCT Hemoglobin, Arterial 14.3 13.5 - 17.5 g/dL    POCT Anion Gap, Arterial 10 10 - 25 mmo/L    Patient Temperature      FiO2 50 %    Apparatus AEROSOL MASK     Flow 8.0 LPM    Site of Arterial Puncture LBA     Francisco's Test N/A    Legionella Antigen, Urine    Specimen: Clean Catch/Voided; Urine   Result Value Ref Range    L. pneumophila Urine Ag Negative Negative   Streptococcus pneumoniae Antigen, Urine    Specimen: Clean Catch/Voided; Urine   Result Value Ref Range    Streptococcus pneumoniae Ag, Urine Negative Negative   MRSA Surveillance for  Vancomycin De-escalation, PCR    Specimen: Anterior Nares; Swab   Result Value Ref Range    MRSA PCR Not Detected Not Detected   Sars-CoV-2, Influenza A/B and RSV PCR   Result Value Ref Range    Coronavirus 2019, PCR Not Detected Not Detected    Flu A Result Not Detected Not Detected    Flu B Result Not Detected Not Detected    RSV PCR Not Detected Not Detected   Blood Culture    Specimen: Peripheral Venipuncture; Blood culture   Result Value Ref Range    Blood Culture No growth at 1 day    Procalcitonin   Result Value Ref Range    Procalcitonin 0.09 (H) <=0.07 ng/mL   Blood Culture    Specimen: Peripheral Venipuncture; Blood culture   Result Value Ref Range    Blood Culture No growth at 1 day    Light Blue Top   Result Value Ref Range    Extra Tube Hold for add-ons.    Lavender Top   Result Value Ref Range    Extra Tube Hold for add-ons.    PST Top   Result Value Ref Range    Extra Tube Hold for add-ons.    B-Type Natriuretic Peptide   Result Value Ref Range    BNP 12 0 - 99 pg/mL   Troponin I, High Sensitivity   Result Value Ref Range    Troponin I, High Sensitivity 7 0 - 20 ng/L   Heparin Assay, UFH   Result Value Ref Range    Heparin Unfractionated 0.2 See Comment Below for Therapeutic Ranges IU/mL   Basic metabolic panel   Result Value Ref Range    Glucose 122 (H) 74 - 99 mg/dL    Sodium 124 (L) 136 - 145 mmol/L    Potassium 4.8 3.5 - 5.3 mmol/L    Chloride 94 (L) 98 - 107 mmol/L    Bicarbonate 21 21 - 32 mmol/L    Anion Gap 14 10 - 20 mmol/L    Urea Nitrogen 18 6 - 23 mg/dL    Creatinine 1.18 0.50 - 1.30 mg/dL    eGFR 66 >60 mL/min/1.73m*2    Calcium 8.5 (L) 8.6 - 10.3 mg/dL   CBC   Result Value Ref Range    WBC 19.3 (H) 4.4 - 11.3 x10*3/uL    nRBC 0.0 0.0 - 0.0 /100 WBCs    RBC 4.36 (L) 4.50 - 5.90 x10*6/uL    Hemoglobin 13.6 13.5 - 17.5 g/dL    Hematocrit 39.7 (L) 41.0 - 52.0 %    MCV 91 80 - 100 fL    MCH 31.2 26.0 - 34.0 pg    MCHC 34.3 32.0 - 36.0 g/dL    RDW 12.3 11.5 - 14.5 %    Platelets 487 (H) 150 -  450 x10*3/uL   Magnesium   Result Value Ref Range    Magnesium 1.89 1.60 - 2.40 mg/dL   Troponin I, High Sensitivity   Result Value Ref Range    Troponin I, High Sensitivity 8 0 - 20 ng/L   Heparin Assay, UFH   Result Value Ref Range    Heparin Unfractionated 1.3 (HH) See Comment Below for Therapeutic Ranges IU/mL   SST TOP   Result Value Ref Range    Extra Tube Hold for add-ons.    Heparin Assay, UFH   Result Value Ref Range    Heparin Unfractionated 0.8 See Comment Below for Therapeutic Ranges IU/mL   Transthoracic Echo Complete   Result Value Ref Range    BSA 2.09 m2   Basic metabolic panel   Result Value Ref Range    Glucose 174 (H) 74 - 99 mg/dL    Sodium 124 (L) 136 - 145 mmol/L    Potassium 4.2 3.5 - 5.3 mmol/L    Chloride 95 (L) 98 - 107 mmol/L    Bicarbonate 23 21 - 32 mmol/L    Anion Gap 10 10 - 20 mmol/L    Urea Nitrogen 21 6 - 23 mg/dL    Creatinine 1.10 0.50 - 1.30 mg/dL    eGFR 72 >60 mL/min/1.73m*2    Calcium 8.0 (L) 8.6 - 10.3 mg/dL   Heparin Assay, UFH   Result Value Ref Range    Heparin Unfractionated 0.5 See Comment Below for Therapeutic Ranges IU/mL   POCT GLUCOSE   Result Value Ref Range    POCT Glucose 149 (H) 74 - 99 mg/dL   Urine electrolytes   Result Value Ref Range    Sodium, Urine Random 55 mmol/L    Sodium/Creatinine Ratio 34 Not established. mmol/g Creat    Potassium, Urine Random 72 mmol/L    Potassium/Creatinine Ratio 44 Not established mmol/g Creat    Chloride, Urine Random 72 mmol/L    Chloride/Creatinine Ratio 44 23 - 275 mmol/g creat    Creatinine, Urine Random 162.1 20.0 - 370.0 mg/dL   Osmolality, urine   Result Value Ref Range    Osmolality, Urine Random 759 200 - 1,200 mOsm/kg   POCT GLUCOSE   Result Value Ref Range    POCT Glucose 180 (H) 74 - 99 mg/dL   Basic metabolic panel   Result Value Ref Range    Glucose 119 (H) 74 - 99 mg/dL    Sodium 126 (L) 136 - 145 mmol/L    Potassium 4.1 3.5 - 5.3 mmol/L    Chloride 97 (L) 98 - 107 mmol/L    Bicarbonate 21 21 - 32 mmol/L    Anion  Gap 12 10 - 20 mmol/L    Urea Nitrogen 19 6 - 23 mg/dL    Creatinine 0.96 0.50 - 1.30 mg/dL    eGFR 85 >60 mL/min/1.73m*2    Calcium 8.3 (L) 8.6 - 10.3 mg/dL   CBC   Result Value Ref Range    WBC 17.0 (H) 4.4 - 11.3 x10*3/uL    nRBC 0.0 0.0 - 0.0 /100 WBCs    RBC 3.98 (L) 4.50 - 5.90 x10*6/uL    Hemoglobin 12.3 (L) 13.5 - 17.5 g/dL    Hematocrit 34.9 (L) 41.0 - 52.0 %    MCV 88 80 - 100 fL    MCH 30.9 26.0 - 34.0 pg    MCHC 35.2 32.0 - 36.0 g/dL    RDW 12.1 11.5 - 14.5 %    Platelets 489 (H) 150 - 450 x10*3/uL   Magnesium   Result Value Ref Range    Magnesium 2.00 1.60 - 2.40 mg/dL   POCT GLUCOSE   Result Value Ref Range    POCT Glucose 132 (H) 74 - 99 mg/dL         Assessment & Plan  Pneumonia of both lungs due to infectious organism, unspecified part of lung    Chowan's disease (Multi)    Bilateral pulmonary embolism (Multi)    Acute hyponatremia    Hyponatremia  He has a euvolemic hyponatremia    None of his medications cause hyponatremia    Consider a longer course of stress dose steroids due to his recent health problems    His urine osmolality is 759 and this should be <200 in the setting of hyponatremia.  If his Addisons is  not causing his hyponatremia, then it is SIADH.      Continue salt tabs 1 gram tid for another day  Continue protein supplement  Continue fluid restriction            Tyler Watson MD         [1]   Family History  Problem Relation Name Age of Onset    Ovarian cancer Mother      Peripheral vascular disease Mother      COPD Mother      Lung cancer Mother      Heart attack Mother      COPD Father      Heart failure Father      Heart attack Father      Heart attack Brother

## 2025-07-17 NOTE — PROGRESS NOTES
Occupational Therapy                 Therapy Communication Note    Patient Name: Adrian Rosa  MRN: [id number]  Department: [facility] 3 N  Room: [facility]3021-A  Today's Date: 7/17/2025     Discipline: Occupational Therapy    Missed Visit: OT Missed Visit: Yes     Missed Visit Reason: Missed Visit Reason: Other (Comment)    Missed Time: Attempt. OT orders received. Chart review completed. Pt with recent multiple acute Pulmonary embolisms, and recent acute DVT LLE. Will attempt to eval, when pt more medically stable and appropriate for therapy.

## 2025-07-17 NOTE — NURSING NOTE
1200- RN assumed care of pt. Report received from ELIZABETH Jose.     \1725-= pt in chair, pt refuses chair alarm and educated on call light use, pt verbalized understanding.     Pt alert and oriented x4, precautions maintained appropriately throughout RN shift. Pt denies pain. Pt continues to have productive cough. Pt VSS on 2L. Pt refusing bed and chair alarm at this time, pt educated on use of call light. Pt verbalized understanding. MD Mehrdad and Charge nurse Annemarie EASTMAN RN aware of situation. No acute issues on RN shift. Pt assist x1 with walker. Pt uses urinal. Pt stable.

## 2025-07-18 LAB
ADENOVIRUS RVP, VIRC: NOT DETECTED
ANION GAP SERPL CALC-SCNC: 11 MMOL/L (ref 10–20)
BUN SERPL-MCNC: 23 MG/DL (ref 6–23)
CALCIUM SERPL-MCNC: 8.5 MG/DL (ref 8.6–10.3)
CHLORIDE SERPL-SCNC: 99 MMOL/L (ref 98–107)
CO2 SERPL-SCNC: 22 MMOL/L (ref 21–32)
CREAT SERPL-MCNC: 1 MG/DL (ref 0.5–1.3)
EGFRCR SERPLBLD CKD-EPI 2021: 81 ML/MIN/1.73M*2
ENTEROVIRUS/RHINOVIRUS RVP, VIRC: NOT DETECTED
ERYTHROCYTE [DISTWIDTH] IN BLOOD BY AUTOMATED COUNT: 12.4 % (ref 11.5–14.5)
GLUCOSE BLD MANUAL STRIP-MCNC: 126 MG/DL (ref 74–99)
GLUCOSE BLD MANUAL STRIP-MCNC: 143 MG/DL (ref 74–99)
GLUCOSE BLD MANUAL STRIP-MCNC: 149 MG/DL (ref 74–99)
GLUCOSE BLD MANUAL STRIP-MCNC: 199 MG/DL (ref 74–99)
GLUCOSE SERPL-MCNC: 118 MG/DL (ref 74–99)
HCT VFR BLD AUTO: 35.3 % (ref 41–52)
HGB BLD-MCNC: 12.2 G/DL (ref 13.5–17.5)
HUMAN BOCAVIRUS RVP, VIRC: NOT DETECTED
HUMAN CORONAVIRUS RVP, VIRC: NOT DETECTED
INFLUENZA A , VIRC: NOT DETECTED
INFLUENZA A H1N1-09 , VIRC: NOT DETECTED
INFLUENZA B PCR, VIRC: NOT DETECTED
MAGNESIUM SERPL-MCNC: 1.99 MG/DL (ref 1.6–2.4)
MCH RBC QN AUTO: 31.3 PG (ref 26–34)
MCHC RBC AUTO-ENTMCNC: 34.6 G/DL (ref 32–36)
MCV RBC AUTO: 91 FL (ref 80–100)
METAPNEUMOVIRUS , VIRC: NOT DETECTED
NRBC BLD-RTO: 0 /100 WBCS (ref 0–0)
PARAINFLUENZA PCR, VIRC: NOT DETECTED
PLATELET # BLD AUTO: 501 X10*3/UL (ref 150–450)
POTASSIUM SERPL-SCNC: 4.3 MMOL/L (ref 3.5–5.3)
RBC # BLD AUTO: 3.9 X10*6/UL (ref 4.5–5.9)
RSV PCR, RVP, VIRC: NOT DETECTED
SODIUM SERPL-SCNC: 128 MMOL/L (ref 136–145)
WBC # BLD AUTO: 16.1 X10*3/UL (ref 4.4–11.3)

## 2025-07-18 PROCEDURE — 99233 SBSQ HOSP IP/OBS HIGH 50: CPT

## 2025-07-18 PROCEDURE — 2500000001 HC RX 250 WO HCPCS SELF ADMINISTERED DRUGS (ALT 637 FOR MEDICARE OP)

## 2025-07-18 PROCEDURE — 94640 AIRWAY INHALATION TREATMENT: CPT

## 2025-07-18 PROCEDURE — 97166 OT EVAL MOD COMPLEX 45 MIN: CPT | Mod: GO

## 2025-07-18 PROCEDURE — 83735 ASSAY OF MAGNESIUM: CPT

## 2025-07-18 PROCEDURE — 2500000002 HC RX 250 W HCPCS SELF ADMINISTERED DRUGS (ALT 637 FOR MEDICARE OP, ALT 636 FOR OP/ED)

## 2025-07-18 PROCEDURE — 82947 ASSAY GLUCOSE BLOOD QUANT: CPT

## 2025-07-18 PROCEDURE — 2500000005 HC RX 250 GENERAL PHARMACY W/O HCPCS: Performed by: STUDENT IN AN ORGANIZED HEALTH CARE EDUCATION/TRAINING PROGRAM

## 2025-07-18 PROCEDURE — 2500000002 HC RX 250 W HCPCS SELF ADMINISTERED DRUGS (ALT 637 FOR MEDICARE OP, ALT 636 FOR OP/ED): Performed by: STUDENT IN AN ORGANIZED HEALTH CARE EDUCATION/TRAINING PROGRAM

## 2025-07-18 PROCEDURE — 97161 PT EVAL LOW COMPLEX 20 MIN: CPT | Mod: GP

## 2025-07-18 PROCEDURE — 85027 COMPLETE CBC AUTOMATED: CPT

## 2025-07-18 PROCEDURE — 80048 BASIC METABOLIC PNL TOTAL CA: CPT

## 2025-07-18 PROCEDURE — 1200000002 HC GENERAL ROOM WITH TELEMETRY DAILY

## 2025-07-18 PROCEDURE — 36415 COLL VENOUS BLD VENIPUNCTURE: CPT

## 2025-07-18 PROCEDURE — 2500000001 HC RX 250 WO HCPCS SELF ADMINISTERED DRUGS (ALT 637 FOR MEDICARE OP): Performed by: STUDENT IN AN ORGANIZED HEALTH CARE EDUCATION/TRAINING PROGRAM

## 2025-07-18 RX ORDER — HYDROCORTISONE 5 MG/1
25 TABLET ORAL EVERY 12 HOURS SCHEDULED
Status: DISCONTINUED | OUTPATIENT
Start: 2025-07-18 | End: 2025-07-19 | Stop reason: HOSPADM

## 2025-07-18 RX ADMIN — SODIUM CHLORIDE 1 G: 1 TABLET ORAL at 08:42

## 2025-07-18 RX ADMIN — SODIUM CHLORIDE 1 G: 1 TABLET ORAL at 12:18

## 2025-07-18 RX ADMIN — IPRATROPIUM BROMIDE AND ALBUTEROL SULFATE 3 ML: 2.5; .5 SOLUTION RESPIRATORY (INHALATION) at 08:06

## 2025-07-18 RX ADMIN — Medication 2 L/MIN: at 13:38

## 2025-07-18 RX ADMIN — HYDROCODONE BITARTRATE AND HOMATROPINE METHYLBROMIDE 5 ML: 1.5; 5 SOLUTION ORAL at 08:43

## 2025-07-18 RX ADMIN — INSULIN LISPRO 1 UNITS: 100 INJECTION, SOLUTION INTRAVENOUS; SUBCUTANEOUS at 12:18

## 2025-07-18 RX ADMIN — ATORVASTATIN CALCIUM 40 MG: 40 TABLET, FILM COATED ORAL at 08:43

## 2025-07-18 RX ADMIN — CARBAMIDE PEROXIDE 5 DROP: 65 SOLUTION/ DROPS TOPICAL at 21:18

## 2025-07-18 RX ADMIN — APIXABAN 10 MG: 5 TABLET, FILM COATED ORAL at 08:42

## 2025-07-18 RX ADMIN — BUDESONIDE 0.25 MG: 0.25 INHALANT RESPIRATORY (INHALATION) at 20:19

## 2025-07-18 RX ADMIN — IPRATROPIUM BROMIDE AND ALBUTEROL SULFATE 3 ML: 2.5; .5 SOLUTION RESPIRATORY (INHALATION) at 13:38

## 2025-07-18 RX ADMIN — Medication 2 L/MIN: at 08:06

## 2025-07-18 RX ADMIN — HYDROCORTISONE 25 MG: 5 TABLET ORAL at 21:17

## 2025-07-18 RX ADMIN — FLUTICASONE PROPIONATE 1 SPRAY: 50 SPRAY, METERED NASAL at 08:42

## 2025-07-18 RX ADMIN — HYDROCORTISONE 25 MG: 5 TABLET ORAL at 14:20

## 2025-07-18 RX ADMIN — Medication 2 L/MIN: at 08:47

## 2025-07-18 RX ADMIN — IPRATROPIUM BROMIDE AND ALBUTEROL SULFATE 3 ML: 2.5; .5 SOLUTION RESPIRATORY (INHALATION) at 20:19

## 2025-07-18 RX ADMIN — BUDESONIDE 0.25 MG: 0.25 INHALANT RESPIRATORY (INHALATION) at 08:06

## 2025-07-18 RX ADMIN — HYDROCORTISONE 25 MG: 5 TABLET ORAL at 05:40

## 2025-07-18 RX ADMIN — LEVOTHYROXINE SODIUM 50 MCG: 0.05 TABLET ORAL at 05:40

## 2025-07-18 RX ADMIN — SODIUM CHLORIDE 1 G: 1 TABLET ORAL at 17:31

## 2025-07-18 RX ADMIN — APIXABAN 10 MG: 5 TABLET, FILM COATED ORAL at 21:17

## 2025-07-18 ASSESSMENT — PAIN SCALES - GENERAL
PAINLEVEL_OUTOF10: 0 - NO PAIN
PAINLEVEL_OUTOF10: 0 - NO PAIN

## 2025-07-18 ASSESSMENT — COGNITIVE AND FUNCTIONAL STATUS - GENERAL
MOBILITY SCORE: 16
WALKING IN HOSPITAL ROOM: A LITTLE
TURNING FROM BACK TO SIDE WHILE IN FLAT BAD: A LITTLE
TURNING FROM BACK TO SIDE WHILE IN FLAT BAD: A LITTLE
STANDING UP FROM CHAIR USING ARMS: A LITTLE
CLIMB 3 TO 5 STEPS WITH RAILING: TOTAL
STANDING UP FROM CHAIR USING ARMS: A LITTLE
HELP NEEDED FOR BATHING: A LITTLE
TOILETING: A LITTLE
EATING MEALS: A LITTLE
DRESSING REGULAR LOWER BODY CLOTHING: A LITTLE
DAILY ACTIVITIY SCORE: 18
MOVING FROM LYING ON BACK TO SITTING ON SIDE OF FLAT BED WITH BEDRAILS: A LITTLE
MOVING TO AND FROM BED TO CHAIR: A LITTLE
MOVING TO AND FROM BED TO CHAIR: A LITTLE
WALKING IN HOSPITAL ROOM: A LITTLE
PERSONAL GROOMING: A LITTLE
MOBILITY SCORE: 19
CLIMB 3 TO 5 STEPS WITH RAILING: A LITTLE
DAILY ACTIVITIY SCORE: 22
DRESSING REGULAR LOWER BODY CLOTHING: A LITTLE
DRESSING REGULAR UPPER BODY CLOTHING: A LITTLE
TOILETING: A LITTLE

## 2025-07-18 ASSESSMENT — PAIN - FUNCTIONAL ASSESSMENT
PAIN_FUNCTIONAL_ASSESSMENT: 0-10

## 2025-07-18 ASSESSMENT — ACTIVITIES OF DAILY LIVING (ADL)
BATHING_ASSISTANCE: MINIMAL
ADL_ASSISTANCE: INDEPENDENT

## 2025-07-18 NOTE — PROGRESS NOTES
Adrian Rosa is a 69 y.o. male on day 3 of admission presenting with Pneumonia of both lungs due to infectious organism, unspecified part of lung.      Subjective   Still has DUTTON       Objective          Vitals 24HR  Heart Rate:  [56-66]   Temp:  [35.8 °C (96.4 °F)-36.4 °C (97.5 °F)]   Resp:  [16]   BP: (115-141)/(66-76)   SpO2:  [94 %-99 %]         Intake/Output last 3 Shifts:    Intake/Output Summary (Last 24 hours) at 7/18/2025 1236  Last data filed at 7/18/2025 0543  Gross per 24 hour   Intake 775 ml   Output 520 ml   Net 255 ml       NAD  CTA anteriorly  No LE edema    Results for orders placed or performed during the hospital encounter of 07/15/25 (from the past 96 hours)   CBC with Differential   Result Value Ref Range    WBC 16.7 (H) 4.4 - 11.3 x10*3/uL    nRBC 0.0 0.0 - 0.0 /100 WBCs    RBC 4.72 4.50 - 5.90 x10*6/uL    Hemoglobin 14.7 13.5 - 17.5 g/dL    Hematocrit 42.7 41.0 - 52.0 %    MCV 91 80 - 100 fL    MCH 31.1 26.0 - 34.0 pg    MCHC 34.4 32.0 - 36.0 g/dL    RDW 12.4 11.5 - 14.5 %    Platelets 508 (H) 150 - 450 x10*3/uL    Neutrophils % 60.0 40.0 - 80.0 %    Immature Granulocytes %, Automated 0.8 0.0 - 0.9 %    Lymphocytes % 26.5 13.0 - 44.0 %    Monocytes % 10.8 2.0 - 10.0 %    Eosinophils % 1.7 0.0 - 6.0 %    Basophils % 0.2 0.0 - 2.0 %    Neutrophils Absolute 10.03 (H) 1.20 - 7.70 x10*3/uL    Immature Granulocytes Absolute, Automated 0.13 0.00 - 0.70 x10*3/uL    Lymphocytes Absolute 4.43 1.20 - 4.80 x10*3/uL    Monocytes Absolute 1.81 (H) 0.10 - 1.00 x10*3/uL    Eosinophils Absolute 0.29 0.00 - 0.70 x10*3/uL    Basophils Absolute 0.04 0.00 - 0.10 x10*3/uL   Comprehensive Metabolic Panel   Result Value Ref Range    Glucose 88 74 - 99 mg/dL    Sodium 126 (L) 136 - 145 mmol/L    Potassium 4.8 3.5 - 5.3 mmol/L    Chloride 95 (L) 98 - 107 mmol/L    Bicarbonate 23 21 - 32 mmol/L    Anion Gap 13 10 - 20 mmol/L    Urea Nitrogen 23 6 - 23 mg/dL    Creatinine 1.27 0.50 - 1.30 mg/dL    eGFR 61 >60  mL/min/1.73m*2    Calcium 9.0 8.6 - 10.3 mg/dL    Albumin 3.5 3.4 - 5.0 g/dL    Alkaline Phosphatase 62 33 - 136 U/L    Total Protein 8.6 (H) 6.4 - 8.2 g/dL    AST 32 9 - 39 U/L    Bilirubin, Total 0.6 0.0 - 1.2 mg/dL    ALT 37 10 - 52 U/L   Brain Natriuretic Peptide   Result Value Ref Range    BNP 11 0 - 99 pg/mL   Protime-INR   Result Value Ref Range    Protime 14.6 (H) 9.8 - 12.4 seconds    INR 1.3 (H) 0.9 - 1.1   Troponin I, High Sensitivity, Initial   Result Value Ref Range    Troponin I, High Sensitivity 6 0 - 20 ng/L   ECG 12 lead   Result Value Ref Range    Ventricular Rate 67 BPM    Atrial Rate 67 BPM    ME Interval 158 ms    QRS Duration 84 ms    QT Interval 402 ms    QTC Calculation(Bazett) 424 ms    P Axis 42 degrees    R Axis 9 degrees    T Axis 41 degrees    QRS Count 11 beats    Q Onset 213 ms    P Onset 134 ms    P Offset 191 ms    T Offset 414 ms    QTC Fredericia 417 ms   Troponin, High Sensitivity, 1 Hour   Result Value Ref Range    Troponin I, High Sensitivity 5 0 - 20 ng/L   ECG 12 lead   Result Value Ref Range    Ventricular Rate 61 BPM    Atrial Rate 61 BPM    ME Interval 166 ms    QRS Duration 82 ms    QT Interval 414 ms    QTC Calculation(Bazett) 416 ms    P Axis 36 degrees    R Axis 109 degrees    T Axis -13 degrees    QRS Count 10 beats    Q Onset 222 ms    P Onset 139 ms    P Offset 199 ms    T Offset 429 ms    QTC Fredericia 416 ms   Blood Gas Arterial Full Panel   Result Value Ref Range    POCT pH, Arterial 7.47 (H) 7.38 - 7.42 pH    POCT pCO2, Arterial 27 (L) 38 - 42 mm Hg    POCT pO2, Arterial 74 (L) 85 - 95 mm Hg    POCT SO2, Arterial 96 94 - 100 %    POCT Oxy Hemoglobin, Arterial 94.3 94.0 - 98.0 %    POCT Hematocrit Calculated, Arterial 43.0 41.0 - 52.0 %    POCT Sodium, Arterial 124 (L) 136 - 145 mmol/L    POCT Potassium, Arterial 4.4 3.5 - 5.3 mmol/L    POCT Chloride, Arterial 99 98 - 107 mmol/L    POCT Ionized Calcium, Arterial 1.12 1.10 - 1.33 mmol/L    POCT Glucose, Arterial  105 (H) 74 - 99 mg/dL    POCT Lactate, Arterial 0.7 0.4 - 2.0 mmol/L    POCT Base Excess, Arterial -2.5 (L) -2.0 - 3.0 mmol/L    POCT HCO3 Calculated, Arterial 19.7 (L) 22.0 - 26.0 mmol/L    POCT Hemoglobin, Arterial 14.3 13.5 - 17.5 g/dL    POCT Anion Gap, Arterial 10 10 - 25 mmo/L    Patient Temperature      FiO2 50 %    Apparatus AEROSOL MASK     Flow 8.0 LPM    Site of Arterial Puncture LBA     Francisco's Test N/A    Legionella Antigen, Urine    Specimen: Clean Catch/Voided; Urine   Result Value Ref Range    L. pneumophila Urine Ag Negative Negative   Streptococcus pneumoniae Antigen, Urine    Specimen: Clean Catch/Voided; Urine   Result Value Ref Range    Streptococcus pneumoniae Ag, Urine Negative Negative   MRSA Surveillance for Vancomycin De-escalation, PCR    Specimen: Anterior Nares; Swab   Result Value Ref Range    MRSA PCR Not Detected Not Detected   Sars-CoV-2, Influenza A/B and RSV PCR   Result Value Ref Range    Coronavirus 2019, PCR Not Detected Not Detected    Flu A Result Not Detected Not Detected    Flu B Result Not Detected Not Detected    RSV PCR Not Detected Not Detected   Blood Culture    Specimen: Peripheral Venipuncture; Blood culture   Result Value Ref Range    Blood Culture No growth at 2 days    Procalcitonin   Result Value Ref Range    Procalcitonin 0.09 (H) <=0.07 ng/mL   Blood Culture    Specimen: Peripheral Venipuncture; Blood culture   Result Value Ref Range    Blood Culture No growth at 2 days    Light Blue Top   Result Value Ref Range    Extra Tube Hold for add-ons.    Lavender Top   Result Value Ref Range    Extra Tube Hold for add-ons.    PST Top   Result Value Ref Range    Extra Tube Hold for add-ons.    B-Type Natriuretic Peptide   Result Value Ref Range    BNP 12 0 - 99 pg/mL   Troponin I, High Sensitivity   Result Value Ref Range    Troponin I, High Sensitivity 7 0 - 20 ng/L   Heparin Assay, UFH   Result Value Ref Range    Heparin Unfractionated 0.2 See Comment Below for  Therapeutic Ranges IU/mL   Basic metabolic panel   Result Value Ref Range    Glucose 122 (H) 74 - 99 mg/dL    Sodium 124 (L) 136 - 145 mmol/L    Potassium 4.8 3.5 - 5.3 mmol/L    Chloride 94 (L) 98 - 107 mmol/L    Bicarbonate 21 21 - 32 mmol/L    Anion Gap 14 10 - 20 mmol/L    Urea Nitrogen 18 6 - 23 mg/dL    Creatinine 1.18 0.50 - 1.30 mg/dL    eGFR 66 >60 mL/min/1.73m*2    Calcium 8.5 (L) 8.6 - 10.3 mg/dL   CBC   Result Value Ref Range    WBC 19.3 (H) 4.4 - 11.3 x10*3/uL    nRBC 0.0 0.0 - 0.0 /100 WBCs    RBC 4.36 (L) 4.50 - 5.90 x10*6/uL    Hemoglobin 13.6 13.5 - 17.5 g/dL    Hematocrit 39.7 (L) 41.0 - 52.0 %    MCV 91 80 - 100 fL    MCH 31.2 26.0 - 34.0 pg    MCHC 34.3 32.0 - 36.0 g/dL    RDW 12.3 11.5 - 14.5 %    Platelets 487 (H) 150 - 450 x10*3/uL   Magnesium   Result Value Ref Range    Magnesium 1.89 1.60 - 2.40 mg/dL   Troponin I, High Sensitivity   Result Value Ref Range    Troponin I, High Sensitivity 8 0 - 20 ng/L   Heparin Assay, UFH   Result Value Ref Range    Heparin Unfractionated 1.3 () See Comment Below for Therapeutic Ranges IU/mL   SST TOP   Result Value Ref Range    Extra Tube Hold for add-ons.    Heparin Assay, UFH   Result Value Ref Range    Heparin Unfractionated 0.8 See Comment Below for Therapeutic Ranges IU/mL   Transthoracic Echo Complete   Result Value Ref Range    AV pk reema 1.22 m/s    LV Biplane EF 68 %    LVOT diam 2.01 cm    MV E/A ratio 0.87     Tricuspid annular plane systolic excursion 2.1 cm    LA vol index A/L 16.6 ml/m2    LV EF 63 %    RV free wall pk S' 19.36 cm/s    RVSP 36 mmHg    LVIDd 4.47 cm    Aortic Valve Area by Continuity of Peak Velocity 2.59 cm2    AV pk grad 6 mmHg    LV A4C EF 64.5    Basic metabolic panel   Result Value Ref Range    Glucose 174 (H) 74 - 99 mg/dL    Sodium 124 (L) 136 - 145 mmol/L    Potassium 4.2 3.5 - 5.3 mmol/L    Chloride 95 (L) 98 - 107 mmol/L    Bicarbonate 23 21 - 32 mmol/L    Anion Gap 10 10 - 20 mmol/L    Urea Nitrogen 21 6 - 23 mg/dL     Creatinine 1.10 0.50 - 1.30 mg/dL    eGFR 72 >60 mL/min/1.73m*2    Calcium 8.0 (L) 8.6 - 10.3 mg/dL   Heparin Assay, UFH   Result Value Ref Range    Heparin Unfractionated 0.5 See Comment Below for Therapeutic Ranges IU/mL   POCT GLUCOSE   Result Value Ref Range    POCT Glucose 149 (H) 74 - 99 mg/dL   Urine electrolytes   Result Value Ref Range    Sodium, Urine Random 55 mmol/L    Sodium/Creatinine Ratio 34 Not established. mmol/g Creat    Potassium, Urine Random 72 mmol/L    Potassium/Creatinine Ratio 44 Not established mmol/g Creat    Chloride, Urine Random 72 mmol/L    Chloride/Creatinine Ratio 44 23 - 275 mmol/g creat    Creatinine, Urine Random 162.1 20.0 - 370.0 mg/dL   Osmolality, urine   Result Value Ref Range    Osmolality, Urine Random 759 200 - 1,200 mOsm/kg   POCT GLUCOSE   Result Value Ref Range    POCT Glucose 180 (H) 74 - 99 mg/dL   Basic metabolic panel   Result Value Ref Range    Glucose 119 (H) 74 - 99 mg/dL    Sodium 126 (L) 136 - 145 mmol/L    Potassium 4.1 3.5 - 5.3 mmol/L    Chloride 97 (L) 98 - 107 mmol/L    Bicarbonate 21 21 - 32 mmol/L    Anion Gap 12 10 - 20 mmol/L    Urea Nitrogen 19 6 - 23 mg/dL    Creatinine 0.96 0.50 - 1.30 mg/dL    eGFR 85 >60 mL/min/1.73m*2    Calcium 8.3 (L) 8.6 - 10.3 mg/dL   CBC   Result Value Ref Range    WBC 17.0 (H) 4.4 - 11.3 x10*3/uL    nRBC 0.0 0.0 - 0.0 /100 WBCs    RBC 3.98 (L) 4.50 - 5.90 x10*6/uL    Hemoglobin 12.3 (L) 13.5 - 17.5 g/dL    Hematocrit 34.9 (L) 41.0 - 52.0 %    MCV 88 80 - 100 fL    MCH 30.9 26.0 - 34.0 pg    MCHC 35.2 32.0 - 36.0 g/dL    RDW 12.1 11.5 - 14.5 %    Platelets 489 (H) 150 - 450 x10*3/uL   Magnesium   Result Value Ref Range    Magnesium 2.00 1.60 - 2.40 mg/dL   POCT GLUCOSE   Result Value Ref Range    POCT Glucose 132 (H) 74 - 99 mg/dL   POCT GLUCOSE   Result Value Ref Range    POCT Glucose 126 (H) 74 - 99 mg/dL   POCT GLUCOSE   Result Value Ref Range    POCT Glucose 197 (H) 74 - 99 mg/dL   POCT GLUCOSE   Result Value Ref  Range    POCT Glucose 133 (H) 74 - 99 mg/dL   Basic metabolic panel   Result Value Ref Range    Glucose 118 (H) 74 - 99 mg/dL    Sodium 128 (L) 136 - 145 mmol/L    Potassium 4.3 3.5 - 5.3 mmol/L    Chloride 99 98 - 107 mmol/L    Bicarbonate 22 21 - 32 mmol/L    Anion Gap 11 10 - 20 mmol/L    Urea Nitrogen 23 6 - 23 mg/dL    Creatinine 1.00 0.50 - 1.30 mg/dL    eGFR 81 >60 mL/min/1.73m*2    Calcium 8.5 (L) 8.6 - 10.3 mg/dL   CBC   Result Value Ref Range    WBC 16.1 (H) 4.4 - 11.3 x10*3/uL    nRBC 0.0 0.0 - 0.0 /100 WBCs    RBC 3.90 (L) 4.50 - 5.90 x10*6/uL    Hemoglobin 12.2 (L) 13.5 - 17.5 g/dL    Hematocrit 35.3 (L) 41.0 - 52.0 %    MCV 91 80 - 100 fL    MCH 31.3 26.0 - 34.0 pg    MCHC 34.6 32.0 - 36.0 g/dL    RDW 12.4 11.5 - 14.5 %    Platelets 501 (H) 150 - 450 x10*3/uL   Magnesium   Result Value Ref Range    Magnesium 1.99 1.60 - 2.40 mg/dL   POCT GLUCOSE   Result Value Ref Range    POCT Glucose 126 (H) 74 - 99 mg/dL   POCT GLUCOSE   Result Value Ref Range    POCT Glucose 199 (H) 74 - 99 mg/dL     Scheduled medications  Scheduled Medications[1]  Continuous medications  Continuous Medications[2]  PRN medications  PRN Medications[3]                    Assessment & Plan  Pneumonia of both lungs due to infectious organism, unspecified part of lung    [place]'s disease (Multi)    Bilateral pulmonary embolism (Multi)    Acute hyponatremia    Acute deep vein thrombosis (DVT) of left peroneal vein (Multi)    Hyponatremia  He has a euvolemic hyponatremia     None of his medications cause hyponatremia     Consider a longer course of stress dose steroids due to his recent health problems     His urine osmolality is 759 and this should be <200 in the setting of hyponatremia.  If his Addisons is  not causing his hyponatremia, then it is SIADH.       Continue salt tabs 1 gram tid for another day.  His Sodium has improved to 128  Continue protein supplement  Continue fluid restriction               Tyler Watson MD            [1] apixaban, 10 mg, oral, BID   Followed by  [START ON 7/23/2025] apixaban, 5 mg, oral, BID  atorvastatin, 40 mg, oral, Daily  budesonide, 0.25 mg, nebulization, BID  fluticasone, 1 spray, Each Nostril, Daily  hydrocortisone, 25 mg, oral, q8h EILEEN  insulin lispro, 0-5 Units, subcutaneous, TID AC  ipratropium-albuteroL, 3 mL, nebulization, TID  levothyroxine, 50 mcg, oral, Daily  perflutren protein A microsphere, 0.5 mL, intravenous, Once in imaging  polyethylene glycol, 17 g, oral, Daily  [Held by provider] predniSONE, 5 mg, oral, Daily  sodium chloride, 1,000 mg, oral, TID  [2]    [3] PRN medications: acetaminophen, albuterol, hydrocodone-homatropine, ondansetron, oxyCODONE, oxygen

## 2025-07-18 NOTE — PROGRESS NOTES
Adrian Rosa is a 69 y.o. male on day 3 of admission presenting with Pneumonia of both lungs due to infectious organism, unspecified part of lung.      Subjective   NAEO.  Feeling much better than when admitted though endorses right-sided pleuritic chest pain and issues with breathing.    Later in the day when reassessed for tuck in rounding complained of left ear fullness, no pain.     Objective     Last Recorded Vitals  /76   Pulse 60   Temp 36 °C (96.8 °F) (Temporal)   Resp 16   Wt 92.4 kg (203 lb 11.3 oz)   SpO2 93%   Intake/Output last 3 Shifts:    Intake/Output Summary (Last 24 hours) at 7/18/2025 1645  Last data filed at 7/18/2025 0543  Gross per 24 hour   Intake 775 ml   Output 520 ml   Net 255 ml       Admission Weight  Weight: 94.3 kg (208 lb) (07/15/25 1500)    Daily Weight  07/15/25 : 92.4 kg (203 lb 11.3 oz)    Image Results  ECG 12 lead  Normal sinus rhythm  Rightward axis  ST & T wave abnormality, consider inferior ischemia  Abnormal ECG  When compared with ECG of 15-JUL-2025 15:07, (unconfirmed)  QRS axis Shifted right  T wave inversion now evident in Inferior leads  T wave amplitude has increased in Lateral leads  Confirmed by Berhane Morel ([id number]) on 7/17/2025 3:51:48 PM  ECG 12 lead  Normal sinus rhythm  Normal ECG  When compared with ECG of 08-JUL-2025 09:30,  Criteria for Septal infarct are no longer Present  Confirmed by Berhane Morel ([id number]) on 7/17/2025 3:45:08 PM  XR chest 1 view  Narrative: Interpreted By:  Vaughn Flowers,   STUDY:  XR CHEST 1 VIEW;  7/17/2025 12:04 pm      INDICATION:  Signs/Symptoms:Worsening cough.      COMPARISON:  07/15/2025      ACCESSION NUMBER(S):  [id number]      ORDERING CLINICIAN:  TORREY ESTRADA      FINDINGS:  Right upper lobe consolidation unchanged. Left basilar opacities have  increased. No apparent pleural effusion. Cardiomediastinal silhouette  unchanged. No pulmonary vascular congestion.      Impression: Unchanged right upper lobe  consolidation.  Increasing left basilar consolidation.      MACRO:  None      Signed by: Vaughn Flowers 7/17/2025 1:09 PM  Dictation workstation:   [id number]  Transthoracic Echo Complete              [facility] [place] [facility]  [id number], [place] [id number]     Tel [phone] Fax [phone]    TRANSTHORACIC ECHOCARDIOGRAM REPORT    Patient Name:       LYNDSEY BERMUDEZ        Reading Physician:    [id number] Rajeev Wong MD  Study Date:         7/16/2025            Ordering Provider:    [id number] TAMEKA ZURITA  MRN/PID:            [id number]             Fellow:  Accession#:         [id number]         Nurse:  Date of Birth/Age:  1955 / 69      Sonographer:          Sallie Alanis RDCS                      years  Gender Assigned at                      Additional Staff:  Birth:  Height:             170.18 cm            Admit Date:           7/15/2025  Weight:             92.08 kg             Admission Status:     Inpatient -                                                                 Routine  BSA / BMI:          2.03 m2 / 31.79      Department Location:  [facility]                      kg/m2  Blood Pressure: 136 /79 mmHg    Study Type:    TRANSTHORACIC ECHO (TTE) COMPLETE  Diagnosis/ICD: Multiple subsegmental pulmonary emboli without acute cor                 pulmonale-I26.94  Indication:    Pulmonary embolism  CPT Codes:     Echo Complete w Full Doppler-51405    Patient History:  Pertinent History: Dyspnea and Chest Pain. LUIS FERNANDO; no previous echocardiogram.    Study Detail: The following Echo studies were performed: 2D, M-Mode, Doppler and                color flow. Technically challenging study due to body habitus and                patient lying in supine position. Definity used as a contrast                agent for endocardial border definition. Total contrast used for                 this procedure was 3 mL via IV push. A bubble study was not                performed.       PHYSICIAN INTERPRETATION:  Left Ventricle: The left ventricular systolic function is normal with a visually estimated ejection fraction of 60-65%. There are no regional wall motion abnormalities. The left ventricular cavity size is normal. There is normal septal and normal posterior left ventricular wall thickness. Spectral Doppler shows a Grade I (impaired relaxation pattern) of left ventricular diastolic filling with normal left atrial filling pressure.  Left Atrium: The left atrial size is normal. A bubble study using agitated saline was not performed.  Right Ventricle: The right ventricle is normal in size. There is normal right ventricular global systolic function. Normal TAPSE.  Right Atrium: The right atrial size is normal.  Aortic Valve: The aortic valve is trileaflet. There is minimal aortic valve cusp calcification. There is no evidence of aortic valve regurgitation.  Mitral Valve: The mitral valve is normal in structure. The doppler estimated peak and mean diastolic gradients are 3 mmHg and 1 mmHg, respectively. There is no evidence of mitral valve regurgitation. The E Vmax is 0.63 m/s.  Tricuspid Valve: The tricuspid valve is structurally normal. There is trace tricuspid regurgitation. The Doppler estimated right ventricular systolic pressure (RVSP) is mildly elevated at 36 mmHg.  Pulmonic Valve: The pulmonic valve is structurally normal. There is trace pulmonic valve regurgitation.  Pericardium: No pericardial effusion noted.  Aorta: The aortic root is normal.  Systemic Veins: The inferior vena cava was not well visualized, IVC inspiratory collapse is not well visualized.       CONCLUSIONS:   1. The left ventricular systolic function is normal with a visually estimated ejection fraction of 60-65%.   2. Spectral Doppler shows a Grade I (impaired relaxation pattern) of left ventricular diastolic filling with  normal left atrial filling pressure.   3. There is normal right ventricular global systolic function.   4. Normal TAPSE.   5. The Doppler estimated RVSP is mildly elevated at 36 mmHg.    QUANTITATIVE DATA SUMMARY:     2D MEASUREMENTS:             Normal Ranges:  LAs:             3.59 cm     (2.7-4.0cm)  IVSd:            0.78 cm     (0.6-1.1cm)  LVPWd:           0.90 cm     (0.6-1.1cm)  LVIDd:           4.47 cm     (3.9-5.9cm)  LVIDs:           2.62 cm  LV Mass Index:   58.8 g/m2  LVEDV Index:     31.89 ml/m2  LV % FS          41.5 %       LEFT ATRIUM:                  Normal Ranges:  LA Vol A4C:        30.2 ml    (22+/-6mL/m2)  LA Vol A2C:        34.0 ml  LA Vol BP:         33.8 ml  LA Vol Index A4C:  14.8ml/m2  LA Vol Index A2C:  16.7 ml/m2  LA Vol Index BP:   16.6 ml/m2  LA Area A4C:       14.6 cm2  LA Area A2C:       14.7 cm2  LA Major Axis A4C: 6.0 cm  LA Major Axis A2C: 5.4 cm  LA Volume Index:   16.6 ml/m2  LA Vol A4C:        29.6 ml  LA Vol A2C:        31.9 ml  LA Vol Index BSA:  15.1 ml/m2       AORTA MEASUREMENTS:         Normal Ranges:  Ao Sinus, d:        3.50 cm (2.1-3.5cm)  Ao STJ, d:          2.80 cm (1.7-3.4cm)  Asc Ao, d:          2.80 cm (2.1-3.4cm)       LV SYSTOLIC FUNCTION:                       Normal Ranges:  EF-A4C View:    64 % (>=55%)  EF-A2C View:    68 %  EF-Biplane:     68 %  EF-Visual:      63 %  LV EF Reported: 63 %       LV DIASTOLIC FUNCTION:           Normal Ranges:  MV Peak E:             0.63 m/s  (0.7-1.2 m/s)  MV Peak A:             0.73 m/s  (0.42-0.7 m/s)  E/A Ratio:             0.87      (1.0-2.2)  MV e'                  0.082 m/s (>8.0)  MV lateral e'          0.10 m/s  MV medial e'           0.06 m/s  E/e' Ratio:            7.71      (<8.0)       MITRAL VALVE:          Normal Ranges:  MV Vmax:      0.91 m/s (<=1.3m/s)  MV peak PG:   3.3 mmHg (<5mmHg)  MV mean P.0 mmHg (<48mmHg)  MV VTI:       30.98 cm (10-13cm)  MV DT:        344 msec (150-240msec)       AORTIC VALVE:            Normal Ranges:  AoV Vmax:      1.22 m/s (<=1.7m/s)  AoV Peak P.0 mmHg (<20mmHg)  LVOT Max Dawood:  0.99 m/s (<=1.1m/s)  LVOT VTI:      19.55 cm  LVOT Diameter: 2.01 cm  (1.8-2.4cm)  AoV Area,Vmax: 2.59 cm2 (2.5-4.5cm2)       RIGHT VENTRICLE:  RV Basal 3.67 cm  RV Mid   3.60 cm  RV Major 7.2 cm  TAPSE:   21.3 mm  RV s'    0.19 m/s       TRICUSPID VALVE/RVSP:          Normal Ranges:  Peak TR Velocity:     2.89 m/s  Est. RA Pressure:     3 mmHg  RV Syst Pressure:     36 mmHg  (< 30mmHg)       PULMONIC VALVE:          Normal Ranges:  PV Accel Time:  103 msec (>120ms)  PV Max Dawood:     0.9 m/s  (0.6-0.9m/s)  PV Max PG:      3.5 mmHg       AORTA:  Asc Ao Diam 2.80 cm       [id number] Rajeev Wong MD  Electronically signed on 2025 at 10:58:11 AM       ** Final **      Physical Exam  GENERAL: Alert and oriented x 3. Well-nourished.  EYES: Anicteric.  HENT: Moist mucous membranes. No scleral icterus. L tympanic membrane pearly gray without erythema or effusion.  LUNGS: CTA BL. No accessory muscle use.  CV: RRR. No murmur. No JVD.  ABDOMEN: Soft, non-tender and non-distended. No palpable masses.   EXTREMITIES: No edema. Non-tender.?  SKIN: No rashes or lesions. Warm.  NEUROLOGIC: No focal neurological deficits.  PSYCHIATRIC: Cooperative. Appropriate mood and affect.    Relevant Results  Results for orders placed or performed during the hospital encounter of 07/15/25 (from the past 24 hours)   POCT GLUCOSE   Result Value Ref Range    POCT Glucose 133 (H) 74 - 99 mg/dL   Basic metabolic panel   Result Value Ref Range    Glucose 118 (H) 74 - 99 mg/dL    Sodium 128 (L) 136 - 145 mmol/L    Potassium 4.3 3.5 - 5.3 mmol/L    Chloride 99 98 - 107 mmol/L    Bicarbonate 22 21 - 32 mmol/L    Anion Gap 11 10 - 20 mmol/L    Urea Nitrogen 23 6 - 23 mg/dL    Creatinine 1.00 0.50 - 1.30 mg/dL    eGFR 81 >60 mL/min/1.73m*2    Calcium 8.5 (L) 8.6 - 10.3 mg/dL   CBC   Result Value Ref Range    WBC 16.1 (H) 4.4 - 11.3 x10*3/uL    nRBC  0.0 0.0 - 0.0 /100 WBCs    RBC 3.90 (L) 4.50 - 5.90 x10*6/uL    Hemoglobin 12.2 (L) 13.5 - 17.5 g/dL    Hematocrit 35.3 (L) 41.0 - 52.0 %    MCV 91 80 - 100 fL    MCH 31.3 26.0 - 34.0 pg    MCHC 34.6 32.0 - 36.0 g/dL    RDW 12.4 11.5 - 14.5 %    Platelets 501 (H) 150 - 450 x10*3/uL   Magnesium   Result Value Ref Range    Magnesium 1.99 1.60 - 2.40 mg/dL   POCT GLUCOSE   Result Value Ref Range    POCT Glucose 126 (H) 74 - 99 mg/dL   POCT GLUCOSE   Result Value Ref Range    POCT Glucose 199 (H) 74 - 99 mg/dL   POCT GLUCOSE   Result Value Ref Range    POCT Glucose 149 (H) 74 - 99 mg/dL                 Assessment & Plan  Pneumonia of both lungs due to infectious organism, unspecified part of lung  Recurrent pneumonia vs pneumonia with treatment failure  MDR risk factors   Acute hypoxic respiratory failure  Hyponatremia   Jama disease   Osgood's disease (Multi)    Bilateral pulmonary embolism (Multi)    Acute hyponatremia    Acute deep vein thrombosis (DVT) of left peroneal vein (Multi)    Mr. Rosa is a 69-year-old man with Osgood's disease on prednisone, dyslipidemia, hypothyroidism admitted for bilateral pulmonary emboli and acute on chronic hyponatremia following recent admission for community-acquired pneumonia.     #bilateral PE  #hypoxia  #recent treatment for CAP  -CT PE with segmental emboli in bilateral lower lobes, right middle lobe and left lower lobe.  Radiographic findings of early right heart strain.  CT also demonstrates bibasilar atelectasis and trace bilateral pleural effusion.  -PESI score 80, indicating class II, low risk PE.  -Troponin and BNP low.  - Given RHS seen on CT, TTE ordered which demonstrated normal EF and RVSP  -DVT US with acute occlusive thrombus in L peroneal vein  -suspect PE provoked in the setting of recent pneumonia/hospitalization   -patient transitioned to apixaban 7/16 He will need AC for at least 3 months for first provoked VTE.  Discussed this extensively at the bedside.   He does have a fear of blood thinners given his family history of father passing from was felt to be saddle PE, likely provoked in setting of COPD/bedbound.  Discussed that he could follow-up with his primary care in regards to discontinuing Eliquis and consideration for repeat imaging to ensure a clot resolving given size.  -patient should follow up with hematology vs PCP   -wean NC as tolerated, today on RA. Ambulatory pulse ox without need for oxygen therapy  -low suspicion recurrent PNA - procal is low. Will discontinue abx     #Jama's disease  -on prednisone 5mg daily which is held  -continue stress dose steroids with hydrocortisone however will taper from 25mg three times a day to twice a day.     #acute on chronic hyponatremia  -noted during prior admission as well  - urine sodium elevated 55, urine osms elevated 759  -nephrology consulted, suspect d/t Jama's disease vs SIADH.   -continue stress dose steroids as above  -fluid restriction 1200cc/day  -salt tabs 1g TID      #preDM  -HbA1c 5.8%  -SSI while on steroids     Misc: continue home statin, flonase, levothyroxine, PRN debrox for L ear fullness     IVF: not indicated  Tele: indicated  Consults: nephro  FEN/GI: regular diet, protein supplements  DVT ppx: eliquis, SCDs  Access: PIV   Code status: FULL    Dispo: Likely tomorrow to home without needs    Plan of care discussed with patient at the bedside.  No family arrived to the bedside throughout the day and was not available via phone.  Did leave voicemail for his brother Buddy    Complexity: high Reta Gallegos DO

## 2025-07-18 NOTE — PROGRESS NOTES
Occupational Therapy    Evaluation    Patient Name: Adrian Rosa  MRN: [id number]  Department: [facility] 3 N  Room: [facility]302-A  Today's Date: 7/18/2025       Assessment  IP OT Assessment  OT Assessment: Patient would benefit from additional skilled rehab at a low intensity to fully maximize independence in adls, activity tolerance for adls, and functional mobility tasks for adls.  Prognosis: Good  Evaluation/Treatment Tolerance: Patient tolerated treatment well  End of Session Communication: Bedside nurse  End of Session Patient Position:  (Sitting eob at end.)  Plan:  Treatment Interventions: ADL retraining, Functional transfer training  OT Frequency: 3 times per week  OT Discharge Recommendations: Low intensity level of continued care  OT - OK to Discharge: Yes (to next level of care when cleared by medical team.)    Subjective   Current Problem:  1. Pneumonia of both lungs due to infectious organism, unspecified part of lung        2. Shortness of breath  Vascular US lower extremity venous duplex bilateral    Transthoracic Echo Complete    Transthoracic Echo Complete      3. Hyponatremia        4. Multiple subsegmental pulmonary emboli without acute cor pulmonale  Vascular US lower extremity venous duplex bilateral    Vascular US lower extremity venous duplex bilateral    Transthoracic Echo Complete    Transthoracic Echo Complete      5. Localized swelling of lower extremity  Vascular US lower extremity venous duplex bilateral    Vascular US lower extremity venous duplex bilateral      6. Other pulmonary embolism without acute cor pulmonale  Vascular US lower extremity venous duplex bilateral        OT Visit Info:  OT Received On: 07/18/25  General Visit Info:  General  Reason for Referral: activities of daily living  Referred By: El  Past Medical History Relevant to Rehab: Admit w/L side pain. PMH: prediabetes, HLD, hypothyroidism, BPH, spinal stenosis, Jama's Disease.  Co-Treatment: PT  Co-Treatment Reason:  transfer safety  Prior to Session Communication: Bedside nurse  Patient Position Received: Bed, 3 rail up  Preferred Learning Style: verbal  General Comment: Agreeable to participate.  Precautions:  Medical Precautions: Fall precautions (Refused bed alarm at end of eval. Nursing made aware. Droplet/Contact/COVID r/o)     Date/Time Vitals Session Patient Position Pulse Resp SpO2 BP MAP (mmHg)    07/18/25 1200 --  --  60  16  97 %  130/76  --           Pain:  Pain Assessment  Pain Assessment: 0-10  0-10 (Numeric) Pain Score: 0 - No pain    Objective   Cognition:  Overall Cognitive Status: Within Functional Limits           Home Living:  Type of Home: House  Lives With: Alone  Home Living Comments: Lives alone, 2 floor home, 3 step entry, commode only on first floor, 13 steps up to 2nd floor bedroom and tub shower with no dme.   Prior Function:  Prior Function Comments: Per patient, pta was independent with all adls, home management, and driving.  IADL History:     ADL:  Eating Assistance: Stand by  Grooming Assistance: Stand by  Bathing Assistance: Minimal  UE Dressing Assistance: Minimal  LE Dressing Assistance: Minimal  Activity Tolerance:  Endurance: Tolerates 10 - 20 min exercise with multiple rests  Bed Mobility/Transfers: Bed Mobility  Bed Mobility: Yes (CGA sup to sit eob. CGA sit to stand. CGA amb w/HHA in room to/from door and return to bed per patient request. CGA stand to sit eob. Patient requests to sit eob at end, refuses bed check, advised to call nurse if getting up. RN made aware of this.)     Sensation:  Light Touch: No apparent deficits  Strength:  Strength Comments: B UE strength 4/5 overall  Perception:     Coordination:  Movements are Fluid and Coordinated: Yes   Hand Function:  Hand Function  Gross Grasp: Functional    Outcome Measures: [facility] Daily Activity  Putting on and taking off regular lower body clothing: A little  Bathing (including washing, rinsing, drying): A little  Putting on and  taking off regular upper body clothing: A little  Toileting, which includes using toilet, bedpan or urinal: A little  Taking care of personal grooming such as brushing teeth: A little  Eating Meals: A little  Daily Activity - Total Score: 18         and    Education Documentation  No documentation found.  Education Comments  No comments found.      Goals:   Encounter Problems       Encounter Problems (Active)       OT Goals       Patient will complete UE adls with MOD I (Progressing)       Start:  07/18/25    Expected End:  08/01/25            Patient will complete LE adls with MOD I (Progressing)       Start:  07/18/25    Expected End:  08/01/25            Patient will complete transfers with MOD I (Progressing)       Start:  07/18/25    Expected End:  08/01/25            Patient will complete functional mobility tasks with MOD I (Progressing)       Start:  07/18/25    Expected End:  08/01/25

## 2025-07-18 NOTE — NURSING NOTE
1415 Ambulatory pulse ox 94% on R/A while ambulating to and from door three times. Oxygen NC discontinued.  1450 Pulse ox 93% on R/A, resps even, unlabored.   EOS note: Resting in bed with alarm on and call light within reach. No decline in assessment. Resps even, unlabored on R/A. Medicated once with hycodan for cough, effective. Injury free throughout shift.

## 2025-07-18 NOTE — ASSESSMENT & PLAN NOTE
Recurrent pneumonia vs pneumonia with treatment failure  MDR risk factors   Acute hypoxic respiratory failure  Hyponatremia   [place] disease

## 2025-07-18 NOTE — CARE PLAN
The patient's goals for the shift include comfort/safety.    Problem: Pain - Adult  Goal: Verbalizes/displays adequate comfort level or baseline comfort level  Outcome: Progressing     Problem: Safety - Adult  Goal: Free from fall injury  Outcome: Progressing     Problem: Discharge Planning  Goal: Discharge to home or other facility with appropriate resources  Outcome: Progressing     Problem: Chronic Conditions and Co-morbidities  Goal: Patient's chronic conditions and co-morbidity symptoms are monitored and maintained or improved  Outcome: Progressing     Problem: Nutrition  Goal: Nutrient intake appropriate for maintaining nutritional needs  Outcome: Progressing     Problem: Fall/Injury  Goal: Not fall by end of shift  Outcome: Progressing  Goal: Be free from injury by end of the shift  Outcome: Progressing  Goal: Verbalize understanding of personal risk factors for fall in the hospital  Outcome: Progressing  Goal: Verbalize understanding of risk factor reduction measures to prevent injury from fall in the home  Outcome: Progressing  Goal: Use assistive devices by end of the shift  Outcome: Progressing  Goal: Pace activities to prevent fatigue by end of the shift  Outcome: Progressing     Problem: Pain  Goal: Takes deep breaths with improved pain control throughout the shift  Outcome: Progressing  Goal: Turns in bed with improved pain control throughout the shift  Outcome: Progressing  Goal: Walks with improved pain control throughout the shift  Outcome: Progressing     Problem: Skin  Goal: Decreased wound size/increased tissue granulation at next dressing change  Outcome: Progressing  Goal: Participates in plan/prevention/treatment measures  Outcome: Progressing  Flowsheets (Taken 7/17/2025 2158)  Participates in plan/prevention/treatment measures: Elevate heels  Goal: Prevent/manage excess moisture  Outcome: Progressing  Goal: Prevent/minimize sheer/friction injuries  Outcome: Progressing  Goal:  Promote/optimize nutrition  Outcome: Progressing  Goal: Promote skin healing  Outcome: Progressing       The clinical goals for the shift include plan of care.    EOS note: Pt remains A&Ox4, supplemental oxygen via NC, 1x assist, continent x2. Pt complained of L shoulder pain of 6/10 this shift. Pain relieved with 5 mg of oxycodone at 2138. No other complaints this shift. Pt resting comfortably in bed, safety maintained and call light within reach.

## 2025-07-18 NOTE — PROGRESS NOTES
Physical Therapy    Physical Therapy Evaluation    Patient Name: Adrian Rosa  MRN: [id number]  Today's Date: 7/18/2025   Time Calculation  Start Time: 0920  Stop Time: 0936  Time Calculation (min): 16 min  3021/3021-A    Assessment/Plan   PT Assessment  PT Assessment Results: Decreased strength, Decreased range of motion, Decreased endurance, Impaired balance, Decreased mobility, Decreased safety awareness, Pain  Rehab Prognosis: Good  Evaluation/Treatment Tolerance: Patient limited by fatigue  Medical Staff Made Aware: Yes  End of Session Communication: Bedside nurse  Assessment Comment: Pt is a 70 y/o male admitted for acute PE/DVT and PNA. Pt presents with chronic pain, decreased strength, endurance and balance. Pt able to tolerate bed mobility, transfers and ambulating in room. He is functioning below baseline level of function and will benefit from skilled therapy during stay to improve overall functional mobility and safety awareness. Upon discharge pt will benefit from low intensity therapy for continued improvement in functional mobility.     End of Session Patient Position: Bed, 3 rail up (Pt seated EOB with call light within reach, pt refusing alarm this date, RN notified and aware)  IP OR SWING BED PT PLAN  Inpatient or Swing Bed: Inpatient  PT Plan  Treatment/Interventions: Bed mobility, Transfer training, Gait training, Stair training, Balance training, Neuromuscular re-education, Endurance training, Strengthening, Range of motion, Therapeutic exercise, Therapeutic activity, Home exercise program, Positioning, Postural re-education  PT Plan: Ongoing PT  PT Frequency: 5 times per week  PT Discharge Recommendations: Low intensity level of continued care  Equipment Recommended upon Discharge: Wheeled walker  PT Recommended Transfer Status: Assist x1, Contact guard  PT - OK to Discharge: Yes (Once medically cleared)    Subjective     Current Problem:  1. Pneumonia of both lungs due to infectious organism,  unspecified part of lung        2. Shortness of breath  Vascular US lower extremity venous duplex bilateral    Transthoracic Echo Complete    Transthoracic Echo Complete      3. Hyponatremia        4. Multiple subsegmental pulmonary emboli without acute cor pulmonale  Vascular US lower extremity venous duplex bilateral    Vascular US lower extremity venous duplex bilateral    Transthoracic Echo Complete    Transthoracic Echo Complete      5. Localized swelling of lower extremity  Vascular US lower extremity venous duplex bilateral    Vascular US lower extremity venous duplex bilateral      6. Other pulmonary embolism without acute cor pulmonale  Vascular US lower extremity venous duplex bilateral        Problem List[1]    General Visit Information:  General  Reason for Referral: P/w SOB. Symptoms worsened over last 2 days with productive cough, malaise, orthopnea described as if he lies flat he feels like he is going to suffocate along with R sided pleuritic chest pain and subjective fevers. CT PE with segmental emboli in bilateral lower lobes, right middle lobe and left lower lobe.  Radiographic findings of early right heart strain.  CT also demonstrates bibasilar atelectasis and trace bilateral pleural effusion. DVT US with acute occlusive thrombus in L peroneal vein. Pt admitted for PNA of BL lungs d/t infectious disease.  Referred By: Dr. Gallegos  Past Medical History Relevant to Rehab: HLD, hypothyroidism, and Addisons disease on prednisone, gastritis  Family/Caregiver Present: No  Co-Treatment: OT  Co-Treatment Reason: To maximize pt safety with functional mobility and discharge planning  Prior to Session Communication: Bedside nurse  Patient Position Received: Bed, 3 rail up, Alarm on  Preferred Learning Style: verbal  General Comment: Pt pleasant and agreeable to work with therapy. Pt refusing alarms this date, RN notified and aware.    Home Living:  Home Living  Home Living Comments: Pt lives alone in a house  with 3 BELIA to enter with HR. 1/2 bath on first floor. 13 BELIA to basement laundry. 13 BELIA to bed/bath with HR. Tub shower, no DME. Pt independent with mobility, ADLs and IADLs. (+) drives, (-) falls    Prior Level of Function:  Prior Function Per Pt/Caregiver Report  Level of Mobile: Independent with ADLs and functional transfers, Independent with homemaking with ambulation  ADL Assistance: Independent  Homemaking Assistance: Independent  Ambulatory Assistance: Independent    Precautions:  Precautions  Medical Precautions: Fall precautions, Oxygen therapy device and L/min (2L O2 via NC)  Precautions Comment: Droplet/contact precautions (covid-19 rule out)    Vital Signs:  Vital Signs  Vitals Session: During PT  SpO2: 91 % (Pt initially on 2L O2 via NC, for mobility pt on RA and satting at 91%, oxygen placed back on pt post tx)    Objective     Pain:  Pain Assessment  Pain Assessment: 0-10  0-10 (Numeric) Pain Score:  (Pt did not numerically rank pain)  Pain Type: Chronic pain  Pain Location: Back  Pain Orientation: Lower  Pain Radiating Towards: Right  Pain Interventions: Repositioned    Cognition:  Cognition  Overall Cognitive Status: Within Functional Limits  Orientation Level: Oriented X4  Attention: Within Functional Limits    General Assessments:      Activity Tolerance  Endurance: Tolerates 10 - 20 min exercise with multiple rests    Sensation  Light Touch: No apparent deficits  Sensation Comment: Pt denies any n/t.     Static Sitting Balance  Static Sitting-Balance Support: Bilateral upper extremity supported, Feet supported  Static Sitting-Level of Assistance: Close supervision  Static Standing Balance  Static Standing-Balance Support: Bilateral upper extremity supported  Static Standing-Level of Assistance: Contact guard  Dynamic Standing Balance  Dynamic Standing-Balance Support: Bilateral upper extremity supported  Dynamic Standing-Level of Assistance: Contact guard    Functional Assessments:     Bed  Mobility  Bed Mobility: Yes  Bed Mobility 1  Bed Mobility 1: Supine to sitting, Scooting  Level of Assistance 1: Close supervision  Bed Mobility Comments 1: HOB elevated    Transfers  Transfer: Yes  Transfer 1  Transfer From 1: Bed to, Stand to  Transfer to 1: Stand, Bed  Technique 1: Sit to stand, Stand to sit  Transfer Level of Assistance 1: Contact guard    Ambulation/Gait Training  Ambulation/Gait Training Performed: Yes  Ambulation/Gait Training 1  Surface 1: Level tile  Device 1: No device  Assistance 1: Contact guard, Minimal verbal cues  Quality of Gait 1: Wide base of support, Diminished heel strike (decreased boaz, instability)  Comments/Distance (ft) 1: ~40 ft with reciprocal gait pattern; pt with significant coughing and demos instability; VCs for safety awareness     Extremity/Trunk Assessments:     RLE   RLE : Within Functional Limits  LLE   LLE : Within Functional Limits    Outcome Measures:     [facility] Basic Mobility  Turning from your back to your side while in a flat bed without using bedrails: A little  Moving from lying on your back to sitting on the side of a flat bed without using bedrails: A little  Moving to and from bed to chair (including a wheelchair): A little  Standing up from a chair using your arms (e.g. wheelchair or bedside chair): A little  To walk in hospital room: A little  Climbing 3-5 steps with railing: Total  Basic Mobility - Total Score: 16     Goals:  Encounter Problems       Encounter Problems (Active)       PT Problem       Pt will be able to complete all bed mobility tasks (rolling, sit <> sup) mod I.         Start:  07/18/25    Expected End:  08/01/25            Pt will be able to complete all transfers with LRD SBA demonstrating good safety awareness and proper body mechanics.        Start:  07/18/25    Expected End:  08/01/25            Pt will be able to ambulate >/= 50 ft with LRD SBA with good safety awareness and stability.        Start:  07/18/25    Expected End:   08/01/25            Pt will be able to negotiate ascending/descending 4 BELIA with use of unilateral HR SBA with sufficient foot clearance and good safety awareness for safe home going.         Start:  07/18/25    Expected End:  08/01/25            Pt will complete supine, seated and standing exercises to maintain/improve overall strength with minimal verbal cues.         Start:  07/18/25    Expected End:  08/01/25                 Education Documentation  Precautions, taught by Claudia Graves PT at 7/18/2025  1:08 PM.  Learner: Patient  Readiness: Acceptance  Method: Explanation  Response: Verbalizes Understanding, Demonstrated Understanding, Needs Reinforcement    Body Mechanics, taught by Claudia Graves PT at 7/18/2025  1:08 PM.  Learner: Patient  Readiness: Acceptance  Method: Explanation  Response: Verbalizes Understanding, Demonstrated Understanding, Needs Reinforcement    Home Exercise Program, taught by Claudia Graves PT at 7/18/2025  1:08 PM.  Learner: Patient  Readiness: Acceptance  Method: Explanation  Response: Verbalizes Understanding, Demonstrated Understanding, Needs Reinforcement    Mobility Training, taught by Claudia Graves PT at 7/18/2025  1:08 PM.  Learner: Patient  Readiness: Acceptance  Method: Explanation  Response: Verbalizes Understanding, Demonstrated Understanding, Needs Reinforcement    Education Comments  No comments found.           [1]   Patient Active Problem List  Diagnosis    Jama's disease (Multi)    Bowel habit changes    Cough    Elevated liver enzymes    History of changing skin mole    History of colon polyps    Hyperlipidemia    Post-op pain    Prediabetes    Preop examination    Prostate cancer screening    Renal insufficiency    Screening for abdominal aortic aneurysm (AAA) performed    Sleep difficulties    Subclinical hypothyroidism    Obesity (BMI 30.0-34.9)    Other synovitis and tenosynovitis, right hand    Healthcare maintenance    Calculus of  ureter    Gastritis    Hemangioma of skin and subcutaneous tissue    Other specified follicular disorders    Hyponatremia    Leukocytosis    Loss of appetite    Melanocytic nevi of trunk    Other melanin hyperpigmentation    Other seborrheic keratosis    Rosacea, unspecified    Right flank pain    Lumbar strain    Non-recurrent unilateral inguinal hernia without obstruction or gangrene    Epistaxis requiring cauterization    Other chest pain    BMI 33.0-33.9,adult    Cervical strain    LUIS FERNANDO (acute kidney injury)    Benign prostatic hyperplasia with urinary obstruction    Calculus of right kidney    Spinal stenosis of lumbar region without neurogenic claudication    Poor appetite    Community acquired pneumonia    Generalized weakness    Starvation ketoacidosis    Elevated troponin    Pneumonia of both lungs due to infectious organism, unspecified part of lung    Bilateral pulmonary embolism (Multi)    Acute hyponatremia    Acute deep vein thrombosis (DVT) of left peroneal vein (Multi)

## 2025-07-18 NOTE — CARE PLAN
Problem: Pain - Adult  Goal: Verbalizes/displays adequate comfort level or baseline comfort level  Outcome: Progressing     Problem: Safety - Adult  Goal: Free from fall injury  Outcome: Progressing     Problem: Discharge Planning  Goal: Discharge to home or other facility with appropriate resources  Outcome: Progressing     Problem: Chronic Conditions and Co-morbidities  Goal: Patient's chronic conditions and co-morbidity symptoms are monitored and maintained or improved  Outcome: Progressing     Problem: Fall/Injury  Goal: Not fall by end of shift  Outcome: Progressing     Problem: Fall/Injury  Goal: Be free from injury by end of the shift  Outcome: Progressing     Problem: Pain  Goal: Takes deep breaths with improved pain control throughout the shift  Outcome: Progressing     Problem: Pain  Goal: Turns in bed with improved pain control throughout the shift  Outcome: Progressing   The patient's goals for the shift include comfort/safety    The clinical goals for the shift include see POC

## 2025-07-19 VITALS
BODY MASS INDEX: 31.97 KG/M2 | DIASTOLIC BLOOD PRESSURE: 65 MMHG | OXYGEN SATURATION: 94 % | SYSTOLIC BLOOD PRESSURE: 120 MMHG | HEIGHT: 67 IN | HEART RATE: 64 BPM | WEIGHT: 203.71 LBS | RESPIRATION RATE: 16 BRPM | TEMPERATURE: 97 F

## 2025-07-19 PROBLEM — E87.1 ACUTE HYPONATREMIA: Status: RESOLVED | Noted: 2025-07-16 | Resolved: 2025-07-19

## 2025-07-19 PROBLEM — J18.9 PNEUMONIA OF BOTH LUNGS DUE TO INFECTIOUS ORGANISM, UNSPECIFIED PART OF LUNG: Status: RESOLVED | Noted: 2025-07-15 | Resolved: 2025-07-19

## 2025-07-19 LAB
ANION GAP SERPL CALC-SCNC: 11 MMOL/L (ref 10–20)
BUN SERPL-MCNC: 21 MG/DL (ref 6–23)
CALCIUM SERPL-MCNC: 8.4 MG/DL (ref 8.6–10.3)
CHLORIDE SERPL-SCNC: 101 MMOL/L (ref 98–107)
CO2 SERPL-SCNC: 23 MMOL/L (ref 21–32)
CREAT SERPL-MCNC: 0.86 MG/DL (ref 0.5–1.3)
EGFRCR SERPLBLD CKD-EPI 2021: >90 ML/MIN/1.73M*2
ERYTHROCYTE [DISTWIDTH] IN BLOOD BY AUTOMATED COUNT: 12.6 % (ref 11.5–14.5)
GLUCOSE BLD MANUAL STRIP-MCNC: 155 MG/DL (ref 74–99)
GLUCOSE BLD MANUAL STRIP-MCNC: 96 MG/DL (ref 74–99)
GLUCOSE SERPL-MCNC: 104 MG/DL (ref 74–99)
HCT VFR BLD AUTO: 33.9 % (ref 41–52)
HGB BLD-MCNC: 11.6 G/DL (ref 13.5–17.5)
MAGNESIUM SERPL-MCNC: 2.06 MG/DL (ref 1.6–2.4)
MCH RBC QN AUTO: 30.9 PG (ref 26–34)
MCHC RBC AUTO-ENTMCNC: 34.2 G/DL (ref 32–36)
MCV RBC AUTO: 90 FL (ref 80–100)
NRBC BLD-RTO: 0 /100 WBCS (ref 0–0)
PLATELET # BLD AUTO: 542 X10*3/UL (ref 150–450)
POTASSIUM SERPL-SCNC: 4 MMOL/L (ref 3.5–5.3)
RBC # BLD AUTO: 3.75 X10*6/UL (ref 4.5–5.9)
SODIUM SERPL-SCNC: 131 MMOL/L (ref 136–145)
WBC # BLD AUTO: 12.5 X10*3/UL (ref 4.4–11.3)

## 2025-07-19 PROCEDURE — 36415 COLL VENOUS BLD VENIPUNCTURE: CPT

## 2025-07-19 PROCEDURE — 2500000001 HC RX 250 WO HCPCS SELF ADMINISTERED DRUGS (ALT 637 FOR MEDICARE OP): Performed by: STUDENT IN AN ORGANIZED HEALTH CARE EDUCATION/TRAINING PROGRAM

## 2025-07-19 PROCEDURE — 83735 ASSAY OF MAGNESIUM: CPT

## 2025-07-19 PROCEDURE — 82947 ASSAY GLUCOSE BLOOD QUANT: CPT

## 2025-07-19 PROCEDURE — 80048 BASIC METABOLIC PNL TOTAL CA: CPT

## 2025-07-19 PROCEDURE — 94640 AIRWAY INHALATION TREATMENT: CPT

## 2025-07-19 PROCEDURE — 99239 HOSP IP/OBS DSCHRG MGMT >30: CPT

## 2025-07-19 PROCEDURE — 2500000002 HC RX 250 W HCPCS SELF ADMINISTERED DRUGS (ALT 637 FOR MEDICARE OP, ALT 636 FOR OP/ED)

## 2025-07-19 PROCEDURE — 2500000002 HC RX 250 W HCPCS SELF ADMINISTERED DRUGS (ALT 637 FOR MEDICARE OP, ALT 636 FOR OP/ED): Performed by: STUDENT IN AN ORGANIZED HEALTH CARE EDUCATION/TRAINING PROGRAM

## 2025-07-19 PROCEDURE — 85027 COMPLETE CBC AUTOMATED: CPT

## 2025-07-19 RX ORDER — SODIUM CHLORIDE 1000 MG
1000 TABLET, SOLUBLE MISCELLANEOUS
Qty: 90 TABLET | Refills: 0 | Status: SHIPPED | OUTPATIENT
Start: 2025-07-19 | End: 2025-08-18

## 2025-07-19 RX ORDER — HYDROCORTISONE 5 MG/1
TABLET ORAL
Qty: 20 TABLET | Refills: 0 | Status: SHIPPED | OUTPATIENT
Start: 2025-07-19 | End: 2025-07-22

## 2025-07-19 RX ORDER — HYDROCODONE BITARTRATE AND HOMATROPINE METHYLBROMIDE ORAL SOLUTION 5; 1.5 MG/5ML; MG/5ML
5 LIQUID ORAL EVERY 6 HOURS PRN
Qty: 100 ML | Refills: 0 | Status: SHIPPED | OUTPATIENT
Start: 2025-07-19

## 2025-07-19 RX ADMIN — SODIUM CHLORIDE 1 G: 1 TABLET ORAL at 12:15

## 2025-07-19 RX ADMIN — HYDROCORTISONE 25 MG: 5 TABLET ORAL at 08:54

## 2025-07-19 RX ADMIN — IPRATROPIUM BROMIDE AND ALBUTEROL SULFATE 3 ML: 2.5; .5 SOLUTION RESPIRATORY (INHALATION) at 08:48

## 2025-07-19 RX ADMIN — BUDESONIDE 0.25 MG: 0.25 INHALANT RESPIRATORY (INHALATION) at 08:48

## 2025-07-19 RX ADMIN — FLUTICASONE PROPIONATE 1 SPRAY: 50 SPRAY, METERED NASAL at 08:56

## 2025-07-19 RX ADMIN — INSULIN LISPRO 1 UNITS: 100 INJECTION, SOLUTION INTRAVENOUS; SUBCUTANEOUS at 12:15

## 2025-07-19 RX ADMIN — APIXABAN 10 MG: 5 TABLET, FILM COATED ORAL at 08:55

## 2025-07-19 RX ADMIN — CARBAMIDE PEROXIDE 5 DROP: 65 SOLUTION/ DROPS TOPICAL at 08:56

## 2025-07-19 RX ADMIN — SODIUM CHLORIDE 1 G: 1 TABLET ORAL at 08:55

## 2025-07-19 RX ADMIN — LEVOTHYROXINE SODIUM 50 MCG: 0.05 TABLET ORAL at 06:05

## 2025-07-19 RX ADMIN — IPRATROPIUM BROMIDE AND ALBUTEROL SULFATE 3 ML: 2.5; .5 SOLUTION RESPIRATORY (INHALATION) at 13:02

## 2025-07-19 RX ADMIN — ATORVASTATIN CALCIUM 40 MG: 40 TABLET, FILM COATED ORAL at 08:55

## 2025-07-19 ASSESSMENT — PAIN SCALES - GENERAL
PAINLEVEL_OUTOF10: 0 - NO PAIN
PAINLEVEL_OUTOF10: 0 - NO PAIN

## 2025-07-19 ASSESSMENT — COGNITIVE AND FUNCTIONAL STATUS - GENERAL
WALKING IN HOSPITAL ROOM: A LITTLE
MOVING TO AND FROM BED TO CHAIR: A LITTLE
MOBILITY SCORE: 19
STANDING UP FROM CHAIR USING ARMS: A LITTLE
CLIMB 3 TO 5 STEPS WITH RAILING: A LITTLE
TOILETING: A LITTLE
DRESSING REGULAR LOWER BODY CLOTHING: A LITTLE
DAILY ACTIVITIY SCORE: 22
TURNING FROM BACK TO SIDE WHILE IN FLAT BAD: A LITTLE

## 2025-07-19 NOTE — DISCHARGE SUMMARY
Discharge Diagnosis  hypoxia  recent treatment for CAP  Provoked PE/DVT  Acute occlusive thrombus in L peroneal vein    Issues Requiring Follow-Up  PCP follow up  Eliquis for DVT/PE for at least 3 months, likely provoked  Solu-Cortef taper to home prednisone 5mg daily  Salt tabs, fluid restriction for hyponatremia    Discharge Meds     Medication List      PAUSE taking these medications     predniSONE 5 mg tablet; Wait to take this until: July 22, 2025; Commonly   known as: Deltasone; Take 1 tablet (5 mg) by mouth once daily.     START taking these medications     apixaban 5 mg tablet; Commonly known as: Eliquis; Take 2 tablets (10 mg)   by mouth 2 times a day for 4 days, THEN 1 tablet (5 mg) 2 times a day.;   Start taking on: July 19, 2025   carbamide peroxide 6.5 % otic solution; Commonly known as: Debrox;   Administer 5 drops into the left ear 2 times a day.   hydrocodone-homatropine 5-1.5 mg/5 mL syrup; Commonly known as: Hycodan;   Take 5 mL by mouth every 6 hours if needed for cough.   hydrocortisone 5 mg tablet; Commonly known as: Cortef; Take 5 tablets   (25 mg) by mouth every 12 hours for 1 day, THEN 5 tablets (25 mg) once   daily for 2 days.; Start taking on: July 19, 2025   sodium chloride 1,000 mg tablet; Take 1 tablet (1 g) by mouth 3 times   daily (morning, midday, late afternoon).     CONTINUE taking these medications     albuterol 90 mcg/actuation inhaler; Commonly known as: Ventolin HFA;   Inhale 2 puffs every 4 hours if needed for wheezing or shortness of   breath.   atorvastatin 40 mg tablet; Commonly known as: Lipitor; TAKE 1 TABLET BY   MOUTH EVERY DAY   benzonatate 100 mg capsule; Commonly known as: Tessalon; Take 1 capsule   (100 mg) by mouth 3 times a day as needed for cough (2nd line). Do not   crush or chew.   fluticasone 50 mcg/actuation nasal spray; Commonly known as: Flonase;   Administer 1 spray into each nostril once daily. Shake gently. Before   first use, prime pump. After use, clean  tip and replace cap.   levothyroxine 50 mcg tablet; Commonly known as: Synthroid, Levoxyl; Take   1 tablet (50 mcg) by mouth once daily in the morning. Take before meals.     STOP taking these medications     azithromycin 500 mg tablet; Commonly known as: Zithromax   cefdinir 300 mg capsule; Commonly known as: Omnicef   codeine-guaifenesin  mg/5 mL syrup; Commonly known as:   Robitussin-AC       Test Results Pending At Discharge  Pending Labs       Order Current Status    Blood Culture Preliminary result    Blood Culture Preliminary result            Hospital Course  Mr. 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  Presented with shortness of breath, right-sided pleuritic chest pain and dry cough.    CT PE with segmental emboli in bilateral lower lobes, right middle lobe and left lower lobe. suspect PE provoked in the setting of recent pneumonia/hospitalization, Radiographic findings of early right heart strain. CT also demonstrates bibasilar atelectasis and trace bilateral pleural effusion. PESI score 80, indicating class II, low risk PE. Troponin and BNP low. Given RHS seen on CT, TTE ordered which demonstrated normal EF and RVSP. DVT US with acute occlusive thrombus in L peroneal vein.  Originally treated with heparin drip and transitioned to apixaban 7/16 He will need AC for at least 3 months for first provoked VTE.  Discussed this extensively at the bedside.  He does have a fear of blood thinners given his family history of father passing from was felt to be saddle PE, likely provoked in setting of COPD/bedbound.  Discussed that he could follow-up with his primary care in regards to discontinuing Eliquis and consideration for repeat imaging to ensure a clot resolving given size.  Patient originally on nasal cannula and  transition to room air stable for discharge and ambulatory pulse ox negative for need for oxygen.  Antibiotics were discontinued as low suspicion for recurrent pneumonia given low Pro-Abner.    Given acute illness in setting of Jaam's disease was started on stress dose steroids with hydrocortisone and tapered and was discharged on continued taper.  To follow-up with PCP within 1 week for hospital follow-up.  On admission labs were remarkable for hyponatremia of 132 however down trended to 124.  Nephrology was consulted who felt hyponatremia due to Hinsdale's disease versus SIADH in setting of elevated sodium of 55 and urine osmole's of 759.  Patient was fluid restricted to 1200 cc and started on salt tabs.  This fluid restriction and salt tablets were prescribed on discharge and patient endorsed compliance with this regimen until follow-up for repeat labs.    Discharged home in medically optimized condition.    Notified after hospital discharge that patient's insurance company has refused to fill Eliquis starter pack and are requesting only 2 pills daily (5mg twice daily). This is not the standard of care given he requires treatment for DVT/PE with 10mg BID for additional 4 days. Unfortunately the patient's pharmacy does not have additional starter packs available. Thus I have called [facility] on [address] [facility] at [phone] for 10mg bid for 4 days and 5mg bid for 3 mos.    I have called the patient's cell phone and talked to him about the new plan to  Eliquis. He voiced understanding and is en route to both pharmacies to  all meds.    Pertinent Physical Exam At Time of Discharge  Physical Exam  GENERAL: Alert and oriented x 3. Well-nourished.  EYES: Anicteric.  HENT: Moist mucous membranes. No scleral icterus. L tympanic membrane pearly gray without erythema or effusion.  LUNGS: CTA BL. No accessory muscle use.  CV: RRR. No murmur. No JVD.  ABDOMEN: Soft, non-tender and non-distended. No palpable  masses.   EXTREMITIES: No edema. Non-tender.?  SKIN: No rashes or lesions. Warm.  NEUROLOGIC: No focal neurological deficits.  PSYCHIATRIC: Cooperative. Appropriate mood and affect.    Outpatient Follow-Up  Future Appointments   Date Time Provider Department Center   9/2/2025  1:00 PM [facility]     45 minutes were spent in discharge planning with >50% of that time spent in direct patient counseling and coordination of care.      Reta Gallegos DO

## 2025-07-19 NOTE — DISCHARGE INSTRUCTIONS
You were treated for blood clots in your lungs and low sodium in your bloodstream.    Please follow up with your primary care provider within 7 days for hospital follow up. Please call to make this appointment.     Please limit your water intake to 1200 mL of water and other drinks daily.  This does not include protein supplements    Med changes:  Start Eliquis 10 mg tablets twice a day for the next 4 days then begin 5 mg tablets twice a day for the next 3 months until follow-up with your primary care determine if can discontinue safely  Utilize Debrox solution as needed for ear fullness  Utilize hycodan cough syrup 5mLevery 6 hours as needed for cough  Start 1000 mg tablets 3 times a day.  This is to prevent low sodium levels in your bloodstream.  Start hydrocortisone 25mg tablet Tonight then 25mg daily x 2 days then transition back to prednisone 5mg daily    Please take your medications as prescribed.     If you have any new or worsening symptoms seek medical attention.    Thank you for allowing us to participate in your care!    -[facility].    You may receive a survey in the mail or electronically. We would greatly appreciate it if you would take the survey and provide honest feedback. We review every survey result that comes in, and use this information to enhance the care of our community. Thank you in advance, we appreciate your input.

## 2025-07-19 NOTE — CARE PLAN
The patient's goals for the shift include comfort/safety    The clinical goals for the shift include see plan of care      Problem: Pain - Adult  Goal: Verbalizes/displays adequate comfort level or baseline comfort level  Outcome: Progressing     Problem: Safety - Adult  Goal: Free from fall injury  Outcome: Progressing     Problem: Discharge Planning  Goal: Discharge to home or other facility with appropriate resources  Outcome: Progressing     Problem: Chronic Conditions and Co-morbidities  Goal: Patient's chronic conditions and co-morbidity symptoms are monitored and maintained or improved  Outcome: Progressing     Problem: Nutrition  Goal: Nutrient intake appropriate for maintaining nutritional needs  Outcome: Progressing     Problem: Fall/Injury  Goal: Not fall by end of shift  Outcome: Progressing  Goal: Be free from injury by end of the shift  Outcome: Progressing  Goal: Verbalize understanding of personal risk factors for fall in the hospital  Outcome: Progressing  Goal: Verbalize understanding of risk factor reduction measures to prevent injury from fall in the home  Outcome: Progressing  Goal: Use assistive devices by end of the shift  Outcome: Progressing  Goal: Pace activities to prevent fatigue by end of the shift  Outcome: Progressing     Problem: Pain  Goal: Takes deep breaths with improved pain control throughout the shift  Outcome: Progressing  Goal: Turns in bed with improved pain control throughout the shift  Outcome: Progressing  Goal: Walks with improved pain control throughout the shift  Outcome: Progressing     Problem: Skin  Goal: Decreased wound size/increased tissue granulation at next dressing change  Outcome: Progressing  Goal: Participates in plan/prevention/treatment measures  Outcome: Progressing  Goal: Prevent/manage excess moisture  Outcome: Progressing  Goal: Prevent/minimize sheer/friction injuries  Outcome: Progressing  Goal: Promote/optimize nutrition  Outcome: Progressing  Goal:  Promote skin healing  Outcome: Progressing

## 2025-07-19 NOTE — NURSING NOTE
0700: Assumed care of pt     0855: Pt is lying in bed with HOB elevated. Facial symmetry is even and speech is clear. Pt answers questions appropriately and follows simple commands. Skin is warm, dry and intact. Color is WNL for ethnicity. Respirations are even and un labored - lung sounds a bilaterally diminished with some expiratory wheezing. All AM medications taken as ordered. Call light and fluids within reach.     1512: Discharge education provided to pt and brother at this time. Pt denies any further questions at this time. IV's and tele removed.     1521: Pt transported via wheelchair to private vehicle at this time.

## 2025-07-19 NOTE — CARE PLAN
The patient's goals for the shift include sleep    The clinical goals for the shift include see POC      Problem: Pain - Adult  Goal: Verbalizes/displays adequate comfort level or baseline comfort level  Outcome: Progressing     Problem: Safety - Adult  Goal: Free from fall injury  Outcome: Progressing     Problem: Discharge Planning  Goal: Discharge to home or other facility with appropriate resources  Outcome: Progressing     Problem: Chronic Conditions and Co-morbidities  Goal: Patient's chronic conditions and co-morbidity symptoms are monitored and maintained or improved  Outcome: Progressing     Problem: Nutrition  Goal: Nutrient intake appropriate for maintaining nutritional needs  Outcome: Progressing     Problem: Fall/Injury  Goal: Not fall by end of shift  Outcome: Progressing  Goal: Be free from injury by end of the shift  Outcome: Progressing  Goal: Verbalize understanding of personal risk factors for fall in the hospital  Outcome: Progressing  Goal: Verbalize understanding of risk factor reduction measures to prevent injury from fall in the home  Outcome: Progressing  Goal: Use assistive devices by end of the shift  Outcome: Progressing  Goal: Pace activities to prevent fatigue by end of the shift  Outcome: Progressing     Problem: Pain  Goal: Takes deep breaths with improved pain control throughout the shift  Outcome: Progressing  Goal: Turns in bed with improved pain control throughout the shift  Outcome: Progressing  Goal: Walks with improved pain control throughout the shift  Outcome: Progressing     Problem: Skin  Goal: Decreased wound size/increased tissue granulation at next dressing change  Outcome: Progressing  Flowsheets (Taken 7/19/2025 0044)  Decreased wound size/increased tissue granulation at next dressing change: Promote sleep for wound healing  Goal: Participates in plan/prevention/treatment measures  Outcome: Progressing  Flowsheets (Taken 7/19/2025 0044)  Participates in  plan/prevention/treatment measures: Elevate heels  Goal: Prevent/manage excess moisture  Outcome: Progressing  Flowsheets (Taken 7/19/2025 0044)  Prevent/manage excess moisture: Monitor for/manage infection if present  Goal: Prevent/minimize sheer/friction injuries  Outcome: Progressing  Flowsheets (Taken 7/19/2025 0044)  Prevent/minimize sheer/friction injuries: Use pull sheet  Goal: Promote/optimize nutrition  Outcome: Progressing  Flowsheets (Taken 7/19/2025 0044)  Promote/optimize nutrition: Monitor/record intake including meals  Goal: Promote skin healing  Outcome: Progressing  Flowsheets (Taken 7/19/2025 0044)  Promote skin healing: Assess skin/pad under line(s)/device(s)

## 2025-07-20 LAB
BACTERIA BLD CULT: NORMAL
BACTERIA BLD CULT: NORMAL

## 2025-07-21 ENCOUNTER — PATIENT OUTREACH (OUTPATIENT)
Dept: PRIMARY CARE | Facility: CLINIC | Age: 70
End: 2025-07-21
Payer: MEDICARE

## 2025-07-21 NOTE — PROGRESS NOTES
Discharge Facility: [facility]  Discharge Diagnosis:  - hypoxia  - recent treatment for CAP  - Provoked PE/DVT  - Acute occlusive thrombus in L peroneal vein  Admission Date: 7/15/25  Discharge Date: 7/19/25    PCP Appointment Date: 7/23/25  Specialist Appointment Date:  N/A  Hospital Encounter and Summary Linked: Yes  ED to Hosp-Admission (Discharged) with Reta Gallegos DO; Yakov Espino DO; Diaz Lion DO (07/15/2025)   See discharge assessment below for further details: N/A    Two attempts were made to reach patient within two business days after discharge. Left voicemail with contact information for patient to call back with any non-emergent questions or concerns.

## 2025-07-23 ENCOUNTER — OFFICE VISIT (OUTPATIENT)
Dept: PRIMARY CARE | Facility: CLINIC | Age: 70
End: 2025-07-23
Payer: MEDICARE

## 2025-07-23 VITALS
HEART RATE: 72 BPM | RESPIRATION RATE: 20 BRPM | SYSTOLIC BLOOD PRESSURE: 112 MMHG | HEIGHT: 67 IN | BODY MASS INDEX: 32.18 KG/M2 | WEIGHT: 205 LBS | OXYGEN SATURATION: 95 % | TEMPERATURE: 97.2 F | DIASTOLIC BLOOD PRESSURE: 68 MMHG

## 2025-07-23 DIAGNOSIS — J18.9 COMMUNITY ACQUIRED PNEUMONIA, UNSPECIFIED LATERALITY: Primary | ICD-10-CM

## 2025-07-23 DIAGNOSIS — I82.4Z2 ACUTE DEEP VEIN THROMBOSIS (DVT) OF DISTAL VEIN OF LEFT LOWER EXTREMITY: ICD-10-CM

## 2025-07-23 DIAGNOSIS — E87.1 HYPONATREMIA: ICD-10-CM

## 2025-07-23 PROBLEM — I82.409 DVT (DEEP VENOUS THROMBOSIS) (MULTI): Status: ACTIVE | Noted: 2025-07-23

## 2025-07-23 PROCEDURE — 1111F DSCHRG MED/CURRENT MED MERGE: CPT | Performed by: FAMILY MEDICINE

## 2025-07-23 PROCEDURE — 99496 TRANSJ CARE MGMT HIGH F2F 7D: CPT | Performed by: FAMILY MEDICINE

## 2025-07-23 PROCEDURE — 3008F BODY MASS INDEX DOCD: CPT | Performed by: FAMILY MEDICINE

## 2025-07-23 PROCEDURE — 1159F MED LIST DOCD IN RCRD: CPT | Performed by: FAMILY MEDICINE

## 2025-07-23 NOTE — ASSESSMENT & PLAN NOTE
Patient has had multiple ER visits and hospitalization recently due to medical condition.  He was first seen at the emergency room on 7/2/2025.  At that time he stated that for about 3 and half days prior to the visit he was feeling sick under much of nonconsumer with fever.  He had a lot of sinus pressure congestion generalized sore throat painful to swallow.  Evaluation in the emergency department was remarkable for erythema of the pharynx with exam showing elevated white count of 22.  He had a left shift and urine testing was unremarkable with group A strep not detected and flu testing and COVID test all being negative.  Patient was worked up with EKG and given 2 L of IV fluids.  There was no clear source for infection at evaluation and this was over the July 4 week on.  Cardiac workup was ordered and there was a low suspicion for sepsis.  He was given close instruction to follow-up but was discharged and subsequently returned to the emergency room 6 days later.  At that time he stated he was unable to eat still with bilateral ear pressure and discomfort related to the records he had eaten a salad only in 5 days prior to evaluation evaluation in the emergency department was remarkable for middle ear effusion with erythematous and bulging tympanic membrane on the right ear and abdominal tenderness right lower quadrant.  Labs were ordered with WBC still elevated at 12.5 and urinalysis remarkable for dehydration with specific gravity greater than 1.050.  Beta hydroxybutyrate was elevated at 1.60 procalcitonin testing was performed.  Chest x-ray revealed a small nodular right suprahilar infiltrate consistent with pneumonia CT of the abdomen pelvis showed partially visualized right lower lobe consolidation with opacities concerning for aspiration and/or pneumonia.  Moderate size right inguinal hernia was appreciated.  In light of this recommendation for admission was made and patient was given an additional 20 mg of  prednisone given history of Wells's disease along with low sodium in the setting of acute illness.Patient after this hospitalization was diagnosed with pneumonia and advised chest x-ray in 4 weeks to make sure that there was complete resolution of pneumonia.  He was discharged on azithromycin 1 tablet daily x 3 doses and Robitussin AC and albuterol.  He was advised to continue his atorvastatin fluticasone thyroid and prednisone pills.    In summary he had elevated procalcitonin his urine strep and Legionella test were pending... Order chest x-ray and CBC with differential to pulmonary med to be sure complete resolution of infiltrates

## 2025-07-23 NOTE — ASSESSMENT & PLAN NOTE
Continue Eliquis x 3 months prescription given discussed with patient and advised to be certain not underlying clotting state will have hematology review and also for length of Eliquis

## 2025-07-23 NOTE — ASSESSMENT & PLAN NOTE
During hospitalization patient was seen by nephrology urine sodium and urine potassium ordered serum sodium was monitored diagnoses patient had euvolemic hyponatremia.  None of his meds were felt to be causing his hyponatremia consider longer course of stress dose steroids due to his recent health problems his urine osmolality is 759 continue salt tablets.  Referral to nephrology for electrolyte management

## 2025-07-25 ENCOUNTER — PATIENT OUTREACH (OUTPATIENT)
Dept: PRIMARY CARE | Facility: CLINIC | Age: 70
End: 2025-07-25
Payer: MEDICARE

## 2025-07-25 NOTE — PROGRESS NOTES
Confirmation of at least 2 patient identifiers.    Completed telephonic follow-up with patient after recent visit with PCP    Spoke to patient during outreach call.    Patient reports feeling: Improved    Patient has questions or concerns about medications: No    Have all prescribed medications been filled? Yes    Patient has necessary resources to manage their care? Yes    Patient has questions or concerns? No    Next care management follow-up approximately within one month.  Care  information provided to patient.

## 2025-07-31 ENCOUNTER — HOSPITAL ENCOUNTER (OUTPATIENT)
Dept: RADIOLOGY | Facility: CLINIC | Age: 70
Discharge: HOME | End: 2025-07-31
Payer: MEDICARE

## 2025-07-31 DIAGNOSIS — J18.9 COMMUNITY ACQUIRED PNEUMONIA, UNSPECIFIED LATERALITY: ICD-10-CM

## 2025-07-31 PROCEDURE — 71046 X-RAY EXAM CHEST 2 VIEWS: CPT

## 2025-07-31 PROCEDURE — 71046 X-RAY EXAM CHEST 2 VIEWS: CPT | Performed by: RADIOLOGY

## 2025-08-01 LAB
ALBUMIN SERPL-MCNC: 3.9 G/DL (ref 3.6–5.1)
ALP SERPL-CCNC: 81 U/L (ref 35–144)
ALT SERPL-CCNC: 25 U/L (ref 9–46)
ANION GAP SERPL CALCULATED.4IONS-SCNC: 9 MMOL/L (CALC) (ref 7–17)
AST SERPL-CCNC: 20 U/L (ref 10–35)
BASOPHILS # BLD AUTO: 43 CELLS/UL (ref 0–200)
BASOPHILS NFR BLD AUTO: 0.5 %
BILIRUB SERPL-MCNC: 0.7 MG/DL (ref 0.2–1.2)
BUN SERPL-MCNC: 22 MG/DL (ref 7–25)
CALCIUM SERPL-MCNC: 9.1 MG/DL (ref 8.6–10.3)
CHLORIDE SERPL-SCNC: 101 MMOL/L (ref 98–110)
CO2 SERPL-SCNC: 22 MMOL/L (ref 20–32)
CREAT SERPL-MCNC: 1.19 MG/DL (ref 0.7–1.28)
EGFRCR SERPLBLD CKD-EPI 2021: 66 ML/MIN/1.73M2
EOSINOPHIL # BLD AUTO: 170 CELLS/UL (ref 15–500)
EOSINOPHIL NFR BLD AUTO: 2 %
ERYTHROCYTE [DISTWIDTH] IN BLOOD BY AUTOMATED COUNT: 12.8 % (ref 11–15)
GLUCOSE SERPL-MCNC: 87 MG/DL (ref 65–99)
HCT VFR BLD AUTO: 44.5 % (ref 38.5–50)
HGB BLD-MCNC: 15.1 G/DL (ref 13.2–17.1)
LYMPHOCYTES # BLD AUTO: 3400 CELLS/UL (ref 850–3900)
LYMPHOCYTES NFR BLD AUTO: 40 %
MCH RBC QN AUTO: 31.4 PG (ref 27–33)
MCHC RBC AUTO-ENTMCNC: 33.9 G/DL (ref 32–36)
MCV RBC AUTO: 92.5 FL (ref 80–100)
MONOCYTES # BLD AUTO: 604 CELLS/UL (ref 200–950)
MONOCYTES NFR BLD AUTO: 7.1 %
NEUTROPHILS # BLD AUTO: 4284 CELLS/UL (ref 1500–7800)
NEUTROPHILS NFR BLD AUTO: 50.4 %
PLATELET # BLD AUTO: 290 THOUSAND/UL (ref 140–400)
PMV BLD REES-ECKER: 10.4 FL (ref 7.5–12.5)
POTASSIUM SERPL-SCNC: 4.7 MMOL/L (ref 3.5–5.3)
PROT SERPL-MCNC: 8.1 G/DL (ref 6.1–8.1)
RBC # BLD AUTO: 4.81 MILLION/UL (ref 4.2–5.8)
SODIUM SERPL-SCNC: 132 MMOL/L (ref 135–146)
WBC # BLD AUTO: 8.5 THOUSAND/UL (ref 3.8–10.8)

## 2025-08-20 ENCOUNTER — APPOINTMENT (OUTPATIENT)
Dept: PRIMARY CARE | Facility: CLINIC | Age: 70
End: 2025-08-20
Payer: MEDICARE

## 2025-08-20 VITALS
WEIGHT: 210 LBS | DIASTOLIC BLOOD PRESSURE: 60 MMHG | HEIGHT: 67 IN | RESPIRATION RATE: 18 BRPM | BODY MASS INDEX: 32.96 KG/M2 | TEMPERATURE: 97 F | OXYGEN SATURATION: 97 % | SYSTOLIC BLOOD PRESSURE: 108 MMHG | HEART RATE: 61 BPM

## 2025-08-20 DIAGNOSIS — E27.1 ADDISON'S DISEASE (MULTI): ICD-10-CM

## 2025-08-20 DIAGNOSIS — J18.9 COMMUNITY ACQUIRED PNEUMONIA, UNSPECIFIED LATERALITY: Primary | ICD-10-CM

## 2025-08-20 PROCEDURE — 99213 OFFICE O/P EST LOW 20 MIN: CPT | Performed by: FAMILY MEDICINE

## 2025-08-20 PROCEDURE — 1159F MED LIST DOCD IN RCRD: CPT | Performed by: FAMILY MEDICINE

## 2025-08-20 PROCEDURE — 3008F BODY MASS INDEX DOCD: CPT | Performed by: FAMILY MEDICINE

## 2025-08-20 RX ORDER — PREDNISONE 5 MG/1
5 TABLET ORAL DAILY
Qty: 90 TABLET | Refills: 3 | Status: SHIPPED | OUTPATIENT
Start: 2025-08-20

## 2025-08-20 RX ORDER — PREDNISONE 5 MG/1
5 TABLET ORAL DAILY
Qty: 90 TABLET | Refills: 3 | Status: SHIPPED | OUTPATIENT
Start: 2025-08-20 | End: 2025-08-20

## 2025-09-02 ENCOUNTER — APPOINTMENT (OUTPATIENT)
Dept: PRIMARY CARE | Facility: CLINIC | Age: 70
End: 2025-09-02
Payer: MEDICARE

## 2025-09-05 LAB
ATRIAL RATE: 70 BPM
P AXIS: 73 DEGREES
P OFFSET: 186 MS
P ONSET: 130 MS
PR INTERVAL: 164 MS
Q ONSET: 212 MS
QRS COUNT: 12 BEATS
QRS DURATION: 88 MS
QT INTERVAL: 386 MS
QTC CALCULATION(BAZETT): 416 MS
QTC FREDERICIA: 406 MS
R AXIS: 57 DEGREES
T AXIS: 52 DEGREES
T OFFSET: 405 MS
VENTRICULAR RATE: 70 BPM

## 2025-11-05 ENCOUNTER — APPOINTMENT (OUTPATIENT)
Dept: PRIMARY CARE | Facility: CLINIC | Age: 70
End: 2025-11-05
Payer: MEDICARE